# Patient Record
Sex: FEMALE | Race: WHITE | NOT HISPANIC OR LATINO | Employment: OTHER | URBAN - METROPOLITAN AREA
[De-identification: names, ages, dates, MRNs, and addresses within clinical notes are randomized per-mention and may not be internally consistent; named-entity substitution may affect disease eponyms.]

---

## 2017-02-09 ENCOUNTER — HOSPITAL ENCOUNTER (INPATIENT)
Facility: MEDICAL CENTER | Age: 72
LOS: 3 days | DRG: 091 | End: 2017-02-13
Attending: EMERGENCY MEDICINE | Admitting: HOSPITALIST
Payer: MEDICARE

## 2017-02-09 DIAGNOSIS — N17.9 AKI (ACUTE KIDNEY INJURY) (HCC): ICD-10-CM

## 2017-02-09 DIAGNOSIS — F22 DELUSIONS OF PARASITOSIS (HCC): ICD-10-CM

## 2017-02-09 DIAGNOSIS — G93.40 ACUTE ENCEPHALOPATHY: ICD-10-CM

## 2017-02-09 DIAGNOSIS — F11.29 OPIOID DEPENDENCE WITH OPIOID-INDUCED DISORDER (HCC): ICD-10-CM

## 2017-02-09 DIAGNOSIS — E43 SEVERE PROTEIN-CALORIE MALNUTRITION (HCC): ICD-10-CM

## 2017-02-09 DIAGNOSIS — T40.601A OVERDOSE OPIATE, ACCIDENTAL OR UNINTENTIONAL, INITIAL ENCOUNTER (HCC): ICD-10-CM

## 2017-02-09 PROCEDURE — 93005 ELECTROCARDIOGRAM TRACING: CPT

## 2017-02-09 PROCEDURE — G0480 DRUG TEST DEF 1-7 CLASSES: HCPCS

## 2017-02-09 PROCEDURE — 304562 HCHG STAT O2 MASK/CANNULA

## 2017-02-09 PROCEDURE — 99291 CRITICAL CARE FIRST HOUR: CPT

## 2017-02-09 NOTE — IP AVS SNAPSHOT
2/13/2017          Dian Mendoza  [address] [facility] [address]  [facility] [address]    Dear Dian:    [facility] wants to ensure your discharge home is safe and you or your loved ones have had all your questions answered regarding your care after you leave the hospital.    You may receive a telephone call within two days of your discharge.  This call is to make certain you understand your discharge instructions as well as ensure we provided you with the best care possible during your stay with us.     The call will only last approximately 3-5 minutes and will be done by a nurse.    Once again, we want to ensure your discharge home is safe and that you have a clear understanding of any next steps in your care.  If you have any questions or concerns, please do not hesitate to contact us, we are here for you.  Thank you for choosing [facility] for your healthcare needs.    Sincerely,    Mark Amaya    [facility]

## 2017-02-09 NOTE — IP AVS SNAPSHOT
" Home Care Instructions                                                                                                                  Name:Dian Mendoza  Medical Record Number:[id number]  CSN: [id number]    YOB: 1945   Age: 71 y.o.  Sex: female  HT:1.753 m (5' 9.02\") WT: 62.8 kg (138 lb 7.2 oz)          Admit Date: 2/9/2017     Discharge Date:   Today's Date: 2/13/2017  Attending Doctor:  PATSY Meza*                  Allergies:  Atorvastatin; Ezetimibe; Gabapentin; Metoprolol; Other environmental; Spironolactone; Statins; and Tape            Discharge Instructions       Discharge Instructions    Discharged to home by taxi with self. Discharged via wheelchair, hospital escort: Yes.  Special equipment needed: Not Applicable    Be sure to schedule a follow-up appointment with your primary care doctor or any specialists as instructed.     Discharge Plan:   Diet Plan: Discussed (Regular diet as tolerated)  Activity Level: Discussed (Activity as tolerated)  Confirmed Follow up Appointment: Patient to Call and Schedule Appointment  Confirmed Symptoms Management: Discussed  Medication Reconciliation Updated: Yes  Influenza Vaccine Indication: Not indicated: Previously immunized this influenza season and > 8 years of age    I understand that a diet low in cholesterol, fat, and sodium is recommended for good health. Unless I have been given specific instructions below for another diet, I accept this instruction as my diet prescription.   Other diet: Regular diet as tolerated    Special Instructions: None    · Is patient discharged on Warfarin / Coumadin?   Yes    You are receiving the drug warfarin. Please understand the importance of monitoring warfarin with scheduled PT/INR blood draws.  Follow-up with a call to your personal Doctor's office in 3 days to schedule a PT/INR. .    IMPORTANT: HOW TO USE THIS INFORMATION:  This is a summary and does NOT have all possible information about this product. " "This information does not assure that this product is safe, effective, or appropriate for you. This information is not individual medical advice and does not substitute for the advice of your health care professional. Always ask your health care professional for complete information about this product and your specific health needs.      WARFARIN - ORAL (WARF-uh-rin)      COMMON BRAND NAME(S): Coumadin      WARNING:  Warfarin can cause very serious (possibly fatal) bleeding. This is more likely to occur when you first start taking this medication or if you take too much warfarin. To decrease your risk for bleeding, your doctor or other health care provider will monitor you closely and check your lab results (INR test) to make sure you are not taking too much warfarin. Keep all medical and laboratory appointments. Tell your doctor right away if you notice any signs of serious bleeding. See also Side Effects section.      USES:  This medication is used to treat blood clots (such as in deep vein thrombosis-DVT or pulmonary embolus-PE) and/or to prevent new clots from forming in your body. Preventing harmful blood clots helps to reduce the risk of a stroke or heart attack. Conditions that increase your risk of developing blood clots include a certain type of irregular heart rhythm (atrial fibrillation), heart valve replacement, recent heart attack, and certain surgeries (such as hip/knee replacement). Warfarin is commonly called a \"blood thinner,\" but the more correct term is \"anticoagulant.\" It helps to keep blood flowing smoothly in your body by decreasing the amount of certain substances (clotting proteins) in your blood.      HOW TO USE:  Read the Medication Guide provided by your pharmacist before you start taking warfarin and each time you get a refill. If you have any questions, ask your doctor or pharmacist. Take this medication by mouth with or without food as directed by your doctor or other health care " professional, usually once a day. It is very important to take it exactly as directed. Do not increase the dose, take it more frequently, or stop using it unless directed by your doctor. Dosage is based on your medical condition, laboratory tests (such as INR), and response to treatment. Your doctor or other health care provider will monitor you closely while you are taking this medication to determine the right dose for you. Use this medication regularly to get the most benefit from it. To help you remember, take it at the same time each day. It is important to eat a balanced, consistent diet while taking warfarin. Some foods can affect how warfarin works in your body and may affect your treatment and dose. Avoid sudden large increases or decreases in your intake of foods high in vitamin K (such as broccoli, cauliflower, cabbage, brussels sprouts, kale, spinach, and other green leafy vegetables, liver, green tea, certain vitamin supplements). If you are trying to lose weight, check with your doctor before you try to go on a diet. Cranberry products may also affect how your warfarin works. Limit the amount of cranberry juice (16 ounces/480 milliliters a day) or other cranberry products you may drink or eat.      SIDE EFFECTS:  Nausea, loss of appetite, or stomach/abdominal pain may occur. If any of these effects persist or worsen, tell your doctor or pharmacist promptly. Remember that your doctor has prescribed this medication because he or she has judged that the benefit to you is greater than the risk of side effects. Many people using this medication do not have serious side effects. This medication can cause serious bleeding if it affects your blood clotting proteins too much (shown by unusually high INR lab results). Even if your doctor stops your medication, this risk of bleeding can continue for up to a week. Tell your doctor right away if you have any signs of serious bleeding, including: unusual  pain/swelling/discomfort, unusual/easy bruising, prolonged bleeding from cuts or gums, persistent/frequent nosebleeds, unusually heavy/prolonged menstrual flow, pink/dark urine, coughing up blood, vomit that is bloody or looks like coffee grounds, severe headache, dizziness/fainting, unusual or persistent tiredness/weakness, bloody/black/tarry stools, chest pain, shortness of breath, difficulty swallowing. Tell your doctor right away if any of these unlikely but serious side effects occur: persistent nausea/vomiting, severe stomach/abdominal pain, yellowing eyes/skin. This drug rarely has caused very serious (possibly fatal) problems if its effects lead to small blood clots (usually at the beginning of treatment). This can lead to severe skin/tissue damage that may require surgery or amputation if left untreated. Patients with certain blood conditions (protein C or S deficiency) may be at greater risk. Get medical help right away if any of these rare but serious side effects occur: painful/red/purplish patches on the skin (such as on the toe, breast, abdomen), change in the amount of urine, vision changes, confusion, slurred speech, weakness on one side of the body. A very serious allergic reaction to this drug is rare. However, get medical help right away if you notice any symptoms of a serious allergic reaction, including: rash, itching/swelling (especially of the face/tongue/throat), severe dizziness, trouble breathing. This is not a complete list of possible side effects. If you notice other effects not listed above, contact your doctor or pharmacist. In the [place] - Call your doctor for medical advice about side effects. You may report side effects to FDA at 2-930-YWW-1593. In [place] - Call your doctor for medical advice about side effects. You may report side effects to [facility] [place] at 1-368.860.8187.      PRECAUTIONS:  Before taking warfarin, tell your doctor or pharmacist if you are allergic to it; or if you  have any other allergies. This product may contain inactive ingredients, which can cause allergic reactions or other problems. Talk to your pharmacist for more details. Before using this medication, tell your doctor or pharmacist your medical history, especially of: blood disorders (such as anemia, hemophilia), bleeding problems (such as bleeding of the stomach/intestines, bleeding in the brain), blood vessel disorders (such as aneurysms), recent major injury/surgery, liver disease, alcohol use, mental/mood disorders (including memory problems), frequent falls/injuries. It is important that all your doctors and dentists know that you take warfarin. Before having surgery or any medical/dental procedures, tell your doctor or dentist that you are taking this medication and about all the products you use (including prescription drugs, nonprescription drugs, and herbal products). Avoid getting injections into the muscles. If you must have an injection into a muscle (for example, a flu shot), it should be given in the arm. This way, it will be easier to check for bleeding and/or apply pressure bandages. This medication may cause stomach bleeding. Daily use of alcohol while using this medicine will increase your risk for stomach bleeding and may also affect how this medication works. Limit or avoid alcoholic beverages. If you have not been eating well, if you have an illness or infection that causes fever, vomiting, or diarrhea for more than 2 days, or if you start using any antibiotic medications, contact your doctor or pharmacist immediately because these conditions can affect how warfarin works. This medication can cause heavy bleeding. To lower the chance of getting cut, bruised, or injured, use great caution with sharp objects like safety razors and nail cutters. Use an electric razor when shaving and a soft toothbrush when brushing your teeth. Avoid activities such as contact sports. If you fall or injure yourself,  "especially if you hit your head, call your doctor immediately. Your doctor may need to check you. The Food & Drug Administration has stated that generic warfarin products are interchangeable. However, consult your doctor or pharmacist before switching warfarin products. Be careful not to take more than one medication that contains warfarin unless specifically directed by the doctor or health care provider who is monitoring your warfarin treatment. Older adults may be at greater risk for bleeding while using this drug. This medication is not recommended for use during pregnancy because of serious (possibly fatal) harm to an unborn baby. Discuss the use of reliable forms of birth control with your doctor. If you become pregnant or think you may be pregnant, tell your doctor immediately. If you are planning pregnancy, discuss a plan for managing your condition with your doctor before you become pregnant. Your doctor may switch the type of medication you use during pregnancy. Very small amounts of this medication may pass into breast milk but is unlikely to harm a nursing infant. Consult your doctor before breast-feeding.      DRUG INTERACTIONS:  Drug interactions may change how your medications work or increase your risk for serious side effects. This document does not contain all possible drug interactions. Keep a list of all the products you use (including prescription/nonprescription drugs and herbal products) and share it with your doctor and pharmacist. Do not start, stop, or change the dosage of any medicines without your doctor's approval. Warfarin interacts with many prescription, nonprescription, vitamin, and herbal products. This includes medications that are applied to the skin or inside the vagina or rectum. The interactions with warfarin usually result in an increase or decrease in the \"blood-thinning\" (anticoagulant) effect. Your doctor or other health care professional should closely monitor you to " prevent serious bleeding or clotting problems. While taking warfarin, it is very important to tell your doctor or pharmacist of any changes in medications, vitamins, or herbal products that you are taking. Some products that may interact with this drug include: capecitabine, imatinib, mifepristone. Aspirin, aspirin-like drugs (salicylates), and nonsteroidal anti-inflammatory drugs (NSAIDs such as ibuprofen, naproxen, celecoxib) may have effects similar to warfarin. These drugs may increase the risk of bleeding problems if taken during treatment with warfarin. Carefully check all prescription/nonprescription product labels (including drugs applied to the skin such as pain-relieving creams) since the products may contain NSAIDs or salicylates. Talk to your doctor about using a different medication (such as acetaminophen) to treat pain/fever. Low-dose aspirin and related drugs (such as clopidogrel, ticlopidine) should be continued if prescribed by your doctor for specific medical reasons such as heart attack or stroke prevention. Consult your doctor or pharmacist for more details. Many herbal products interact with warfarin. Tell your doctor before taking any herbal products, especially bromelains, coenzyme Q10, cranberry, danshen, dong quai, fenugreek, garlic, ginkgo biloba, ginseng, and Braxton's wort, among others. This medication may interfere with a certain laboratory test to measure theophylline levels, possibly causing false test results. Make sure laboratory personnel and all your doctors know you use this drug.      OVERDOSE:  If overdose is suspected, contact a poison control center or emergency room immediately. [place] residents can call the [place] National Poison Hotline at 1-467.107.3331. [place] residents can call a provincial poison control center. Symptoms of overdose may include: bloody/black/tarry stools, pink/dark urine, unusual/prolonged bleeding.      NOTES:  Do not share this medication with others.  Laboratory and/or medical tests (such as INR, complete blood count) must be performed periodically to monitor your progress or check for side effects. Consult your doctor for more details.      MISSED DOSE:  For the best possible benefit, do not miss any doses. If you do miss a dose and remember on the same day, take it as soon as you remember. If you remember on the next day, skip the missed dose and resume your usual dosing schedule. Do not double the dose to catch up because this could increase your risk for bleeding. Keep a record of missed doses to give to your doctor or pharmacist. Contact your doctor or pharmacist if you miss 2 or more doses in a row.      STORAGE:  Store at room temperature away from light and moisture. Do not store in the bathroom. Keep all medications away from children and pets. Do not flush medications down the toilet or pour them into a drain unless instructed to do so. Properly discard this product when it is  or no longer needed. Consult your pharmacist or local waste disposal company for more details about how to safely discard your product.      MEDICAL ALERT:  Your condition and medication can cause complications in a medical emergency. For information about enrolling in MedicAlert, call 1-706.886.3086 ([place]) or 1-300.672.3889 ([place]).      Information last revised 2010 Copyright(c) 2010 [employer]             · Is patient Post Blood Transfusion?  No        Depression / Suicide Risk    As you are discharged from this [facility], it is important to learn how to keep safe from harming yourself.    Recognize the warning signs:  · Abrupt changes in personality, positive or negative- including increase in energy   · Giving away possessions  · Change in eating patterns- significant weight changes-  positive or negative  · Change in sleeping patterns- unable to sleep or sleeping all the time   · Unwillingness or inability to  communicate  · Depression  · Unusual sadness, discouragement and loneliness  · Talk of wanting to die  · Neglect of personal appearance   · Rebelliousness- reckless behavior  · Withdrawal from people/activities they love  · Confusion- inability to concentrate     If you or a loved one observes any of these behaviors or has concerns about self-harm, here's what you can do:  · Talk about it- your feelings and reasons for harming yourself  · Remove any means that you might use to hurt yourself (examples: pills, rope, extension cords, firearm)  · Get professional help from the community (Mental Health, Substance Abuse, psychological counseling)  · Do not be alone:Call your Safe Contact- someone whom you trust who will be there for you.  · Call your local CRISIS HOTLINE 594-9606 or 746-102-7126  · Call your local Children's Mobile Crisis Response Team [place] (375) 900-3090 or www.[place]  · Call the toll free National Suicide Prevention Hotlines   · National Suicide Prevention Lifeline 562-412-WDWT (6084)  National Hope Line Network 800-SUICIDE (010-4955)          Depression, Adult  Depression refers to feeling sad, low, down in the dumps, blue, gloomy, or empty. In general, there are two kinds of depression:  1. Normal sadness or normal grief. This kind of depression is one that we all feel from time to time after upsetting life experiences, such as the loss of a job or the ending of a relationship. This kind of depression is considered normal, is short lived, and resolves within a few days to 2 weeks. Depression experienced after the loss of a loved one (bereavement) often lasts longer than 2 weeks but normally gets better with time.  2. Clinical depression. This kind of depression lasts longer than normal sadness or normal grief or interferes with your ability to function at home, at work, and in school. It also interferes with your personal relationships. It affects almost every aspect of your life.  Clinical depression is an illness.  Symptoms of depression can also be caused by conditions other than those mentioned above, such as:  · Physical illness. Some physical illnesses, including underactive thyroid gland (hypothyroidism), severe anemia, specific types of cancer, diabetes, uncontrolled seizures, heart and lung problems, strokes, and chronic pain are commonly associated with symptoms of depression.  · Side effects of some prescription medicine. In some people, certain types of medicine can cause symptoms of depression.  · Substance abuse. Abuse of alcohol and illicit drugs can cause symptoms of depression.  SYMPTOMS  Symptoms of normal sadness and normal grief include the following:  · Feeling sad or crying for short periods of time.  · Not caring about anything (apathy).  · Difficulty sleeping or sleeping too much.  · No longer able to enjoy the things you used to enjoy.  · Desire to be by oneself all the time (social isolation).  · Lack of energy or motivation.  · Difficulty concentrating or remembering.  · Change in appetite or weight.  · Restlessness or agitation.  Symptoms of clinical depression include the same symptoms of normal sadness or normal grief and also the following symptoms:  · Feeling sad or crying all the time.  · Feelings of guilt or worthlessness.  · Feelings of hopelessness or helplessness.  · Thoughts of suicide or the desire to harm yourself (suicidal ideation).  · Loss of touch with reality (psychotic symptoms). Seeing or hearing things that are not real (hallucinations) or having false beliefs about your life or the people around you (delusions and paranoia).  DIAGNOSIS   The diagnosis of clinical depression is usually based on how bad the symptoms are and how long they have lasted. Your health care provider will also ask you questions about your medical history and substance use to find out if physical illness, use of prescription medicine, or substance abuse is causing your  depression. Your health care provider may also order blood tests.  TREATMENT   Often, normal sadness and normal grief do not require treatment. However, sometimes antidepressant medicine is given for bereavement to ease the depressive symptoms until they resolve.  The treatment for clinical depression depends on how bad the symptoms are but often includes antidepressant medicine, counseling with a mental health professional, or both. Your health care provider will help to determine what treatment is best for you.  Depression caused by physical illness usually goes away with appropriate medical treatment of the illness. If prescription medicine is causing depression, talk with your health care provider about stopping the medicine, decreasing the dose, or changing to another medicine.  Depression caused by the abuse of alcohol or illicit drugs goes away when you stop using these substances. Some adults need professional help in order to stop drinking or using drugs.  SEEK IMMEDIATE MEDICAL CARE IF:  · You have thoughts about hurting yourself or others.  · You lose touch with reality (have psychotic symptoms).  · You are taking medicine for depression and have a serious side effect.  FOR MORE INFORMATION  · National [place] on Mental Illness: www.nestor.org   · National [place] of Mental Health: www.nimh.nih.gov      This information is not intended to replace advice given to you by your health care provider. Make sure you discuss any questions you have with your health care provider.     Document Released: 12/15/2001 Document Revised: 01/08/2016 Document Reviewed: 03/18/2013  [employer] Interactive Patient Education ©2016 [employer]          Your appointments     Mar 15, 2017  3:20 PM   Established Patient with Julissa Reeves M.D.   [facility])    [address] [place] [address] [address] [facility]  [place] [address]   [phone]           You will be receiving a confirmation call a few days before your appointment  from our automated call confirmation system.              Follow-up Information     1. Follow up with Julissa Reeves M.D.. Schedule an appointment as soon as possible for a visit in 1 week.    Specialty:  Family Medicine    Contact information    [address] [facility] [address]  [address]  [place] [address] [phone]           Discharge Medication Instructions:    Below are the medications your physician expects you to take upon discharge:    Review all your home medications and newly ordered medications with your doctor and/or pharmacist. Follow medication instructions as directed by your doctor and/or pharmacist.    Please keep your medication list with you and share with your physician.               Medication List      CHANGE how you take these medications        Instructions    mupirocin 2 % Oint   What changed:    - how much to take  - how to take this  - when to take this  - additional instructions   Last time this was given:  1 Application on 2/13/2017  3:00 PM   Commonly known as:  BACTROBAN    Apply 1 Inch to affected area(s) every day.   Dose:  1 Inch         CONTINUE taking these medications        Instructions    albuterol 108 (90 BASE) MCG/ACT Aers inhalation aerosol   Commonly known as:  PROAIR HFA    Inhale 2 Puffs by mouth every 6 hours as needed for Shortness of Breath.   Dose:  2 Puff       buPROPion  MG Tb12   Last time this was given:  200 mg on 2/13/2017  9:19 AM   Commonly known as:  WELLBUTRIN-SR    Take 2 Tabs by mouth 2 Times a Day.   Dose:  200 mg       carvedilol 12.5 MG Tabs   Last time this was given:  12.5 mg on 2/13/2017  9:19 AM   Commonly known as:  COREG    Take 1 Tab by mouth 2 times a day, with meals.   Dose:  12.5 mg       docusate sodium 100 MG Caps   Last time this was given:  100 mg on 2/13/2017  9:19 AM   Commonly known as:  COLACE    Take 200 mg by mouth 2 times a day.   Dose:  200 mg       levothyroxine 125 MCG Tabs   Last time this was given:  125 mcg on 2/13/2017  5:45 AM    Commonly known as:  SYNTHROID    Doctor's comments:  Note dose change, please void any remaining 137 mcg dose   Take 1 Tab by mouth Every morning on an empty stomach.   Dose:  125 mcg       lisinopril 5 MG Tabs   Last time this was given:  5 mg on 2/13/2017  9:19 AM   Commonly known as:  PRINIVIL    Take 5 mg by mouth every day.   Dose:  5 mg       MS CONTIN 15 MG Tbcr tablet   Generic drug:  morphine ER    Take 15 mg by mouth every 12 hours.   Dose:  15 mg       ondansetron 4 MG Tbdp   Commonly known as:  ZOFRAN ODT    Take 4 mg by mouth every 8 hours as needed for Nausea/Vomiting.   Dose:  4 mg       oxycodone-acetaminophen  MG Tabs   Commonly known as:  PERCOCET    Take 1 Tab by mouth every 6 hours as needed.   Dose:  1 Tab       sennosides 8.6 MG Tabs   Commonly known as:  SENOKOT    Take 8.6 mg by mouth every day. Indications: Constipation, **OTC**   Dose:  8.6 mg       tolterodine ER 4 MG Cp24   Commonly known as:  DETROL LA    Take 1 Cap by mouth every day.   Dose:  4 mg       trazodone 100 MG Tabs   Last time this was given:  200 mg on 2/12/2017  9:09 PM   Commonly known as:  DESYREL    Take 2 Tabs by mouth every bedtime.   Dose:  200 mg       warfarin 3 MG Tabs   Commonly known as:  COUMADIN    Take 2 Tabs by mouth every bedtime.   Dose:  6 mg               Instructions           Diet / Nutrition:    Follow any diet instructions given to you by your doctor or the dietician, including how much salt (sodium) you are allowed each day.    If you are overweight, talk to your doctor about a weight reduction plan.    Activity:    Remain physically active following your doctor's instructions about exercise and activity.    Rest often.     Any time you become even a little tired or short of breath, SIT DOWN and rest.    Worsening Symptoms:    Report any of the following signs and symptoms to the doctor's office immediately:    *Pain of jaw, arm, or neck  *Chest pain not relieved by medication                                *Dizziness or loss of consciousness  *Difficulty breathing even when at rest   *More tired than usual                                       *Bleeding drainage or swelling of surgical site  *Swelling of feet, ankles, legs or stomach                 *Fever (>100ºF)  *Pink or blood tinged sputum  *Weight gain (3lbs/day or 5lbs /week)           *Shock from internal defibrillator (if applicable)  *Palpitations or irregular heartbeats                *Cool and/or numb extremities    Stroke Awareness    Common Risk Factors for Stroke include:    Age  Atrial Fibrillation  Carotid Artery Stenosis  Diabetes Mellitus  Excessive alcohol consumption  High blood pressure  Overweight   Physical inactivity  Smoking    Warning signs and symptoms of a stroke include:    *Sudden numbness or weakness of the face, arm or leg (especially on one side of the body).  *Sudden confusion, trouble speaking or understanding.  *Sudden trouble seeing in one or both eyes.  *Sudden trouble walking, dizziness, loss of balance or coordination.Sudden severe headache with no known cause.    It is very important to get treatment quickly when a stroke occurs. If you experience any of the above warning signs, call 911 immediately.                   Disclaimer         Quit Smoking / Tobacco Use:    I understand the use of any tobacco products increases my chance of suffering from future heart disease or stroke and could cause other illnesses which may shorten my life. Quitting the use of tobacco products is the single most important thing I can do to improve my health. For further information on smoking / tobacco cessation call a Toll Free Quit Line at 1-669.165.1718 (*National [employer] [place]) or 1-714.233.6762 (American [employer] Association) or you can access the web based program at www.lungusa.org.    [place] Tobacco Users Help Line:  (172) 819-8475       Toll Free: 1-969.953.3296  Quit Tobacco Program [facility]  (429) 176-3196    Crisis Hotline:    [place] Crisis Hotline:  1-795-BCZNNPB or 1-307.439.1533    [place] Crisis Hotline:    1-907.238.5098 or 929-120-7756    Discharge Survey:   Thank you for choosing [facility]. We hope we did everything we could to make your hospital stay a pleasant one. You may be receiving a phone survey and we would appreciate your time and participation in answering the questions. Your input is very valuable to us in our efforts to improve our service to our patients and their families.        My signature on this form indicates that:    1. I have reviewed and understand the above information.  2. My questions regarding this information have been answered to my satisfaction.  3. I have formulated a plan with my discharge nurse to obtain my prescribed medications for home.                  Disclaimer         __________________________________                     __________       ________                       Patient Signature                                                 Date                    Time

## 2017-02-09 NOTE — IP AVS SNAPSHOT
" <p align=\"LEFT\"><IMG SRC=\"//[email or web address]$/[email or web address]\" alt=\"Image\" WIDTH=\"50%\" HEIGHT=\"200\" BORDER=\"\"></p>                   Name:Dian Mendoza  Medical Record Number:[id number]  CSN: [id number]    YOB: 1945   Age: 71 y.o.  Sex: female  HT:1.753 m (5' 9.02\") WT: 62.8 kg (138 lb 7.2 oz)          Admit Date: 2/9/2017     Discharge Date:   Today's Date: 2/13/2017  Attending Doctor:  PATSY Meza*                  Allergies:  Atorvastatin; Ezetimibe; Gabapentin; Metoprolol; Other environmental; Spironolactone; Statins; and Tape          Your appointments     Mar 15, 2017  3:20 PM   Established Patient with Julissa Reeves M.D.   [address]  [facility] [id number] [phone]           You will be receiving a confirmation call a few days before your appointment from our automated call confirmation system.              Follow-up Information     1. Follow up with Julissa Reeves M.D.. Schedule an appointment as soon as possible for a visit in 1 week.    Specialty:  Family Medicine    Contact information    [address] [place] [address] [address]  [address]  [facility] [id number] [phone]           Medication List      Take these Medications        Instructions    albuterol 108 (90 BASE) MCG/ACT Aers inhalation aerosol   Commonly known as:  PROAIR HFA    Inhale 2 Puffs by mouth every 6 hours as needed for Shortness of Breath.   Dose:  2 Puff       buPROPion  MG Tb12   Commonly known as:  WELLBUTRIN-SR    Take 2 Tabs by mouth 2 Times a Day.   Dose:  200 mg       carvedilol 12.5 MG Tabs   Commonly known as:  COREG    Take 1 Tab by mouth 2 times a day, with meals.   Dose:  12.5 mg       docusate sodium 100 MG Caps   Commonly known as:  COLACE    Take 200 mg by mouth 2 times a day.   Dose:  200 mg       levothyroxine 125 MCG Tabs   Commonly known as:  SYNTHROID    Doctor's comments:  Note dose change, please void any remaining 137 mcg dose   Take 1 Tab by mouth Every " morning on an empty stomach.   Dose:  125 mcg       lisinopril 5 MG Tabs   Commonly known as:  PRINIVIL    Take 5 mg by mouth every day.   Dose:  5 mg       MS CONTIN 15 MG Tbcr tablet   Generic drug:  morphine ER    Take 15 mg by mouth every 12 hours.   Dose:  15 mg       mupirocin 2 % Oint   What changed:    - how much to take  - how to take this  - when to take this  - additional instructions   Commonly known as:  BACTROBAN    Apply 1 Inch to affected area(s) every day.   Dose:  1 Inch       ondansetron 4 MG Tbdp   Commonly known as:  ZOFRAN ODT    Take 4 mg by mouth every 8 hours as needed for Nausea/Vomiting.   Dose:  4 mg       oxycodone-acetaminophen  MG Tabs   Commonly known as:  PERCOCET    Take 1 Tab by mouth every 6 hours as needed.   Dose:  1 Tab       sennosides 8.6 MG Tabs   Commonly known as:  SENOKOT    Take 8.6 mg by mouth every day. Indications: Constipation, **OTC**   Dose:  8.6 mg       tolterodine ER 4 MG Cp24   Commonly known as:  DETROL LA    Take 1 Cap by mouth every day.   Dose:  4 mg       trazodone 100 MG Tabs   Commonly known as:  DESYREL    Take 2 Tabs by mouth every bedtime.   Dose:  200 mg       warfarin 3 MG Tabs   Commonly known as:  COUMADIN    Take 2 Tabs by mouth every bedtime.   Dose:  6 mg

## 2017-02-09 NOTE — IP AVS SNAPSHOT
Scandid Access Code: Activation code not generated  Current Scandid Status: Patient Declined    [email or web address]  A secure, online tool to manage your health information     [employer]’s Scandid® is a secure, online tool that connects you to your personalized health information from the privacy of your home -- day or night - making it very easy for you to manage your healthcare. Once the activation process is completed, you can even access your medical information using the Scandid ruby, which is available for free in the Apple Ruby store or Google Play store.     Scandid provides the following levels of access (as shown below):   My Chart Features   [facility] Primary Care Doctor [facility]  Specialists [facility]  Urgent  Care Non-[facility]  Primary Care  Doctor   Email your healthcare team securely and privately 24/7 X X X X   Manage appointments: schedule your next appointment; view details of past/upcoming appointments X      Request prescription refills. X      View recent personal medical records, including lab and immunizations X X X X   View health record, including health history, allergies, medications X X X X   Read reports about your outpatient visits, procedures, consult and ER notes X X X X   See your discharge summary, which is a recap of your hospital and/or ER visit that includes your diagnosis, lab results, and care plan. X X       How to register for Scandid:  1. Go to  [email or web address].  2. Click on the Sign Up Now box, which takes you to the New Member Sign Up page. You will need to provide the following information:  a. Enter your Scandid Access Code exactly as it appears at the top of this page. (You will not need to use this code after you’ve completed the sign-up process. If you do not sign up before the expiration date, you must request a new code.)   b. Enter your date of birth.   c. Enter your home email address.   d. Click Submit, and follow the next screen’s instructions.  3. Create a Scandid ID.  This will be your [employer] login ID and cannot be changed, so think of one that is secure and easy to remember.  4. Create a [employer]Thought password. You can change your password at any time.  5. Enter your Password Reset Question and Answer. This can be used at a later time if you forget your password.   6. Enter your e-mail address. This allows you to receive e-mail notifications when new information is available in SourceThought.  7. Click Sign Up. You can now view your health information.    For assistance activating your SourceThought account, call [phone]

## 2017-02-10 ENCOUNTER — HOSPITAL ENCOUNTER (OUTPATIENT)
Dept: RADIOLOGY | Facility: MEDICAL CENTER | Age: 72
End: 2017-02-10
Attending: EMERGENCY MEDICINE
Payer: MEDICARE

## 2017-02-10 ENCOUNTER — RESOLUTE PROFESSIONAL BILLING HOSPITAL PROF FEE (OUTPATIENT)
Dept: HOSPITALIST | Facility: MEDICAL CENTER | Age: 72
End: 2017-02-10
Payer: MEDICARE

## 2017-02-10 PROBLEM — E87.29 STARVATION KETOACIDOSIS: Status: ACTIVE | Noted: 2017-02-10

## 2017-02-10 PROBLEM — T40.601A NARCOTIC OVERDOSE (HCC): Status: ACTIVE | Noted: 2017-02-10

## 2017-02-10 PROBLEM — R41.82 ALTERED MENTAL STATUS: Status: ACTIVE | Noted: 2017-02-10

## 2017-02-10 PROBLEM — N17.9 ACUTE RENAL FAILURE (ARF) (HCC): Status: ACTIVE | Noted: 2017-02-10

## 2017-02-10 PROBLEM — E87.20 METABOLIC ACIDOSIS: Status: ACTIVE | Noted: 2017-02-10

## 2017-02-10 PROBLEM — N17.9 AKI (ACUTE KIDNEY INJURY) (HCC): Status: ACTIVE | Noted: 2017-02-10

## 2017-02-10 PROBLEM — T73.0XXA STARVATION KETOACIDOSIS: Status: ACTIVE | Noted: 2017-02-10

## 2017-02-10 LAB
ALBUMIN SERPL BCP-MCNC: 4.1 G/DL (ref 3.2–4.9)
ALBUMIN/GLOB SERPL: 1.2 G/DL
ALP SERPL-CCNC: 104 U/L (ref 30–99)
ALT SERPL-CCNC: 91 U/L (ref 2–50)
AMMONIA PLAS-SCNC: 32 UMOL/L (ref 11–45)
AMPHET UR QL SCN: NEGATIVE
ANION GAP SERPL CALC-SCNC: 10 MMOL/L (ref 0–11.9)
ANION GAP SERPL CALC-SCNC: 14 MMOL/L (ref 0–11.9)
APAP SERPL-MCNC: <10 UG/ML (ref 10–30)
APPEARANCE UR: CLEAR
AST SERPL-CCNC: 72 U/L (ref 12–45)
B-OH-BUTYR SERPL-MCNC: 2.87 MMOL/L (ref 0.02–0.27)
BARBITURATES UR QL SCN: NEGATIVE
BASE EXCESS BLDA CALC-SCNC: -13 MMOL/L (ref -4–3)
BASOPHILS # BLD AUTO: 0.9 % (ref 0–1.8)
BASOPHILS # BLD: 0.08 K/UL (ref 0–0.12)
BENZODIAZ UR QL SCN: NEGATIVE
BILIRUB SERPL-MCNC: 0.2 MG/DL (ref 0.1–1.5)
BILIRUB UR QL STRIP.AUTO: NEGATIVE
BODY TEMPERATURE: ABNORMAL CENTIGRADE
BUN SERPL-MCNC: 34 MG/DL (ref 8–22)
BUN SERPL-MCNC: 46 MG/DL (ref 8–22)
BZE UR QL SCN: NEGATIVE
CALCIUM SERPL-MCNC: 8.3 MG/DL (ref 8.5–10.5)
CALCIUM SERPL-MCNC: 9.3 MG/DL (ref 8.5–10.5)
CANNABINOIDS UR QL SCN: NEGATIVE
CHLORIDE SERPL-SCNC: 104 MMOL/L (ref 96–112)
CHLORIDE SERPL-SCNC: 118 MMOL/L (ref 96–112)
CK SERPL-CCNC: 125 U/L (ref 0–154)
CK SERPL-CCNC: 276 U/L (ref 0–154)
CO2 SERPL-SCNC: 17 MMOL/L (ref 20–33)
CO2 SERPL-SCNC: 18 MMOL/L (ref 20–33)
COHGB MFR BLD: 3.2 % (ref 0–4.9)
COLOR UR: YELLOW
CREAT SERPL-MCNC: 1.85 MG/DL (ref 0.5–1.4)
CREAT SERPL-MCNC: 4.38 MG/DL (ref 0.5–1.4)
EOSINOPHIL # BLD AUTO: 0.5 K/UL (ref 0–0.51)
EOSINOPHIL NFR BLD: 5.3 % (ref 0–6.9)
ERYTHROCYTE [DISTWIDTH] IN BLOOD BY AUTOMATED COUNT: 50.8 FL (ref 35.9–50)
ETHANOL BLD-MCNC: 0 G/DL
FLUAV+FLUBV AG SPEC QL IA: NORMAL
GFR SERPL CREATININE-BSD FRML MDRD: 10 ML/MIN/1.73 M 2
GFR SERPL CREATININE-BSD FRML MDRD: 27 ML/MIN/1.73 M 2
GLOBULIN SER CALC-MCNC: 3.3 G/DL (ref 1.9–3.5)
GLUCOSE SERPL-MCNC: 73 MG/DL (ref 65–99)
GLUCOSE SERPL-MCNC: 81 MG/DL (ref 65–99)
GLUCOSE UR STRIP.AUTO-MCNC: NEGATIVE MG/DL
HCO3 BLDA-SCNC: 16 MMOL/L (ref 17–25)
HCT VFR BLD AUTO: 34.8 % (ref 37–47)
HGB BLD-MCNC: 10.1 G/DL (ref 12–16)
INR PPP: 3.44 (ref 0.87–1.13)
INR PPP: 4.81 (ref 0.87–1.13)
KETONES UR STRIP.AUTO-MCNC: 10 MG/DL
LACTATE BLD-SCNC: 1.3 MMOL/L (ref 0.5–2)
LEUKOCYTE ESTERASE UR QL STRIP.AUTO: NEGATIVE
LG PLATELETS BLD QL SMEAR: NORMAL
LYMPHOCYTES # BLD AUTO: 1.23 K/UL (ref 1–4.8)
LYMPHOCYTES NFR BLD: 13.1 % (ref 22–41)
MANUAL DIFF BLD: NORMAL
MCH RBC QN AUTO: 26.9 PG (ref 27–33)
MCHC RBC AUTO-ENTMCNC: 29 G/DL (ref 33.6–35)
MCV RBC AUTO: 92.6 FL (ref 81.4–97.8)
MDMA UR QL SCN: ABNORMAL
METHADONE UR QL SCN: NEGATIVE
MICRO URNS: ABNORMAL
MONOCYTES # BLD AUTO: 0.08 K/UL (ref 0–0.85)
MONOCYTES NFR BLD AUTO: 0.9 % (ref 0–13.4)
MORPHOLOGY BLD-IMP: NORMAL
NEUTROPHILS # BLD AUTO: 7.5 K/UL (ref 2–7.15)
NEUTROPHILS NFR BLD: 79.8 % (ref 44–72)
NITRITE UR QL STRIP.AUTO: NEGATIVE
NRBC # BLD AUTO: 0 K/UL
NRBC BLD AUTO-RTO: 0 /100 WBC
OPIATES UR QL SCN: POSITIVE
OSMOLALITY SERPL: 305 MOSM/KG H2O (ref 278–298)
OVALOCYTES BLD QL SMEAR: NORMAL
OXYCODONE UR QL SCN: POSITIVE
PCO2 BLDA: 50.3 MMHG (ref 26–37)
PCP UR QL SCN: NEGATIVE
PH BLDA: 7.11 [PH] (ref 7.4–7.5)
PH UR STRIP.AUTO: 5 [PH]
PLATELET # BLD AUTO: 376 K/UL (ref 164–446)
PLATELET BLD QL SMEAR: NORMAL
PMV BLD AUTO: 10.5 FL (ref 9–12.9)
PO2 BLDA: 164.2 MMHG (ref 64–87)
POIKILOCYTOSIS BLD QL SMEAR: NORMAL
POTASSIUM SERPL-SCNC: 4.9 MMOL/L (ref 3.6–5.5)
POTASSIUM SERPL-SCNC: 5 MMOL/L (ref 3.6–5.5)
PROPOXYPH UR QL SCN: NEGATIVE
PROT SERPL-MCNC: 7.4 G/DL (ref 6–8.2)
PROT UR QL STRIP: NEGATIVE MG/DL
PROTHROMBIN TIME: 35.7 SEC (ref 12–14.6)
PROTHROMBIN TIME: 46.5 SEC (ref 12–14.6)
RBC # BLD AUTO: 3.76 M/UL (ref 4.2–5.4)
RBC BLD AUTO: PRESENT
RBC UR QL AUTO: NEGATIVE
SALICYLATES SERPL-MCNC: 0 MG/DL (ref 15–25)
SAO2 % BLDA: 98.9 % (ref 93–99)
SIGNIFICANT IND 70042: NORMAL
SITE SITE: NORMAL
SODIUM SERPL-SCNC: 136 MMOL/L (ref 135–145)
SODIUM SERPL-SCNC: 145 MMOL/L (ref 135–145)
SOURCE SOURCE: NORMAL
SP GR UR STRIP.AUTO: 1.01
TROPONIN I SERPL-MCNC: 0.04 NG/ML (ref 0–0.04)
TROPONIN I SERPL-MCNC: 0.05 NG/ML (ref 0–0.04)
TROPONIN I SERPL-MCNC: 0.06 NG/ML (ref 0–0.04)
WBC # BLD AUTO: 9.4 K/UL (ref 4.8–10.8)

## 2017-02-10 PROCEDURE — 85007 BL SMEAR W/DIFF WBC COUNT: CPT

## 2017-02-10 PROCEDURE — 770006 HCHG ROOM/CARE - MED/SURG/GYN SEMI*

## 2017-02-10 PROCEDURE — 99291 CRITICAL CARE FIRST HOUR: CPT | Performed by: INTERNAL MEDICINE

## 2017-02-10 PROCEDURE — 82140 ASSAY OF AMMONIA: CPT

## 2017-02-10 PROCEDURE — 82550 ASSAY OF CK (CPK): CPT | Mod: 91

## 2017-02-10 PROCEDURE — 96365 THER/PROPH/DIAG IV INF INIT: CPT

## 2017-02-10 PROCEDURE — 87086 URINE CULTURE/COLONY COUNT: CPT

## 2017-02-10 PROCEDURE — 51798 US URINE CAPACITY MEASURE: CPT

## 2017-02-10 PROCEDURE — 82010 KETONE BODYS QUAN: CPT

## 2017-02-10 PROCEDURE — A9270 NON-COVERED ITEM OR SERVICE: HCPCS | Performed by: HOSPITALIST

## 2017-02-10 PROCEDURE — 80053 COMPREHEN METABOLIC PANEL: CPT

## 2017-02-10 PROCEDURE — 700105 HCHG RX REV CODE 258: Performed by: INTERNAL MEDICINE

## 2017-02-10 PROCEDURE — 82803 BLOOD GASES ANY COMBINATION: CPT

## 2017-02-10 PROCEDURE — 700102 HCHG RX REV CODE 250 W/ 637 OVERRIDE(OP): Performed by: EMERGENCY MEDICINE

## 2017-02-10 PROCEDURE — A9270 NON-COVERED ITEM OR SERVICE: HCPCS | Performed by: EMERGENCY MEDICINE

## 2017-02-10 PROCEDURE — 71010 DX-CHEST-PORTABLE (1 VIEW): CPT

## 2017-02-10 PROCEDURE — 96376 TX/PRO/DX INJ SAME DRUG ADON: CPT

## 2017-02-10 PROCEDURE — 700105 HCHG RX REV CODE 258: Performed by: HOSPITALIST

## 2017-02-10 PROCEDURE — 83930 ASSAY OF BLOOD OSMOLALITY: CPT

## 2017-02-10 PROCEDURE — 96375 TX/PRO/DX INJ NEW DRUG ADDON: CPT

## 2017-02-10 PROCEDURE — 80307 DRUG TEST PRSMV CHEM ANLYZR: CPT

## 2017-02-10 PROCEDURE — 700105 HCHG RX REV CODE 258: Performed by: EMERGENCY MEDICINE

## 2017-02-10 PROCEDURE — 700112 HCHG RX REV CODE 229: Performed by: HOSPITALIST

## 2017-02-10 PROCEDURE — 700101 HCHG RX REV CODE 250: Performed by: HOSPITALIST

## 2017-02-10 PROCEDURE — 87400 INFLUENZA A/B EACH AG IA: CPT

## 2017-02-10 PROCEDURE — 83605 ASSAY OF LACTIC ACID: CPT

## 2017-02-10 PROCEDURE — 85027 COMPLETE CBC AUTOMATED: CPT

## 2017-02-10 PROCEDURE — 36415 COLL VENOUS BLD VENIPUNCTURE: CPT

## 2017-02-10 PROCEDURE — 85610 PROTHROMBIN TIME: CPT

## 2017-02-10 PROCEDURE — 80048 BASIC METABOLIC PNL TOTAL CA: CPT

## 2017-02-10 PROCEDURE — 87040 BLOOD CULTURE FOR BACTERIA: CPT

## 2017-02-10 PROCEDURE — 700111 HCHG RX REV CODE 636 W/ 250 OVERRIDE (IP): Performed by: EMERGENCY MEDICINE

## 2017-02-10 PROCEDURE — 700111 HCHG RX REV CODE 636 W/ 250 OVERRIDE (IP)

## 2017-02-10 PROCEDURE — 700102 HCHG RX REV CODE 250 W/ 637 OVERRIDE(OP): Performed by: HOSPITALIST

## 2017-02-10 PROCEDURE — 84484 ASSAY OF TROPONIN QUANT: CPT | Mod: 91

## 2017-02-10 PROCEDURE — 82375 ASSAY CARBOXYHB QUANT: CPT

## 2017-02-10 PROCEDURE — 96361 HYDRATE IV INFUSION ADD-ON: CPT

## 2017-02-10 PROCEDURE — 81003 URINALYSIS AUTO W/O SCOPE: CPT

## 2017-02-10 PROCEDURE — 99223 1ST HOSP IP/OBS HIGH 75: CPT | Mod: AI | Performed by: HOSPITALIST

## 2017-02-10 RX ORDER — ONDANSETRON 4 MG/1
4 TABLET, ORALLY DISINTEGRATING ORAL EVERY 8 HOURS PRN
COMMUNITY
End: 2017-04-26

## 2017-02-10 RX ORDER — SODIUM CHLORIDE 9 MG/ML
INJECTION, SOLUTION INTRAVENOUS CONTINUOUS
Status: CANCELLED | OUTPATIENT
Start: 2017-02-10

## 2017-02-10 RX ORDER — AMOXICILLIN 250 MG
1 CAPSULE ORAL NIGHTLY
Status: DISCONTINUED | OUTPATIENT
Start: 2017-02-10 | End: 2017-02-13 | Stop reason: HOSPADM

## 2017-02-10 RX ORDER — CEFTRIAXONE 1 G/1
1 INJECTION, POWDER, FOR SOLUTION INTRAMUSCULAR; INTRAVENOUS ONCE
Status: COMPLETED | OUTPATIENT
Start: 2017-02-10 | End: 2017-02-10

## 2017-02-10 RX ORDER — OXYBUTYNIN CHLORIDE 10 MG/1
10 TABLET, EXTENDED RELEASE ORAL DAILY
Status: DISCONTINUED | OUTPATIENT
Start: 2017-02-11 | End: 2017-02-13 | Stop reason: HOSPADM

## 2017-02-10 RX ORDER — ONDANSETRON 2 MG/ML
INJECTION INTRAMUSCULAR; INTRAVENOUS
Status: COMPLETED
Start: 2017-02-10 | End: 2017-02-10

## 2017-02-10 RX ORDER — DOCUSATE SODIUM 100 MG/1
100 CAPSULE, LIQUID FILLED ORAL 2 TIMES DAILY
Status: DISCONTINUED | OUTPATIENT
Start: 2017-02-10 | End: 2017-02-13 | Stop reason: HOSPADM

## 2017-02-10 RX ORDER — LEVOTHYROXINE SODIUM 0.05 MG/1
125 TABLET ORAL
Status: DISCONTINUED | OUTPATIENT
Start: 2017-02-11 | End: 2017-02-13 | Stop reason: HOSPADM

## 2017-02-10 RX ORDER — NALOXONE HYDROCHLORIDE 0.4 MG/ML
0.4 INJECTION, SOLUTION INTRAMUSCULAR; INTRAVENOUS; SUBCUTANEOUS PRN
Status: DISCONTINUED | OUTPATIENT
Start: 2017-02-10 | End: 2017-02-13 | Stop reason: HOSPADM

## 2017-02-10 RX ORDER — NALOXONE HYDROCHLORIDE 0.4 MG/ML
0.4 INJECTION, SOLUTION INTRAMUSCULAR; INTRAVENOUS; SUBCUTANEOUS ONCE
Status: COMPLETED | OUTPATIENT
Start: 2017-02-10 | End: 2017-02-10

## 2017-02-10 RX ORDER — ONDANSETRON 2 MG/ML
4 INJECTION INTRAMUSCULAR; INTRAVENOUS ONCE
Status: COMPLETED | OUTPATIENT
Start: 2017-02-10 | End: 2017-02-10

## 2017-02-10 RX ORDER — TRAZODONE HYDROCHLORIDE 50 MG/1
200 TABLET ORAL
Status: DISCONTINUED | OUTPATIENT
Start: 2017-02-10 | End: 2017-02-13 | Stop reason: HOSPADM

## 2017-02-10 RX ORDER — HEPARIN SODIUM 5000 [USP'U]/ML
5000 INJECTION, SOLUTION INTRAVENOUS; SUBCUTANEOUS EVERY 8 HOURS
Status: DISCONTINUED | OUTPATIENT
Start: 2017-02-10 | End: 2017-02-10

## 2017-02-10 RX ORDER — MORPHINE SULFATE 15 MG/1
15 TABLET, FILM COATED, EXTENDED RELEASE ORAL EVERY 12 HOURS
Status: ON HOLD | COMMUNITY
End: 2018-10-09

## 2017-02-10 RX ORDER — ONDANSETRON 4 MG/1
4 TABLET, ORALLY DISINTEGRATING ORAL EVERY 4 HOURS PRN
Status: DISCONTINUED | OUTPATIENT
Start: 2017-02-10 | End: 2017-02-13 | Stop reason: HOSPADM

## 2017-02-10 RX ORDER — BUPROPION HYDROCHLORIDE 100 MG/1
200 TABLET, EXTENDED RELEASE ORAL 2 TIMES DAILY
Status: DISCONTINUED | OUTPATIENT
Start: 2017-02-10 | End: 2017-02-13 | Stop reason: HOSPADM

## 2017-02-10 RX ORDER — SODIUM CHLORIDE 9 MG/ML
1000 INJECTION, SOLUTION INTRAVENOUS ONCE
Status: COMPLETED | OUTPATIENT
Start: 2017-02-10 | End: 2017-02-10

## 2017-02-10 RX ORDER — ENEMA 19; 7 G/133ML; G/133ML
1 ENEMA RECTAL
Status: DISCONTINUED | OUTPATIENT
Start: 2017-02-10 | End: 2017-02-13 | Stop reason: HOSPADM

## 2017-02-10 RX ORDER — AMOXICILLIN 250 MG
1 CAPSULE ORAL
Status: DISCONTINUED | OUTPATIENT
Start: 2017-02-10 | End: 2017-02-13 | Stop reason: HOSPADM

## 2017-02-10 RX ORDER — NALOXONE HYDROCHLORIDE 0.4 MG/ML
INJECTION, SOLUTION INTRAMUSCULAR; INTRAVENOUS; SUBCUTANEOUS
Status: COMPLETED
Start: 2017-02-10 | End: 2017-02-10

## 2017-02-10 RX ORDER — ASPIRIN 325 MG
325 TABLET ORAL ONCE
Status: COMPLETED | OUTPATIENT
Start: 2017-02-10 | End: 2017-02-10

## 2017-02-10 RX ORDER — ONDANSETRON 2 MG/ML
4 INJECTION INTRAMUSCULAR; INTRAVENOUS EVERY 4 HOURS PRN
Status: DISCONTINUED | OUTPATIENT
Start: 2017-02-10 | End: 2017-02-13 | Stop reason: HOSPADM

## 2017-02-10 RX ORDER — SODIUM CHLORIDE 9 MG/ML
INJECTION, SOLUTION INTRAVENOUS CONTINUOUS
Status: DISCONTINUED | OUTPATIENT
Start: 2017-02-10 | End: 2017-02-13 | Stop reason: HOSPADM

## 2017-02-10 RX ORDER — BISACODYL 10 MG
10 SUPPOSITORY, RECTAL RECTAL
Status: DISCONTINUED | OUTPATIENT
Start: 2017-02-10 | End: 2017-02-13 | Stop reason: HOSPADM

## 2017-02-10 RX ORDER — LACTULOSE 20 G/30ML
30 SOLUTION ORAL
Status: DISCONTINUED | OUTPATIENT
Start: 2017-02-10 | End: 2017-02-13 | Stop reason: HOSPADM

## 2017-02-10 RX ORDER — WARFARIN SODIUM 3 MG/1
3 TABLET ORAL
Status: DISCONTINUED | OUTPATIENT
Start: 2017-02-10 | End: 2017-02-10

## 2017-02-10 RX ORDER — CARVEDILOL 12.5 MG/1
12.5 TABLET ORAL 2 TIMES DAILY WITH MEALS
Status: DISCONTINUED | OUTPATIENT
Start: 2017-02-10 | End: 2017-02-13 | Stop reason: HOSPADM

## 2017-02-10 RX ORDER — ACETAMINOPHEN 325 MG/1
650 TABLET ORAL EVERY 6 HOURS PRN
Status: DISCONTINUED | OUTPATIENT
Start: 2017-02-10 | End: 2017-02-13 | Stop reason: HOSPADM

## 2017-02-10 RX ORDER — LISINOPRIL 5 MG/1
5 TABLET ORAL DAILY
COMMUNITY
End: 2017-06-29

## 2017-02-10 RX ORDER — ALBUTEROL SULFATE 90 UG/1
2 AEROSOL, METERED RESPIRATORY (INHALATION) EVERY 6 HOURS PRN
Status: DISCONTINUED | OUTPATIENT
Start: 2017-02-10 | End: 2017-02-13 | Stop reason: HOSPADM

## 2017-02-10 RX ORDER — LISINOPRIL 5 MG/1
5 TABLET ORAL DAILY
Status: DISCONTINUED | OUTPATIENT
Start: 2017-02-11 | End: 2017-02-13 | Stop reason: HOSPADM

## 2017-02-10 RX ADMIN — ASPIRIN 325 MG: 325 TABLET, COATED ORAL at 02:19

## 2017-02-10 RX ADMIN — DOCUSATE SODIUM 100 MG: 100 CAPSULE ORAL at 09:44

## 2017-02-10 RX ADMIN — DOCUSATE SODIUM 100 MG: 100 CAPSULE ORAL at 20:40

## 2017-02-10 RX ADMIN — MUPIROCIN 1 APPLICATION: 20 OINTMENT TOPICAL at 20:46

## 2017-02-10 RX ADMIN — CARVEDILOL 12.5 MG: 12.5 TABLET, FILM COATED ORAL at 17:01

## 2017-02-10 RX ADMIN — ONDANSETRON 4 MG: 2 INJECTION, SOLUTION INTRAMUSCULAR; INTRAVENOUS at 03:00

## 2017-02-10 RX ADMIN — SODIUM CHLORIDE 1000 ML: 9 INJECTION, SOLUTION INTRAVENOUS at 01:00

## 2017-02-10 RX ADMIN — NALOXONE HYDROCHLORIDE 0.4 MG: 0.4 INJECTION, SOLUTION INTRAMUSCULAR; INTRAVENOUS; SUBCUTANEOUS at 03:56

## 2017-02-10 RX ADMIN — ONDANSETRON 4 MG: 2 INJECTION INTRAMUSCULAR; INTRAVENOUS at 03:00

## 2017-02-10 RX ADMIN — NALOXONE HYDROCHLORIDE 0.4 MG: 0.4 INJECTION, SOLUTION INTRAMUSCULAR; INTRAVENOUS; SUBCUTANEOUS at 03:00

## 2017-02-10 RX ADMIN — SODIUM CHLORIDE 1000 ML: 9 INJECTION, SOLUTION INTRAVENOUS at 03:33

## 2017-02-10 RX ADMIN — CEFTRIAXONE 1 G: 1 INJECTION, POWDER, FOR SOLUTION INTRAMUSCULAR; INTRAVENOUS at 01:46

## 2017-02-10 RX ADMIN — BUPROPION HYDROCHLORIDE 200 MG: 100 TABLET, FILM COATED, EXTENDED RELEASE ORAL at 20:40

## 2017-02-10 RX ADMIN — STANDARDIZED SENNA CONCENTRATE AND DOCUSATE SODIUM 1 TABLET: 8.6; 5 TABLET, FILM COATED ORAL at 20:40

## 2017-02-10 RX ADMIN — TRAZODONE HYDROCHLORIDE 200 MG: 50 TABLET ORAL at 20:40

## 2017-02-10 RX ADMIN — MUPIROCIN 1 APPLICATION: 20 OINTMENT TOPICAL at 09:44

## 2017-02-10 RX ADMIN — SODIUM CHLORIDE: 9 INJECTION, SOLUTION INTRAVENOUS at 09:45

## 2017-02-10 RX ADMIN — MUPIROCIN 1 APPLICATION: 20 OINTMENT TOPICAL at 15:00

## 2017-02-10 ASSESSMENT — PAIN SCALES - GENERAL
PAINLEVEL_OUTOF10: 0

## 2017-02-10 ASSESSMENT — LIFESTYLE VARIABLES
EVER_SMOKED: YES
ALCOHOL_USE: NO

## 2017-02-10 ASSESSMENT — COPD QUESTIONNAIRES
COPD SCREENING SCORE: 5
DURING THE PAST 4 WEEKS HOW MUCH DID YOU FEEL SHORT OF BREATH: NONE/LITTLE OF THE TIME
DO YOU EVER COUGH UP ANY MUCUS OR PHLEGM?: NO/ONLY WITH OCCASIONAL COLDS OR INFECTIONS
HAVE YOU SMOKED AT LEAST 100 CIGARETTES IN YOUR ENTIRE LIFE: YES

## 2017-02-10 NOTE — PROGRESS NOTES
Pt not answering questions. Per pt's son, Kehinde, he is not aware of pt's medications. Kehinde states to call Alexander as he is the primary caretaker for pt. LM to Alexander. Will update med rec as soon as phone call is returned.

## 2017-02-10 NOTE — ED NOTES
Pt becoming somnolent again, oxygen desaturating, hospitalist aware. Subsequent narcan dose administered.

## 2017-02-10 NOTE — PROGRESS NOTES
Inpatient Anticoagulation Service Note    Date: 2/10/2017  Reason for Anticoagulation: Deep Vein Thrombosis  Hemoglobin Value: 10.1  Hematocrit Value: 34.8  Lab Platelet Value: 376  Target INR: 2.0 to 3.0  INR from last 7 days     Date/Time INR Value    02/10/17 1312 (!)4.81    02/10/17 0025 (!)3.44        Dose from last 7 days     Date/Time Dose (mg)    02/10/17 1312 0        Average Dose (mg):  (Home regimen = 6 mg daily)  Significant Interactions: Thyroid Medications         Comments: Patient continued on warfarin from home in patient for history of DVT. INR on admission elevated, reassessment this afternoon demonstrates continued upward trend. Historical dosing during prior admissions and from [facility] noted above. .     Plan:  Based on current INR, will hold dose and re-evaluate for dosing needs as appropriate    Education Material Provided?: No (Chronic warfarin therapy)    Pharmacist suggested discharge dosing: TBD, potentially resume prior dosing of 6 mg daily    Dimitri ToureD, BCPS

## 2017-02-10 NOTE — ED NOTES
Pt given dose of Narcan per Pulmonary, Pt immediately sat up and vomited. Pt is more arousable at this time, speaking in clear sentences. Pt given dose of zofran.

## 2017-02-10 NOTE — PROGRESS NOTES
Pt alert to self, easily redirected. Patient knows she is at [facility], unsure of date. Bed alarm on, call light within reach. Pt denies pain or sob. Pt on IVF for elevated creatinine- BMP to be redrawn. Lab made aware. Pt with multiple scabbed areas, denies itchiness. Per ICU CHIQUI neri pt does not need to be on contact precautions. Pt was showered and inspected prior to tx and found no bugs on patient. trops elevated- MD aware, vital signs stable. SCDs on.     --INR elevated, MD aware. Warfarin on hold per pharmacy  --spoke with son- per son pt has had these scabs for a very long time and it is not from bed bugs. Son states that regardless of what anyone tells her patient continues to say they are from bugs. Per son she is a+o at baseline but forgetful.  Pt with bandaids to lower extremities - RN inspected under bandaids and saw multiple scabbed areas but pt requesting that bandaids be left on.   All Munoz phone # [phone]  --pt remains drowsy but very easily aroused. Placed on continuous pulse ox for closer monitoring

## 2017-02-10 NOTE — H&P
"HOSPITAL MEDICINE HISTORY/ PHYSICAL    Date & Time note created:    2/10/2017   3:54 AM       Patient ID:   Name: Dian Mendoza  . YOB: 1945. Age: 71 y.o. female. MRN: [id number]    Admitting Attending:  Azael Quiroz     PCP : Julissa Reeves M.D.        Chief Complaint:       Altered mental status    History of Present Illness:    Kelly is a 71 y.o. female w/h/o protein C&S deficiency, COPD, hypertension, systolic CHF who presents with altered mental status. Patient was incoherent and walked out of the bathroom at home naked and was thus brought to the hospital by ambulance. Patient was somewhat hypoxic on arrival with saturation of 84%. Patient recently filled a large quantity of narcotics, her oxycodone 10 and MS Contin at the pharmacy. She responded to Narcan and improved in the ER. She however isn't able to recount what happened at home. She denies suicidal ideation.    Review of Systems:    Has urinary urgency, cough, lightheadedness  Please see HPI, all other systems were reviewed and are negative (AMA/CMS criteria)              Past Medical/ Family / Social history (PFSH):   Past Medical History   Diagnosis Date   • Lumbar disc herniation with radiculopathy    • TMJ (temporomandibular joint syndrome)    • Protein C deficiency (CMS-HCC)    • Protein S deficiency (CMS-HCC)    • Rheumatoid arthritis(714.0)    • Osteoarthritis      Spine and knees and jaw   • Hypothyroidism    • Anemia    • Hypopotassemia      Of uncertain cause maintained with potassium replacement.   • Enlargement of lymph nodes      Of uncertain etiology   • Personal history of arthritis      Osteoarthritis including TMJ syndrome.   • Anesthesia      very emotional when waking up   • Urinary bladder disorder      Difficulty with Patricio insertion \"obstruction\"   • Seizure (CMS-HCC)      As a child   • Cancer of left breast (CMS-HCC) 1997     Left breast   • Atrial tachycardia, paroxysmal (CMS-HCC)      occasional arrythmia, " has been evaluated by Dr. 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 coli sepsis (CMS-[facility])      Right buttock pressure wound   • MSSA (methicillin susceptible Staphylococcus aureus)      Vaginal and buttock   • Cholelithiasis 2014     With gallbladder wall thickening, now status post cholecystectomy   • Hypertension     • Sleep apnea      Witnessed in hospital needs work up   • LBBB (left bundle branch block)    • Cardiomyopathy (CMS-[facility]) 1/6/2015     Idiopathic with normal coronary arteries on cath 1/6/2015    • Systolic and diastolic CHF, chronic (CMS-[facility])    • Pulmonary hypertension (CMS-[facility])      Past Surgical History   Procedure Laterality Date   • Ulcer debridement  6/5/08     Performed by ARIELLE MITCHELL at SURGERY [facility]   • Flap closure  6/5/08     Performed by ARIELLE MITCHELL at [facility]   • Tonsillectomy     • Knee arthroscopy       left   • Other orthopedic surgery       BILAT FOOT   • Bursa excision       bursa sac removed bilat hips   • Mastectomy       left   • Knee replacement, total  2009     left   • Lumbar fusion posterior  1998   • Abdominal hysterectomy total  1970's   • Elbow orif  1950     right   • Recovery  12/8/2010     Performed by TERRELL MCKENNA at [facility]   • Gastroscopy with biopsy  12/28/2011     Performed by RAD CANTU at [facility]   • Egd w/endoscopic ultrasound  12/28/2011     Performed by RAD CANTU at [facility]   • Ercp  12/28/2011     Performed by RAD CANTU at [facility]   • Colonoscopy  2001, 6/2010     normal   • Patricia by laparoscopy  8/27/2014      "Performed by Ajith Kearney M.D. at SURGERY [facility]     Current Outpatient Medications:  No current facility-administered medications on file prior to encounter.     Current Outpatient Prescriptions on File Prior to Encounter   Medication Sig Dispense Refill   • mupirocin (BACTROBAN) 2 % Ointment Apply to affected area 3 times a day 22 g 2   • oxycodone-acetaminophen (PERCOCET)  MG Tab Take 1 Tab by mouth every 6 hours as needed. 30 Tab 0   • lisinopril (PRINIVIL) 20 MG Tab Take 1 Tab by mouth every day. 30 Tab 2   • trazodone (DESYREL) 100 MG Tab Take 2 Tabs by mouth every bedtime. 60 Tab 6   • ondansetron (ZOFRAN) 4 MG Tab tablet TAKE ONE TABLET BY MOUTH EVERY EIGHT HOURS AS NEEDED NAUSEA AND VOMITING  60 Tab 4   • warfarin (COUMADIN) 3 MG Tab Take 2 Tabs by mouth every bedtime. 60 Tab 6   • albuterol (PROAIR HFA) 108 (90 BASE) MCG/ACT Aero Soln inhalation aerosol Inhale 2 Puffs by mouth every 6 hours as needed for Shortness of Breath. 8.5 g 3   • carvedilol (COREG) 12.5 MG Tab Take 1 Tab by mouth 2 times a day, with meals. 60 Tab 6   • buPROPion SR (WELLBUTRIN-SR) 100 MG TABLET SR 12 HR Take 2 Tabs by mouth 2 Times a Day. 120 Tab 6   • levothyroxine (SYNTHROID) 125 MCG Tab Take 1 Tab by mouth Every morning on an empty stomach. 30 Tab 6   • tolterodine ER (DETROL LA) 4 MG CAPSULE SR 24 HR Take 1 Cap by mouth every day. 30 Cap 6   • docusate sodium (COLACE) 100 MG CAPS Take 200 mg by mouth 2 times a day.     • sennosides (SENOKOT) 8.6 MG TABS Take 8.6 mg by mouth every day. Indications: Constipation, **OTC**       Medication Allergy/Sensitivities:  Allergies   Allergen Reactions   • Atorvastatin      \"I fell and hit my head but it could've been the other medications that I was taken.\"   • Ezetimibe      \"I fell and hit my head but it could've been the other medications that I was taken.\"   • Gabapentin      \"I fell and hit my head but it could've been the other medications that I was taken.\"   • " "Metoprolol      \"I fell and hit my head but it could've been the other medications that I was taken.\"     • Other Environmental    • Spironolactone      Abrupt renal failure   • Statins [Hmg-Coa-R Inhibitors]      \"I fell and hit my head but it could've been the other medications that I was taken.\"   • Tape Hives and Rash     Plastic tape     Family History:  Family History   Problem Relation Age of Onset   • Non-contributory Brother      copd, cirrhosis, alzheimer's mild   • Heart Disease Brother      3 MIs   • Heart Disease Sister    • Heart Disease Mother    • Heart Disease Father    • Cancer Father      esophageal   • GI Sister      colon polyps, precancerous   • Cancer Sister      breast X 2      Social History:  Social History   Substance Use Topics   • Smoking status: Former Smoker -- 1.50 packs/day for 40 years     Types: Cigarettes     Quit date: 01/01/1972   • Smokeless tobacco: Former User   • Alcohol Use: No      Comment: none since 2007     #################################################################  Physical Exam:   Vitals/ General Appearance:   Weight/BMI: Body mass index is 23.62 kg/(m^2).  Pulse 100, temperature 36.9 °C (98.5 °F), resp. rate 18, height 1.753 m (5' 9\"), weight 72.576 kg (160 lb), SpO2 100 %.   Filed Vitals:    02/10/17 0100 02/10/17 0130 02/10/17 0201 02/10/17 0230   Pulse: 97 96 107 100   Temp:       Resp: 35 21 23 18   Height:       Weight:       SpO2: 100% 100% 98% 100%    Oxygen Therapy:  Pulse Oximetry: 100 %, O2 (LPM): 3, O2 Delivery: Nasal Cannula    Constitutional:  thin, frail  HENMT: Normocephalic, atraumatic, b/l ears normal, nose normal  Eyes:  EOMI, conjunctiva normal, no discharge  Neck: no tracheal deviation, supple  Cardiovascular: tachycardic, normal rhythm, no murmurs, no rubs or gallops; no cyanosis, clubbing or edema  Lungs: Respiratory effort is normal, normal breath sounds, breath sounds clear to auscultation b/l, no rales, rhonchi or wheezing  Abdomen: " soft, non-tender, no guarding or rebound  Skin: warm, dry, no erythema, no rash  Neurologic: Alert and somewhat oriented  Psychiatric: Some anxiety or depression. No suicidal ideation    #################################################################  Lab Data Review:    Objective  Recent Results (from the past 24 hour(s))   EKG (ER)    Collection Time: 17 11:41 PM   Result Value Ref Range    Report       [facility]    Test Date:  2017  Pt Name:    MATTY PUENTE              Department: ER  MRN:        [id number]                      Room:        11  Gender:     F                            Technician: [id number]  :        1945                   Requested By:MINERVA FINLEY  Order #:    [id number]                    Reading MD:    Measurements  Intervals                                Axis  Rate:       108                          P:          -72  NC:         140                          QRS:        3  QRSD:       138                          T:          118  QT:         376  QTc:        504    Interpretive Statements  ECTOPIC ATRIAL TACHYCARDIA  LEFT BUNDLE BRANCH BLOCK  BASELINE WANDER IN LEAD(S) V3  Compared to ECG 2016 09:33:02  Sinus tachycardia no longer present     Lactic acid (lactate)    Collection Time: 02/10/17 12:25 AM   Result Value Ref Range    Lactic Acid 1.3 0.5 - 2.0 mmol/L   CBC WITH DIFFERENTIAL    Collection Time: 02/10/17 12:25 AM   Result Value Ref Range    WBC 9.4 4.8 - 10.8 K/uL    RBC 3.76 (L) 4.20 - 5.40 M/uL    Hemoglobin 10.1 (L) 12.0 - 16.0 g/dL    Hematocrit 34.8 (L) 37.0 - 47.0 %    MCV 92.6 81.4 - 97.8 fL    MCH 26.9 (L) 27.0 - 33.0 pg    MCHC 29.0 (L) 33.6 - 35.0 g/dL    RDW 50.8 (H) 35.9 - 50.0 fL    Platelet Count 376 164 - 446 K/uL    MPV 10.5 9.0 - 12.9 fL    Nucleated RBC 0.00 /100 WBC    NRBC (Absolute) 0.00 K/uL    Neutrophils-Polys 79.80 (H) 44.00 - 72.00 %    Lymphocytes 13.10 (L) 22.00 - 41.00 %    Monocytes 0.90  0.00 - 13.40 %    Eosinophils 5.30 0.00 - 6.90 %    Basophils 0.90 0.00 - 1.80 %    Neutrophils (Absolute) 7.50 (H) 2.00 - 7.15 K/uL    Lymphs (Absolute) 1.23 1.00 - 4.80 K/uL    Monos (Absolute) 0.08 0.00 - 0.85 K/uL    Eos (Absolute) 0.50 0.00 - 0.51 K/uL    Baso (Absolute) 0.08 0.00 - 0.12 K/uL   COMP METABOLIC PANEL    Collection Time: 02/10/17 12:25 AM   Result Value Ref Range    Sodium 136 135 - 145 mmol/L    Potassium 5.0 3.6 - 5.5 mmol/L    Chloride 104 96 - 112 mmol/L    Co2 18 (L) 20 - 33 mmol/L    Anion Gap 14.0 (H) 0.0 - 11.9    Glucose 81 65 - 99 mg/dL    Bun 46 (H) 8 - 22 mg/dL    Creatinine 4.38 (H) 0.50 - 1.40 mg/dL    Calcium 9.3 8.5 - 10.5 mg/dL    AST(SGOT) 72 (H) 12 - 45 U/L    ALT(SGPT) 91 (H) 2 - 50 U/L    Alkaline Phosphatase 104 (H) 30 - 99 U/L    Total Bilirubin 0.2 0.1 - 1.5 mg/dL    Albumin 4.1 3.2 - 4.9 g/dL    Total Protein 7.4 6.0 - 8.2 g/dL    Globulin 3.3 1.9 - 3.5 g/dL    A-G Ratio 1.2 g/dL   TROPONIN    Collection Time: 02/10/17 12:25 AM   Result Value Ref Range    Troponin I 0.05 (H) 0.00 - 0.04 ng/mL   AMMONIA    Collection Time: 02/10/17 12:25 AM   Result Value Ref Range    Ammonia 32 11 - 45 umol/L   DIAGNOSTIC ALCOHOL    Collection Time: 02/10/17 12:25 AM   Result Value Ref Range    Diagnostic Alcohol 0.00 0.00 g/dL   PROTHROMBIN TIME    Collection Time: 02/10/17 12:25 AM   Result Value Ref Range    PT 35.7 (H) 12.0 - 14.6 sec    INR 3.44 (H) 0.87 - 1.13   CARBOXYHEMOGLOBIN    Collection Time: 02/10/17 12:25 AM   Result Value Ref Range    Carbon Monoxide-Co 3.20 0.00 - 4.90 %   ESTIMATED GFR    Collection Time: 02/10/17 12:25 AM   Result Value Ref Range    GFR If  12 (A) >60 mL/min/1.73 m 2    GFR If Non African American 10 (A) >60 mL/min/1.73 m 2   DIFFERENTIAL MANUAL    Collection Time: 02/10/17 12:25 AM   Result Value Ref Range    Manual Diff Status PERFORMED    PERIPHERAL SMEAR REVIEW    Collection Time: 02/10/17 12:25 AM   Result Value Ref Range     Peripheral Smear Review see below    PLATELET ESTIMATE    Collection Time: 02/10/17 12:25 AM   Result Value Ref Range    Plt Estimation Normal    MORPHOLOGY    Collection Time: 02/10/17 12:25 AM   Result Value Ref Range    RBC Morphology Present     Large Platelets 1+     Poikilocytosis 1+     Ovalocytes 1+    OSMOLALITY SERUM    Collection Time: 02/10/17 12:25 AM   Result Value Ref Range    Osmolality Serum 305 (H) 278 - 298 mOsm/kg H2O   BETA-HYDROXYBUTYRIC ACID    Collection Time: 02/10/17 12:25 AM   Result Value Ref Range    beta-Hydroxybutyric Acid 2.87 (H) 0.02 - 0.27 mmol/L   SALICYLATE    Collection Time: 02/10/17 12:25 AM   Result Value Ref Range    Salicylates, Quant. 0 (L) 15 - 25 mg/dL   ACETAMINOPHEN    Collection Time: 02/10/17 12:25 AM   Result Value Ref Range    Acetomenophen -Tylenol <10 10 - 30 ug/mL   CREATINE KINASE    Collection Time: 02/10/17 12:25 AM   Result Value Ref Range    CPK Total 125 0 - 154 U/L   ARTERIAL BLOOD GAS w/ O2 (LAB)    Collection Time: 02/10/17 12:39 AM   Result Value Ref Range    Ph 7.11 (LL) 7.40 - 7.50    Pco2 50.3 (H) 26.0 - 37.0 mmHg    Po2 164.2 (H) 64.0 - 87.0 mmHg    O2 Saturation 98.9 93.0 - 99.0 %    Hco3 16 (L) 17 - 25 mmol/L    Base Excess -13 (L) -4 - 3 mmol/L    Body Temp see below Centigrade   INFLUENZA RAPID    Collection Time: 02/10/17 12:45 AM   Result Value Ref Range    Significant Indicator NEG     Source RESP     Site RESPIRATORY     Rapid Influenza A-B       Negative for Influenza A and Influenza B antigens.  Infection due to influenza A or B cannot be ruled out  since the antigen present in the specimen may be below the  detection limit of the test. Culture confirmation of  negative samples is recommended.         (click the triangle to expand results)  My interpretation of lab results:   Beta hydroxybutyric acid 2.87, osmolality 305, troponins are 0.05, hemoglobin 10.1, pH 7.11, PCO2 50.3, bicarb 16    Imaging/Procedures Review:    DX-CHEST-PORTABLE  (1 VIEW)   Final Result         1.  No acute cardiopulmonary disease.   2.  Atherosclerosis   3.  Hyperexpansion of lungs favors changes of COPD.        EKG:   per my independant read:  QTc:504, HR: 108, Normal Sinus Rhythm, old left bundle branch block    Assessment and Plan:      1. Altered mental status  - Responded to Narcan  - Likely due to narcotic overdose  2. Starvation ketosis  - Possibly from decreased oral intake  - CPK pending  3. Anion gap metabolic acidosis  - Secondary to ketosis  - Slightly elevated osmolar gap of 11  - Hydrating with IV fluids  - Pulm Castro consulted  4. Acute kidney injury  - Likely due to dehydration  - Re-hydrating with gentle IV fluids  5. History of congestive heart failure  - not in current exacerbation  - Caution with IV fluids  6. Elevated troponin  - Likely demand ischemia and renal failure  - Will trend and monitor on telemetry  7. Chronic pain  - Hold off on pain medications for now while patient is recovering  - We'll then decide with patient further course  8. Prophylaxis: sc heparin  9. Code: Full code per patient  10. Dispo: She will be admitted to inpatient for management that is expected to take greater than 2 midnights    Patient is critically ill.   The patient continues to have: Narcotic overdose/electrolyte abnormalities  The vital organ system that is affected is the: Circulation  If untreated there is a high chance of deterioration into: Respiratory failure  And eventually death.   The critical care that I am providing today is: Exam, medications, admission  The critical that has been undertaken is medically complex.   There has been no overlap in critical care time.   Critical Care Time not including procedures: 54 mins

## 2017-02-10 NOTE — PROGRESS NOTES
Pulmonary Critical Care Progress Note        Chief Complaint: AMS    History of Present Illness: 71 y.o. female who apparently was brought in to the emergency room by EMS today because of altered mental status.  She apparently was incoherent and walked out of her bathroom at home naked and confused.  She was only oriented to herself on arrival.  She was hypoxemic with a saturation of 84% on room air.  She has been placed on supplemental oxygen. When I initially evaluated her, she was somnolent, but easily arousable.  She knew she was in the hospital and knew she was at [facility].  Review of her narcotic prescription history was performed and on 02/08/2017, she received 150 tablets of 10 mg oxycodone and 60 tablets of extended release morphine, 15 mg strength.  We then administered 0.4 mg of Narcan intravenously.  Shortly after this, her mental status improved and she began to have vomiting.  This was treated with Zofran.  She denies any chest pain or chest pressure.  She denies any cough, sputum production, or hemoptysis.     ROS:  Respiratory: negative, Cardiac: negative, GI: negative.  All other systems negative.    Interval Events:  24 hour interval history reviewed    PFSH:  No change.    Respiratory:    Room air   Pulse Oximetry: 100 %          Exam: unlabored respirations, no intercostal retractions or accessory muscle use and clear to auscultation without rales or wheezes  ImagingAvailable data reviewed   Recent Labs      02/10/17   0039   LGZIW15O  7.11*   ERGOXZ054O  50.3*   EZNID426S  164.2*   VHWV2LRH  98.9   ARTHCO3  16*   ARTBE  -13*       HemoDynamics:  Pulse: (!) 109, Heart Rate (Monitored): (!) 111  NIBP: 106/42 mmHg       Exam: regular rate and rhythm, regular rhythm (Sinus)  Imaging: Available data reviewed  Recent Labs      02/10/17   0025   CPKTOTAL  125   TROPONINI  0.05*       Neuro:  GCS Total [place] Coma Score: 14       Exam: no focal deficits noted mental status intact oriented for age  x3  Imaging: Available data reviewed    Fluids:  Intake/Output       17 - 17 0659 (Not Admitted) 17 - 02/10/17 0659 02/10/17 0700 - 17 0659      3706-1924 0481-4762 Total 6510-8593 1407-3265 Total 5426-3700 4644-2980 Total       Intake    P.O.  --  -- --  --  -- --  0  -- 0    P.O. -- -- -- -- -- -- 0 -- 0    Total Intake -- -- -- -- -- -- 0 -- 0       Output    Urine  --  -- --  --  -- --  0  -- 0    Void (ml) -- -- -- -- -- -- 0 -- 0    Stool  --  -- --  --  -- --  --  -- --    Number of Times Stooled -- -- -- -- -- -- 0 x -- 0 x    Total Output -- -- -- -- -- -- 0 -- 0       Net I/O     -- -- -- -- -- -- 0 -- 0        Weight: 62.8 kg (138 lb 7.2 oz)  Recent Labs      02/10/17   0025   SODIUM  136   POTASSIUM  5.0   CHLORIDE  104   CO2  18*   BUN  46*   CREATININE  4.38*   CALCIUM  9.3       GI/Nutrition:  Exam: abdomen is soft and non-tender, normal active bowel sounds  Imaging: Available data reviewed  taking PO  Liver Function  Recent Labs      02/10/17   0025   ALTSGPT  91*   ASTSGOT  72*   ALKPHOSPHAT  104*   TBILIRUBIN  0.2   GLUCOSE  81       Heme:  Recent Labs      02/10/17   0025   RBC  3.76*   HEMOGLOBIN  10.1*   HEMATOCRIT  34.8*   PLATELETCT  376   PROTHROMBTM  35.7*   INR  3.44*       Infectious Disease:  Temp  Av.1 °C (98.7 °F)  Min: 36.9 °C (98.5 °F)  Max: 37.1 °C (98.8 °F)  Micro: reviewed  Recent Labs      02/10/17   0025   WBC  9.4   NEUTSPOLYS  79.80*   LYMPHOCYTES  13.10*   MONOCYTES  0.90   EOSINOPHILS  5.30   BASOPHILS  0.90   ASTSGOT  72*   ALTSGPT  91*   ALKPHOSPHAT  104*   TBILIRUBIN  0.2     Current Facility-Administered Medications   Medication Dose Frequency Provider Last Rate Last Dose   • NS infusion   Continuous J Luis Bruec M.D.       • MD ALERT... warfarin (COUMADIN) per pharmacy protocol   PRN Azael Quiroz M.D.       • trazodone (DESYREL) tablet 200 mg  200 mg QHS Azael Quiroz M.D.       • tolterodine ER (DETROL LA) capsule 4 mg   4 mg DAILY Azael Quiroz M.D.       • mupirocin (BACTROBAN) 2 % ointment   TID Azael Quiroz M.D.       • lisinopril (PRINIVIL) tablet 20 mg  20 mg DAILY Azael Quiroz M.D.       • levothyroxine (SYNTHROID) tablet 125 mcg  125 mcg AM ES Azael Quiroz M.D.       • carvedilol (COREG) tablet 12.5 mg  12.5 mg BID WITH MEALS Azael Quiroz M.D.       • buPROPion SR (WELLBUTRIN-SR) tablet 200 mg  200 mg BID Azael Quiroz M.D.       • albuterol inhaler 2 Puff  2 Puff Q6HRS PRN Azael Quiroz M.D.       • docusate sodium (COLACE) capsule 100 mg  100 mg BID Azael Quiroz M.D.       • senna-docusate (PERICOLACE or SENOKOT S) 8.6-50 MG per tablet 1 Tab  1 Tab Nightly Azael Quiroz M.D.       • senna-docusate (PERICOLACE or SENOKOT S) 8.6-50 MG per tablet 1 Tab  1 Tab Q24HRS PRN Azael Quiroz M.D.       • lactulose 20 GM/30ML solution 30 mL  30 mL Q24HRS PRN Azael Quiroz M.D.       • bisacodyl (DULCOLAX) suppository 10 mg  10 mg Q24HRS PRN Azael Quiroz M.D.       • fleet enema 133 mL  1 Each Once PRN Azael Quiroz M.D.       • acetaminophen (TYLENOL) tablet 650 mg  650 mg Q6HRS PRN Azael Quiroz M.D.       • ondansetron (ZOFRAN) syringe/vial injection 4 mg  4 mg Q4HRS PRN Azael Quiroz M.D.       • ondansetron (ZOFRAN ODT) dispertab 4 mg  4 mg Q4HRS PRN Azael Quiroz M.D.       • Respiratory Care per Protocol   Continuous RT Azael Quiroz M.D.       • naloxone (NARCAN) injection 0.4 mg  0.4 mg PRN Azael Quiroz M.D.         Last reviewed on 2/10/2017 12:35 AM by Oskar D. Park, R.N.    Quality  Measures:  Medications reviewed, Labs reviewed and Radiology images reviewed        DVT Prophylaxis: Warfarin (Coumadin)  DVT prophylaxis - mechanical: SCDs  Ulcer prophylaxis: Yes    Assessed for rehab: Patient returned to prior level of function, rehabilitation not indicated at this time    Assessment/Plan:  1. Acute hypercarbic and hypoxemic respiratory failure.  2.  Acute metabolic encephalopathy, suspect secondary to narcotic  overdose.     It is unknown whether or not this was intentional or unintentional.  This lady   received a prescription for 10 mg oxycodone and 15 mg morphine extended    release tablets on 02/08/2017.  3.  Acute kidney injury.  4.  Elevated aminotransferases.  5.  Elevated troponin.  6.  Anemia.  7.  History of protein C and protein S deficiency with recurrent pulmonary    embolism.  She is therapeutically anticoagulated on warfarin.  Her INR is    actually a little high.  8.  Rheumatoid arthritis.  9.  Osteoarthritis.  10.  History of carcinoma of the left breast, status post left mastectomy.  11.  Hypothyroidism.  12.  Seizure disorder.  13.  Chronic obstructive pulmonary disease.  14.  Chronic systolic heart failure with ejection fraction of 40%.  15.  Paroxysmal atrial tachycardia.  16.  Systemic arterial hypertension.  17.  Pulmonary hypertension with right ventricular systolic pressure of 50    mmHg.    Encourage IS, ambulation  Avoid narcotic withdrawal but caution with sedatives  Cont ivfs  Ok to transfer patient out of ICU today. [facility] will sign off at transfer. Please call with questions.    Discussed patient condition and risk of morbidity and/or mortality with RN, RT, Pharmacy, Charge nurse / hot rounds, QA team and hospitalist.

## 2017-02-10 NOTE — DISCHARGE PLANNING
Admit altered mental status.  Observed at home confused.  BIB EMS.     72 y/o  female. [place] resident.  2 sons listed as emergency contacts.  Admit profile indicates patient lives with family, 2 story apt.  Medicare and medicaid FFS insurance.  PCP for post acute care needs.    Last admit 11/18/16 for SIRS     She was DC's home with [facility] and lovenox which patient has $0 co-pay. She uses [facility] pharmacy off of [address]([phone] fax, [phone] ).      Will follow for post acute needs.

## 2017-02-10 NOTE — ED NOTES
US PIV placed, blood drawn and sent to lab. Lab called for ABG and second culture. ERP at bedside for cardiac US.

## 2017-02-10 NOTE — ED NOTES
"Chief Complaint   Patient presents with   • ALOC     last known well at 1200. A/O x1 on arrival    • Hypoxemia     Pt BIB EMS following a call from her son for altered level of consciousness. Pt reportedly was incoherent and walked out of bathroom at home naked and confused. Pt is A/O x1 on arrival, markedly confused and easily distracted, requires repeated reorientation. Pt unable to answer most questions despite efforts to do so, is emotionally labile and anxious. Pt is slightly tremulous. On scene Pt was hypoxic at 84% on RA, Pt on 3L NC with normal saturation on arrival. Pt is tachycardic and tachypnic. Pt has extensive medical hx. Pt has had ICU hospitalization for similar presentation per EMS. ERP and Resident at bedside on arrival. Sepsis score of 5, charge RN notified.     Pulse 115  Temp(Src) 36.9 °C (98.5 °F)  Resp 20  Ht 1.753 m (5' 9\")  Wt 72.576 kg (160 lb)  BMI 23.62 kg/m2  SpO2 97%    "

## 2017-02-10 NOTE — CONSULTS
DATE OF SERVICE:  02/10/2017    REQUESTING PHYSICIAN:  Dr. Mark Sanchez.    CONSULTING PHYSICIAN:  J Luis De Los Santos MD    TYPE OF CONSULTATION:  Pulmonary medicine and critical care medicine.    REASON FOR CONSULTATION:  Evaluation and management of hypoxemia and altered   mental status.    CHIEF COMPLAINT:  The patient herself is denying any specific complaints.    HISTORY OF PRESENT ILLNESS:  I was kindly asked by Dr. Sanchez to see and   evaluate this lady regarding the above problems.  This is a 71-year-old lady   who apparently was brought in to the emergency room by EMS today because of   altered mental status.  She apparently was incoherent and walked out of her   bathroom at home naked and confused.  She was only oriented to herself on   arrival.  She was hypoxemic with a saturation of 84% on room air.  She has   been placed on supplemental oxygen.  When I initially evaluated her, she was   somnolent, but easily arousable.  She knew she was in the hospital and knew   she was at [facility].  Review of her narcotic prescription history was performed   and on 02/08/2017, she received 150 tablets of 10 mg oxycodone and 60 tablets   of extended release morphine, 15 mg strength.  We then administered 0.4 mg of   Narcan intravenously.  Shortly after this, her mental status improved and she   began to have vomiting.  This was treated with Zofran.  She denies any chest   pain or chest pressure.  She denies any cough, sputum production, or   hemoptysis.    CURRENT MEDICATIONS:  She takes oxycodone and extended release morphine as   described above.  Review of her prior records show that she takes albuterol as   needed, bupropion  mg tablets 2 tablets twice a day, carvedilol 12.5 mg   twice a day, levothyroxine 125 mcg a day, lisinopril 20 mg a day, Detrol-LA 4   mg daily, trazodone 200 mg at bedtime.  She is also on Coumadin.    ALLERGIES:  SHE IS INTOLERANT TO ATORVASTATIN, EZETIMIBE, GABAPENTIN,    METOPROLOL, SPIRONOLACTONE, STATINS, TAPE.    PAST SURGICAL HISTORY:  Cholecystectomy, tonsillectomy, knee surgery, left   mastectomy, left total knee arthroplasty, lumbar spine surgery, total   abdominal hysterectomy.    ILLNESSES:  Significant for protein C and protein S deficiency.  She has had   pulmonary embolism in the past, rheumatoid arthritis, osteoarthritis,   hypothyroidism, seizure disorder, carcinoma of the left breast, paroxysmal   atrial tachycardia, chronic obstructive pulmonary disease, systemic arterial   hypertension, chronic systolic heart failure with an ejection fraction of 40%,   pulmonary hypertension with a right ventricular systolic pressure of 50 mmHg.    SOCIAL HISTORY:  She apparently smokes one and a half packages of cigarettes   daily, but quit sometime ago.    FAMILY HISTORY:  Noncontributory.    REVIEW OF SYSTEMS:  The A and CMS system review does not reveal additional   significant positive findings.    PHYSICAL EXAMINATION:  VITAL SIGNS:  Her temperature is 98.5, her blood pressure is 121/73, her heart   rate is 107, her respiratory rate is currently 23.  GENERAL:  She is an elderly-appearing lady.  HEENT:  Normocephalic, atraumatic.  Sinuses are nontender.  Nares patent.    Oropharynx with moist mucous membranes.  NECK:  Trachea midline, supple.  CHEST:  Symmetrical.  HEART:  Regular rhythm.  LUNGS:  Diminished breath sounds, but clear to auscultation and percussion.  ABDOMEN:  Soft, nondistended, nontender.  EXTREMITIES:  No clubbing or cyanosis.  NEUROLOGIC:  She is markedly improved after intravenous Narcan.  She is moving   all extremities.    LABORATORY DATA:  Her white blood cell count is 9400, hemoglobin 10.1,   hematocrit 34.8, platelet count 376,000.  Sodium 136, potassium 5.0, chloride   104, CO2 of 18, BUN 46, creatinine 4.38, glucose 81, AST 72, ALT 91, alkaline   phosphatase 104.  Troponin I 0.05.  Diagnostic alcohol is 0.  INR is 3.44.    Arterial blood gas shows  a pH of 7.11, pCO2 of 50, pO2 of 164.    IMAGING:  Chest x-ray shows clear hyperexpanded lungs.    IMPRESSION:  1.  Acute hypercarbic and hypoxemic respiratory failure.  2.  Acute metabolic encephalopathy, suspect secondary to narcotic overdose.    It is unknown whether or not this was intentional or unintentional.  This lady   received a prescription for 10 mg oxycodone and 15 mg morphine extended   release tablets on 02/08/2017.  3.  Acute kidney injury.  4.  Elevated aminotransferases.  5.  Elevated troponin.  6.  Anemia.  7.  History of protein C and protein S deficiency with recurrent pulmonary   embolism.  She is therapeutically anticoagulated on warfarin.  Her INR is   actually a little high.  8.  Rheumatoid arthritis.  9.  Osteoarthritis.  10.  History of carcinoma of the left breast, status post left mastectomy.  11.  Hypothyroidism.  12.  Seizure disorder.  13.  Chronic obstructive pulmonary disease.  14.  Chronic systolic heart failure with ejection fraction of 40%.  15.  Paroxysmal atrial tachycardia.  16.  Systemic arterial hypertension.  17.  Pulmonary hypertension with right ventricular systolic pressure of 50   mmHg.    PLAN AND MEDICAL-DECISION MAKING:  This lady is critically ill.  She is going   to be admitted to the intensive care unit for close observation.  She improved   with naloxone.  We may need to place her on a naloxone drip until the effects   of her narcotics have worn off.  It is unclear whether or not this was an   intentional or unintentional narcotic overdose.  She received a significant   amount of morphine SR as well as oxycodone yesterday.  We do not have her pill   bottles available to do a pill count.  She is going to be placed on   respiratory protocol for oxygen and bronchodilator therapy.  I am going to   check a CPK level to see if she has rhabdomyolysis.  We will hydrate her with   intravenous crystalloid solution.  We will follow her electrolytes and renal   function very  closely.  We will continue her warfarin for her hereditary   thrombophilia and history of recurrent pulmonary emboli.  We need to follow   her cardiopulmonary status very closely as we hydrate her with intravenous   crystalloid solution given her history of chronic systolic heart failure with   an ejection fraction of 40%.  At the current time, her prognosis is guarded   and she is critically ill.  I have spent 35 minutes providing direct critical   care services at the bedside.  There has been no time overlap.  The time spent   excludes the time spent performing procedures (82811).    The case has been reviewed with Dr. Finley, nursing and respiratory therapy.    Thank you, Dr. Finley, for allowing us to participate in the care of this   lady.  We will continue to follow her with great interest.       ____________________________________     MD HARIKA DANIEL / VINAY    DD:  02/10/2017 03:15:38  DT:  02/10/2017 03:38:02    D#:  [id number]  Job#:  [id number]    cc: MINERVA FINLEY MD

## 2017-02-10 NOTE — ED PROVIDER NOTES
ED Provider Note    Scribed for Mark Sanchez M.D. by Niko Patel. 2/9/2017, 11:54 PM.    Primary care provider: Julissa Reeves M.D.  Means of arrival: Ambulance  History obtained from: Patient  History limited by: Altered mental status      CHIEF COMPLAINT  Chief Complaint   Patient presents with   • ALOC     last known well at 1200. A/O x1 on arrival    • Hypoxemia       HPI  Dian Mendoza is a 71 y.o. female who presents to the Emergency Department with altered mental status onset today. Per EMS, patient's son found her altered and confused just prior to arrival. She was last seen normal at 12:00 PM today. She was hypoxic on room air at 84% when EMS arrived. Patient takes Coumadin.     Further history of present illness cannot be obtained due to patient's altered mental status.     REVIEW OF SYSTEMS  See HPI for further details.     Further review of systems cannot be obtained due to patient's altered mental status.     PAST MEDICAL HISTORY   has a past medical history of Lumbar disc herniation with radiculopathy; TMJ (temporomandibular joint syndrome); Protein C deficiency (CMS-HCC); Protein S deficiency (CMS-HCC); Rheumatoid arthritis(714.0); Osteoarthritis; Hypothyroidism; Anemia; Hypopotassemia; Enlargement of lymph nodes; Personal history of arthritis; Anesthesia; Urinary bladder disorder; Seizure (CMS-HCC); Cancer of left breast (CMS-HCC) (1997); Atrial tachycardia, paroxysmal (CMS-HCC); Gastritis; Full dentures; Unspecified asthma(493.90); CATARACT; Pneumonia; COPD; Mixed urge and stress incontinence; Renal atrophy, right; Pulmonary embolism (CMS-HCC); 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 coli sepsis (CMS-HCC); MSSA (methicillin susceptible Staphylococcus aureus); Cholelithiasis (2014); Hypertension ( ); Sleep apnea; LBBB (left bundle branch block); Cardiomyopathy (CMS-HCC) (1/6/2015); Systolic and diastolic CHF, chronic (CMS-HCC); and Pulmonary hypertension  "(CMS-[facility]).    SURGICAL HISTORY   has past surgical history that includes ulcer debridement (6/5/08); flap closure (6/5/08); tonsillectomy; knee arthroscopy; other orthopedic surgery; bursa excision; mastectomy; knee replacement, total (2009); lumbar fusion posterior (1998); abdominal hysterectomy total (1970's); elbow orif (1950); recovery (12/8/2010); gastroscopy with biopsy (12/28/2011); egd w/endoscopic ultrasound (12/28/2011); ercp (12/28/2011); colonoscopy (2001, 6/2010); and victoriano by laparoscopy (8/27/2014).    SOCIAL HISTORY  Social History   Substance Use Topics   • Smoking status: Former Smoker -- 1.50 packs/day for 40 years     Types: Cigarettes     Quit date: 01/01/1972   • Smokeless tobacco: Former User   • Alcohol Use: No      Comment: none since 2007      History   Drug Use No       FAMILY HISTORY  Family History   Problem Relation Age of Onset   • Non-contributory Brother      copd, cirrhosis, alzheimer's mild   • Heart Disease Brother      3 MIs   • Heart Disease Sister    • Heart Disease Mother    • Heart Disease Father    • Cancer Father      esophageal   • GI Sister      colon polyps, precancerous   • Cancer Sister      breast X 2       CURRENT MEDICATIONS  Reviewed.  See Encounter Summary.     ALLERGIES  Allergies   Allergen Reactions   • Atorvastatin      \"I fell and hit my head but it could've been the other medications that I was taken.\"   • Ezetimibe      \"I fell and hit my head but it could've been the other medications that I was taken.\"   • Gabapentin      \"I fell and hit my head but it could've been the other medications that I was taken.\"   • Metoprolol      \"I fell and hit my head but it could've been the other medications that I was taken.\"     • Other Environmental    • Spironolactone      Abrupt renal failure   • Statins [Hmg-Coa-R Inhibitors]      \"I fell and hit my head but it could've been the other medications that I was taken.\"   • Tape Hives and Rash     Plastic tape " "      PHYSICAL EXAM  VITAL SIGNS: Pulse 115  Temp(Src) 36.9 °C (98.5 °F)  Resp 20  Ht 1.753 m (5' 9\")  Wt 72.576 kg (160 lb)  BMI 23.62 kg/m2  SpO2 97%  Constitutional: Awake, Somnolent but arousable  HENT: Normocephalic, Bilateral external ears normal. Nose normal.   Eyes: Conjunctiva normal, non-icteric, EOMI.    Thorax & Lungs: Easy unlabored respirations, Clear to ascultation bilaterally.  Cardiovascular: Regular rate, Regular rhythm, No murmurs, rubs or gallops.  Abdomen:  No distention, no pain with movement.   :  No CVA tenderness to palpation.  Skin: Visualized skin is  Dry, No erythema, No rash.   Extremities:   No cyanosis, clubbing or edema.  Neurologic: Alert, oriented x1. Answers questions normally.   Psychiatric: Normal affect, Normal mood    DIAGNOSTIC STUDIES / PROCEDURES     EKG  Interpreted by me    Rhythm: Sinus tach  Rate: 108  Axis: normal  Ectopy: none  Conduction: LBBB   ST Segments: no acute change  T Waves: no acute change  Q Waves: none  Clinical Impression: Sinus tach, LBBB no sgarbossa criteria, unchanged from prior EKG    RADIOLOGY  DX-CHEST-PORTABLE (1 VIEW)   Final Result         1.  No acute cardiopulmonary disease.   2.  Atherosclerosis   3.  Hyperexpansion of lungs favors changes of COPD.        The radiologist's interpretation of all radiological studies have been reviewed by me.    COURSE & MEDICAL DECISION MAKING  Pertinent Labs & Imaging studies reviewed. (See chart for details)    Reviewed patient's old medical records which showed the patient was admitted last year for COPD with altered mental status.     Differential diagnoses include but are not limited to: sepsis, COPD exacerbation, CAP, hypercarbia    11:54 PM - Patient seen and examined at bedside. Ordered DX-chest, lactic acid, ABG, troponin, ammonia, urine drug screen, diagnostic alcohol, CBC, CMP, blood culture, urinalysis, urine culture, prothrombin, EKG to evaluate her symptoms.     12:25 AM Bedside " ultrasound by me showed hyperdynamic right ventricle and collapsible IVC, negative Lange's sign.    12:31 AM Upon reevaluation, patient is more alert and appropriate. She denies chest pain or headache.     2:10 AM Spoke with Dr. Quiroz, hospitalist, concerning patient case. Agreed to admit patient for further treatment and evaluation.     2:12 AM Paged pulmonary.    2:15 AM I discussed the patient's case and the above findings with  (pulmonary) who will see the patient.     3:41 AM- patient responded to Narcan, she is now sitting up alert and vomiting.     CRITICAL CARE  The very real possibilty of a deterioration of this patient's condition required the highest level of my preparedness for sudden, emergent intervention.  I provided critical care services, which included medication orders, frequent reevaluations of the patient's condition and response to treatment, ordering and reviewing test results, and discussing the case with various consultants.  The critical care time associated with the care of the patient was 45 minutes. Review chart for interventions. This time is exclusive of any other billable procedures.      Decision Making:  This is a 71 y.o. year old female who presents with altered mental status and hypoxia. My initial thought was acute pulmonary embolus however she is supratherapeutic on INR, bedside echo does not show any evidence of right heart strain.  Her presentation is not consistent with acute CVA. She is neurologically intact with the exception of her altered mental status. She was observed for a period of time in the emergency department and had improving cognition.    She does not appear to be septic, she is afebrile, she does not have a leukocytosis and lactic acid is normal. Troponin is slightly elevated at 0.05, this could be demand from tachycardia or from hypoxia. EKG shows left bundle branch block that is unchanged from prior EKGs. She is altered though she denies  any chest pain.    She was given Narcan late in the ED course and had subsequent immediate improvement in cognition. She also began having tremors and vomiting. It appears that the presentation today secondary to opiate overdose. I suspect this caused a very poor by mouth intake, leading to her acute kidney injury.  The patient will be admitted for aggressive fluid resuscitation.       DISPOSITION:  Patient will be admitted to Dr. Quiroz in critical condition.     FINAL IMPRESSION  1. Overdose opiate, accidental or unintentional, initial encounter    2. GAIL (acute kidney injury) (CMS-[facility])       Total critical care time of 45 minutes.      Niko YUNG (Scribe), am scribing for, and in the presence of, Mark Sanchez M.D..    Electronically signed by: Niko Patel (Scribe), 2/9/2017    Mark YUNG M.D. personally performed the services described in this documentation, as scribed by Niko Patel in my presence, and it is both accurate and complete.    The note accurately reflects work and decisions made by me.  Mark Sanchez  2/10/2017  4:23 AM

## 2017-02-10 NOTE — PROGRESS NOTES
Pt transported by wheelchair to [facility] on 1L 02  RN and [employer] student. Report called to Alondra, Zahraa RN and all questions answered. Chart and all meds with pt at time of transfer. Pt transferred in stable and pleasant condition.

## 2017-02-10 NOTE — PROGRESS NOTES
Received patient from ER, noted multiple areas of scabbing, pt is itching and scratching a lot, pt was asked if the house had been bombed for any insect, pt states 2-4 days ago.  Areas are red, slightly inflamed appears to look like bed bug bites, pt states areas started and after a few weeks noted bugs running around house.  Pt was questioned about bed bug.  Pt states could possible be was not sure but they grew to be a big brown insect in living room.  Pt was asked were there a lot and she stated yes.

## 2017-02-10 NOTE — PROGRESS NOTES
Pt seen and examined. Seen today by admitting hospitalist.  Pt with acute renal failure d/t dehydration.  Will repeat bmp, if no significant improvement will consult nephrology. Continue IVF.

## 2017-02-11 PROBLEM — J96.01 ACUTE RESPIRATORY FAILURE WITH HYPOXIA AND HYPERCARBIA (HCC): Status: ACTIVE | Noted: 2017-02-10

## 2017-02-11 PROBLEM — E43 SEVERE PROTEIN-CALORIE MALNUTRITION (HCC): Status: ACTIVE | Noted: 2017-02-11

## 2017-02-11 PROBLEM — J43.9 EMPHYSEMA LUNG (HCC): Status: ACTIVE | Noted: 2017-02-11

## 2017-02-11 PROBLEM — G93.40 ACUTE ENCEPHALOPATHY: Status: ACTIVE | Noted: 2017-02-10

## 2017-02-11 PROBLEM — J96.02 ACUTE RESPIRATORY FAILURE WITH HYPOXIA AND HYPERCARBIA (HCC): Status: ACTIVE | Noted: 2017-02-10

## 2017-02-11 LAB
ANION GAP SERPL CALC-SCNC: 12 MMOL/L (ref 0–11.9)
BASOPHILS # BLD AUTO: 0.4 % (ref 0–1.8)
BASOPHILS # BLD: 0.04 K/UL (ref 0–0.12)
BUN SERPL-MCNC: 28 MG/DL (ref 8–22)
CALCIUM SERPL-MCNC: 8.8 MG/DL (ref 8.5–10.5)
CHLORIDE SERPL-SCNC: 120 MMOL/L (ref 96–112)
CO2 SERPL-SCNC: 15 MMOL/L (ref 20–33)
CREAT SERPL-MCNC: 1.42 MG/DL (ref 0.5–1.4)
EOSINOPHIL # BLD AUTO: 0.34 K/UL (ref 0–0.51)
EOSINOPHIL NFR BLD: 3.4 % (ref 0–6.9)
ERYTHROCYTE [DISTWIDTH] IN BLOOD BY AUTOMATED COUNT: 52.3 FL (ref 35.9–50)
GFR SERPL CREATININE-BSD FRML MDRD: 36 ML/MIN/1.73 M 2
GLUCOSE SERPL-MCNC: 74 MG/DL (ref 65–99)
HCT VFR BLD AUTO: 31.9 % (ref 37–47)
HGB BLD-MCNC: 8.9 G/DL (ref 12–16)
IMM GRANULOCYTES # BLD AUTO: 0.07 K/UL (ref 0–0.11)
IMM GRANULOCYTES NFR BLD AUTO: 0.7 % (ref 0–0.9)
INR PPP: 5.6 (ref 0.87–1.13)
LYMPHOCYTES # BLD AUTO: 1.53 K/UL (ref 1–4.8)
LYMPHOCYTES NFR BLD: 15.4 % (ref 22–41)
MCH RBC QN AUTO: 26.3 PG (ref 27–33)
MCHC RBC AUTO-ENTMCNC: 27.9 G/DL (ref 33.6–35)
MCV RBC AUTO: 94.4 FL (ref 81.4–97.8)
MONOCYTES # BLD AUTO: 0.7 K/UL (ref 0–0.85)
MONOCYTES NFR BLD AUTO: 7.1 % (ref 0–13.4)
NEUTROPHILS # BLD AUTO: 7.24 K/UL (ref 2–7.15)
NEUTROPHILS NFR BLD: 73 % (ref 44–72)
NRBC # BLD AUTO: 0.02 K/UL
NRBC BLD AUTO-RTO: 0.2 /100 WBC
PLATELET # BLD AUTO: 258 K/UL (ref 164–446)
PMV BLD AUTO: 10.9 FL (ref 9–12.9)
POTASSIUM SERPL-SCNC: 4.9 MMOL/L (ref 3.6–5.5)
PROTHROMBIN TIME: 52.4 SEC (ref 12–14.6)
RBC # BLD AUTO: 3.38 M/UL (ref 4.2–5.4)
SODIUM SERPL-SCNC: 147 MMOL/L (ref 135–145)
WBC # BLD AUTO: 9.9 K/UL (ref 4.8–10.8)

## 2017-02-11 PROCEDURE — 85610 PROTHROMBIN TIME: CPT

## 2017-02-11 PROCEDURE — A9270 NON-COVERED ITEM OR SERVICE: HCPCS | Performed by: HOSPITALIST

## 2017-02-11 PROCEDURE — 36415 COLL VENOUS BLD VENIPUNCTURE: CPT

## 2017-02-11 PROCEDURE — 700102 HCHG RX REV CODE 250 W/ 637 OVERRIDE(OP): Performed by: HOSPITALIST

## 2017-02-11 PROCEDURE — 700112 HCHG RX REV CODE 229: Performed by: HOSPITALIST

## 2017-02-11 PROCEDURE — 51798 US URINE CAPACITY MEASURE: CPT

## 2017-02-11 PROCEDURE — 770006 HCHG ROOM/CARE - MED/SURG/GYN SEMI*

## 2017-02-11 PROCEDURE — 99233 SBSQ HOSP IP/OBS HIGH 50: CPT | Performed by: INTERNAL MEDICINE

## 2017-02-11 PROCEDURE — 700105 HCHG RX REV CODE 258: Performed by: INTERNAL MEDICINE

## 2017-02-11 PROCEDURE — 80048 BASIC METABOLIC PNL TOTAL CA: CPT

## 2017-02-11 PROCEDURE — 85025 COMPLETE CBC W/AUTO DIFF WBC: CPT

## 2017-02-11 RX ADMIN — MUPIROCIN 1 APPLICATION: 20 OINTMENT TOPICAL at 09:16

## 2017-02-11 RX ADMIN — LACTULOSE 30 ML: 10 SOLUTION ORAL at 13:30

## 2017-02-11 RX ADMIN — LEVOTHYROXINE SODIUM 125 MCG: 50 TABLET ORAL at 05:59

## 2017-02-11 RX ADMIN — CARVEDILOL 12.5 MG: 12.5 TABLET, FILM COATED ORAL at 09:14

## 2017-02-11 RX ADMIN — TRAZODONE HYDROCHLORIDE 200 MG: 50 TABLET ORAL at 20:18

## 2017-02-11 RX ADMIN — BUPROPION HYDROCHLORIDE 200 MG: 100 TABLET, FILM COATED, EXTENDED RELEASE ORAL at 20:18

## 2017-02-11 RX ADMIN — BUPROPION HYDROCHLORIDE 200 MG: 100 TABLET, FILM COATED, EXTENDED RELEASE ORAL at 09:16

## 2017-02-11 RX ADMIN — MUPIROCIN 1 APPLICATION: 20 OINTMENT TOPICAL at 20:39

## 2017-02-11 RX ADMIN — DOCUSATE SODIUM 100 MG: 100 CAPSULE ORAL at 09:14

## 2017-02-11 RX ADMIN — STANDARDIZED SENNA CONCENTRATE AND DOCUSATE SODIUM 1 TABLET: 8.6; 5 TABLET, FILM COATED ORAL at 20:18

## 2017-02-11 RX ADMIN — CARVEDILOL 12.5 MG: 12.5 TABLET, FILM COATED ORAL at 17:33

## 2017-02-11 RX ADMIN — DOCUSATE SODIUM 100 MG: 100 CAPSULE ORAL at 20:18

## 2017-02-11 RX ADMIN — MUPIROCIN 1 APPLICATION: 20 OINTMENT TOPICAL at 15:30

## 2017-02-11 RX ADMIN — LISINOPRIL 5 MG: 5 TABLET ORAL at 09:14

## 2017-02-11 RX ADMIN — SODIUM CHLORIDE: 9 INJECTION, SOLUTION INTRAVENOUS at 20:38

## 2017-02-11 RX ADMIN — OXYBUTYNIN CHLORIDE 10 MG: 10 TABLET, FILM COATED, EXTENDED RELEASE ORAL at 09:16

## 2017-02-11 RX ADMIN — SODIUM CHLORIDE: 9 INJECTION, SOLUTION INTRAVENOUS at 00:59

## 2017-02-11 ASSESSMENT — PAIN SCALES - GENERAL
PAINLEVEL_OUTOF10: 0
PAINLEVEL_OUTOF10: 0

## 2017-02-11 NOTE — PROGRESS NOTES
Inpatient Anticoagulation Service Note    Date: 2/11/2017  Reason for Anticoagulation: Deep Vein Thrombosis, Pulmonary Embolism, protein C/S deficiency  Target INR: 2.0 to 3.0    Hemoglobin Value: 8.9  Hematocrit Value: 31.9  Lab Platelet Value: 258    INR from last 7 days     Date/Time INR Value    02/11/17 0401 (!)5.6    02/10/17 1312 (!)4.81    02/10/17 0025 (!)3.44        Dose from last 7 days     Date/Time Dose (mg)    02/11/17 1100 0    02/10/17 1312 0    02/10/17 1300 --        Average Dose (mg):  (6 mg daily)  Significant Interactions: Thyroid Medications    Comments: INR continue to trend upward.  No mention of bleeding.      Plan: Will continue to hold warfarin until INR < 3.  Pharmacy will continue to monitor and will restart dosing when appropriate.    Education Material Provided?: No (Chronic warfarin therapy)    Pharmacist suggested discharge dosing: most likely resume home dose of 6 mg daily unless reason for supratherapeutic INR on admission can be identified.  Would recommend f/u INR in 3-5 days of discharge.       Sana Bonilla, PharmD

## 2017-02-11 NOTE — PSYCHIATRY
"PSYCHIATRIC CONSULTATION:  Reason for admission: AMS  Reason for consult: depression  Requesting Physician: Dr. Messer  Psychiatric Supervising Attending: Dr. Bentley  Legal Hold Status:  Not on a legal hold  Guardianship: self      Chief Complaint: AMS, accidental OD    HPI:  71 year old female brought to [facility] by EMS on 2/9/17 after son found her confused and walking around naked.  O2 sats 84% on RA.  Patient had significant improvement after administration of narcan.  Patient had recently filled 150 tablets of 10 mg oxycodone and 60 tablets of 15mg extended release morphine.  Patient developed GAIL likely 2/2 to dehydration as well as metabolic encephalopathy likely 2/2 accidental narcotic OD.  On 2/10/17, patient denied SI.  Spoke with son, Alexander, who lives with his mother (patient), and he has no concern whatsoever that this was a suicide attempt.  He usually helps his mother with her medications, but he says he has been working more hours than normal recently and he hasn't been able to monitor her as well as he usually does.  He says patient's pain pills look just like patient's wellbutrin.  She usually takes a total of 4 welbutrin tabs per day.  Son says he found 5 pain pills missing from the bottle and found one pain pill that had fallen on the floor.  (Unreliable) I asked patient if she tried to overdose on her pills, and she said, \"No.\"  I asked if she tried to kill herself, and she said, \"No.\"  She knows her name, but she is not oriented to place, time, not situation.  I asked her about her psychiatric history, and she said, \"I feel dry.\"  I asked if she had taken Prozac in the past, and she said, \"I feel really, really, really dry.\"      Psychiatric Review of Current Symptoms:  Unreliable    Denies depressed mood, anhedonia, feelings of worthlessness, decreased appetite, decreased energy, trouble concentrating.  Endorses trouble sleeping    Did not review ludmila    When asked about anxiety, pt said, \"I feel " "dry.\"    Denies AH/VH    MSE  General/Appearance: appears stated age, thin, unkempt  Attitude/Behavior:confused, pleasant, disorganized, poor eye contact, no tics, no mannerisms  Speech: fluent, normal tone/rate, latent, no pressured/slurred/mumbling/stuttering  Mood: \"dry\"  Affect: restricted  Thought Content: no suicidal ideation, no plan, no homicidal ideation, denies AH/VH  Thought Process: perseverative,  no loose associations, no flight of ideas, not responding to internal stimuli  Orientation:alert and oriented x 1  Attention: impaired  Memory: impaired   Fund of Knowledge: impaired  Insight: impaired  Judgement: impaired    Past Psych Hx:  Son reports patient took prozac back in the 80s.  He is not aware of patient taking any other psych medications besides welbutrin and prozac.  Son is not aware of patient ever being in an inpatient facility.  Son and patient deny any past suicide attempts.  Son and patient report history of depresssion.    Family Psych Hx: son denies.  Patient unable to answer.      Social Hx: Could not obtain from patient.  Son reports patient is on disability for medical reasons.  Son lives with mother and helps her out.      Drugs/Alcohol/Tobacco Hx: Patient unable to answer.  Son says patient used to smoke, but never abused any other drugs.      Medical Hx:as documented. All the vitals, labs, notes, records, and the MAR. Findings of interest to psychiatry include:            Past Medical History   Diagnosis Date   • Lumbar disc herniation with radiculopathy    • TMJ (temporomandibular joint syndrome)    • Protein C deficiency (CMS-HCC)    • Protein S deficiency (CMS-[facility])    • Rheumatoid arthritis(714.0)    • Osteoarthritis      Spine and knees and jaw   • Hypothyroidism    • Anemia    • Hypopotassemia      Of uncertain cause maintained with potassium replacement.   • Enlargement of lymph nodes      Of uncertain etiology   • Personal history of arthritis      Osteoarthritis including " "TMJ syndrome.   • Anesthesia      very emotional when waking up   • Urinary bladder disorder      Difficulty with Patricio insertion \"obstruction\"   • Seizure (CMS-[facility])      As a child   • Cancer of left breast (CMS-[facility]) 1997     Left breast   • Atrial tachycardia, paroxysmal (CMS-[facility])      occasional arrythmia, has been evaluated by Dr. 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[facility])    • Pulmonary hypertension (CMS-[facility])        Surgical Hx:   Past Surgical History   Procedure Laterality Date   • Ulcer debridement  6/5/08     Performed by ARIELLE MITCHELL at SURGERY [facility]   • Flap closure  6/5/08     Performed by ARIELLE MITCHELL at [facility]   • Tonsillectomy     • Knee arthroscopy       left   • Other orthopedic surgery       BILAT FOOT   • Bursa excision       bursa sac removed bilat hips   • Mastectomy       left   • Knee replacement, total  2009     left   • Lumbar fusion posterior  1998   • Abdominal hysterectomy total  1970's   • Elbow orif  1950     right   • Recovery  12/8/2010     Performed by TERRELL MCKENNA at SURGERY [facility]   • Gastroscopy with biopsy  12/28/2011     Performed by PEPE" RAD KEITH at SUR[facility]ERY [facility]   • Egd w/endoscopic ultrasound  12/28/2011     Performed by RAD CANTU at [facility]   • Ercp  12/28/2011     Performed by RAD CANTU at [facility]   • Colonoscopy  2001, 6/2010     normal   • Patircia by laparoscopy  8/27/2014     Performed by Ajith Kearney M.D. at SURGERY [facility]       Allergies:  Atorvastatin; Ezetimibe; Gabapentin; Metoprolol; Other environmental; Spironolactone; Statins; and Tape    Medications: (current):    Current facility-administered medications:   •  NS infusion, , Intravenous, Continuous, J Luis Bruce M.D., Last Rate: 83 mL/hr at 02/11/17 0059  •  MD ALERT... warfarin (COUMADIN) per pharmacy protocol, , Other, PRN, Azael Quiroz M.D.  •  trazodone (DESYREL) tablet 200 mg, 200 mg, Oral, QHS, Azael Quiroz M.D., 200 mg at 02/10/17 2040  •  oxybutynin SR (DITROPAN-XL) tablet 10 mg, 10 mg, Oral, DAILY, Azael Quiroz M.D., 10 mg at 02/11/17 0916  •  mupirocin (BACTROBAN) 2 % ointment, , Topical, TID, Azael Quiroz M.D., 1 Application at 02/11/17 0916  •  lisinopril (PRINIVIL) tablet 5 mg, 5 mg, Oral, DAILY, Azael Quiroz M.D., 5 mg at 02/11/17 0914  •  levothyroxine (SYNTHROID) tablet 125 mcg, 125 mcg, Oral, AM ES, Azael Quiroz M.D., 125 mcg at 02/11/17 0559  •  carvedilol (COREG) tablet 12.5 mg, 12.5 mg, Oral, BID WITH MEALS, Azael Quiroz M.D., 12.5 mg at 02/11/17 0914  •  buPROPion SR (WELLBUTRIN-SR) tablet 200 mg, 200 mg, Oral, BID, Azael Quiroz M.D., 200 mg at 02/11/17 0916  •  albuterol inhaler 2 Puff, 2 Puff, Inhalation, Q6HRS PRN, Azael Quiroz M.D.  •  docusate sodium (COLACE) capsule 100 mg, 100 mg, Oral, BID, Azael Quiroz M.D., 100 mg at 02/11/17 0914  •  senna-docusate (PERICOLACE or SENOKOT S) 8.6-50 MG per tablet 1 Tab, 1 Tab, Oral, Nightly, Azael Quiroz M.D., 1 Tab at 02/10/17 2040  •  senna-docusate (PERICOLACE or SENOKOT S) 8.6-50 MG per tablet 1 Tab, 1 Tab, Oral,  "Q24HRS PRN, Azael Quiroz M.D.  •  lactulose 20 GM/30ML solution 30 mL, 30 mL, Oral, Q24HRS PRN, Azael Quiroz M.D.  •  bisacodyl (DULCOLAX) suppository 10 mg, 10 mg, Rectal, Q24HRS PRN, Azael Quiroz M.D.  •  fleet enema 133 mL, 1 Each, Rectal, Once PRN, Azael Quiroz M.D.  •  acetaminophen (TYLENOL) tablet 650 mg, 650 mg, Oral, Q6HRS PRN, Azael Quiroz M.D.  •  ondansetron (ZOFRAN) syringe/vial injection 4 mg, 4 mg, Intravenous, Q4HRS PRN, Azael Quiroz M.D.  •  ondansetron (ZOFRAN ODT) dispertab 4 mg, 4 mg, Oral, Q4HRS PRN, Azael Quiroz M.D.  •  Respiratory Care per Protocol, , Nebulization, Continuous RT, Azael Quiroz M.D.  •  naloxone (NARCAN) injection 0.4 mg, 0.4 mg, Intravenous, PRN, Azael Quiroz M.D.    Labs:  BLOOD CULTURE [[id number]] Collected: 02/10/17 0046      Order Status: Completed Specimen Information: Blood from Peripheral Updated: 02/11/17 0647      Significant Indicator NEG       Source BLD       Site PERIPHERAL       Blood Culture --       Result:        No Growth     Note: Blood cultures are incubated for 5 days and   are monitored continuously.Positive blood cultures   are called to the RN and reported as soon as   they are identified.        Narrative:       Per Hospital Policy: Only change Specimen Src: to \"Line\" if  specified by physician order.     BLOOD CULTURE [[id number]] Collected: 02/10/17 0025     Order Status: Completed Specimen Information: Blood from Peripheral Updated: 02/11/17 0647      Significant Indicator NEG       Source BLD       Site PERIPHERAL       Blood Culture --       Result:        No Growth     Note: Blood cultures are incubated for 5 days and   are monitored continuously.Positive blood cultures   are called to the RN and reported as soon as   they are identified.        Narrative:       Per Hospital Policy: Only change Specimen Src: to \"Line\" if  specified by physician order.     ESTIMATED GFR [[id number]] (Abnormal) Collected: 02/11/17 0401     Order Status: " Completed Updated: 02/11/17 0457      GFR If  44 (A) mL/min/1.73 m 2       GFR If Non  36 (A) mL/min/1.73 m 2      BASIC METABOLIC PANEL [[id number]] (Abnormal) Collected: 02/11/17 0401     Order Status: Completed Specimen Information: Blood Updated: 02/11/17 0457      Sodium 147 (H) mmol/L       Potassium 4.9 mmol/L       Chloride 120 (H) mmol/L       Co2 15 (L) mmol/L       Glucose 74 mg/dL       Bun 28 (H) mg/dL       Creatinine 1.42 (H) mg/dL       Calcium 8.8 mg/dL       Anion Gap 12.0 (H)      PROTHROMBIN TIME [[id number]] (Abnormal) Collected: 02/11/17 0401     Order Status: Completed Specimen Information: Blood Updated: 02/11/17 0454      PT 52.4 (H) sec       INR 5.60 (H)      Narrative:       Prothrombin time / INR daily for 3 days; then as ordered  Indicate which anticoagulants the patient is on:->COUMADIN     CBC WITH DIFFERENTIAL [[id number]] (Abnormal) Collected: 02/11/17 0401     Order Status: Completed Specimen Information: Blood Updated: 02/11/17 0436      WBC 9.9 K/uL       RBC 3.38 (L) M/uL       Hemoglobin 8.9 (L) g/dL       Hematocrit 31.9 (L) %       MCV 94.4 fL       MCH 26.3 (L) pg       MCHC 27.9 (L) g/dL       RDW 52.3 (H) fL       Platelet Count 258 K/uL       MPV 10.9 fL       Neutrophils-Polys 73.00 (H) %       Lymphocytes 15.40 (L) %       Monocytes 7.10 %       Eosinophils 3.40 %       Basophils 0.40 %       Immature Granulocytes 0.70 %       Nucleated RBC 0.20 /100 WBC       Neutrophils (Absolute) 7.24 (H) K/uL       Lymphs (Absolute) 1.53 K/uL       Monos (Absolute) 0.70 K/uL       Eos (Absolute) 0.34 K/uL       Baso (Absolute) 0.04 K/uL       Immature Granulocytes (abs) 0.07 K/uL       NRBC (Absolute) 0.02 K/uL      BASIC METABOLIC PANEL [[id number]] (Abnormal) Collected: 02/10/17 8608     Order Status: Completed Specimen Information: Blood Updated: 02/10/17 1846      Sodium 145 mmol/L       Potassium 4.9 mmol/L       Chloride 118 (H) mmol/L       Co2 17  (L) mmol/L       Glucose 73 mg/dL       Bun 34 (H) mg/dL       Creatinine 1.85 (H) mg/dL       Calcium 8.3 (L) mg/dL       Anion Gap 10.0      ESTIMATED GFR [[id number]] (Abnormal) Collected: 02/10/17 1758     Order Status: Completed Updated: 02/10/17 1846      GFR If  32 (A) mL/min/1.73 m 2       GFR If Non  27 (A) mL/min/1.73 m 2      TROPONIN [[id number]] Collected: 02/10/17 1320     Order Status: Completed Specimen Information: Blood Updated: 02/10/17 1400      Troponin I 0.04 ng/mL      PT/INR [[id number]] (Abnormal) Collected: 02/10/17 1312     Order Status: Completed Specimen Information: Blood Updated: 02/10/17 1342      PT 46.5 (H) sec       INR 4.81 (H)      Narrative:       Indicate which anticoagulants the patient is on:->COUMADIN     AMPHETAMINE CONFIRMATION, URINE [[id number]] Collected: 02/09/17 0920     Order Status: Completed Updated: 02/10/17 1234     Narrative:       Indication for culture:->Emergency Room Patient     URINE DRUG SCREEN [[id number]] (Abnormal) Collected: 02/10/17 0920     Order Status: Completed Specimen Information: Urine Updated: 02/10/17 1231      Amphetamines Urine Negative       Barbiturates Negative       Benzodiazepines Negative       Cocaine Metabolite Negative       Methadone Negative       Ecstasy see below       Opiates Positive (A)       Oxycodone Positive (A)       Phencyclidine -Pcp Negative       Propoxyphene Negative       Cannabinoid Metab Negative      Narrative:       Indication for culture:->Emergency Room Patient     URINALYSIS [[id number]] (Abnormal) Collected: 02/10/17 0920     Order Status: Completed Specimen Information: Urine from Urine, Clean Catch Updated: 02/10/17 1037      Color Yellow       Character Clear       Specific Gravity 1.012       Ph 5.0       Glucose Negative mg/dL       Ketones 10 (A) mg/dL       Protein Negative mg/dL       Bilirubin Negative       Nitrite Negative       Leukocyte Esterase Negative       Occult  Blood Negative       Micro Urine Req see below      Narrative:       Indication for culture:->Emergency Room Patient     CREATINE KINASE [[id number]] (Abnormal) Collected: 02/10/17 0625     Order Status: Completed Updated: 02/10/17 1034      CPK Total 276 (H) U/L      URINE CULTURE(NEW) [[id number]] Collected: 02/10/17 0920     Order Status: Completed Specimen Information: Urine from Urine, Clean Catch Updated: 02/10/17 1019     Narrative:       Indication for culture:->Emergency Room Patient     TROPONIN [[id number]] (Abnormal) Collected: 02/10/17 0625     Order Status: Completed Specimen Information: Blood Updated: 02/10/17 0831      Troponin I 0.06 (H) ng/mL      SALICYLATE [[id number]] (Abnormal) Collected: 02/10/17 0025     Order Status: Completed Updated: 02/10/17 0347      Salicylates, Quant. 0 (L) mg/dL      ACETAMINOPHEN [[id number]] Collected: 02/10/17 0025     Order Status: Completed Updated: 02/10/17 0347      Acetomenophen -Tylenol <10 ug/mL      CREATINE KINASE [[id number]] Collected: 02/10/17 0025     Order Status: Completed Updated: 02/10/17 0347      CPK Total 125 U/L      BETA-HYDROXYBUTYRIC ACID [[id number]] (Abnormal) Collected: 02/10/17 0025     Order Status: Completed Specimen Information: Blood Updated: 02/10/17 0323      beta-Hydroxybutyric Acid 2.87 (H) mmol/L      OSMOLALITY SERUM [[id number]] (Abnormal) Collected: 02/10/17 0025     Order Status: Completed Specimen Information: Blood Updated: 02/10/17 0311      Osmolality Serum 305 (H) mOsm/kg H2O      TROPONIN [[id number]] (Abnormal) Collected: 02/10/17 0025     Order Status: Completed Specimen Information: Blood Updated: 02/10/17 0141      Troponin I 0.05 (H) ng/mL      CBC WITH DIFFERENTIAL [[id number]] (Abnormal) Collected: 02/10/17 0025     Order Status: Completed Specimen Information: Blood Updated: 02/10/17 0115      WBC 9.4 K/uL       RBC 3.76 (L) M/uL       Hemoglobin 10.1 (L) g/dL       Hematocrit 34.8 (L) %       MCV 92.6 fL       MCH 26.9  (L) pg       MCHC 29.0 (L) g/dL       RDW 50.8 (H) fL       Platelet Count 376 K/uL       MPV 10.5 fL       Nucleated RBC 0.00 /100 WBC       NRBC (Absolute) 0.00 K/uL       Neutrophils-Polys 79.80 (H) %       Lymphocytes 13.10 (L) %       Monocytes 0.90 %       Eosinophils 5.30 %       Basophils 0.90 %       Neutrophils (Absolute) 7.50 (H) K/uL       Lymphs (Absolute) 1.23 K/uL       Monos (Absolute) 0.08 K/uL       Eos (Absolute) 0.50 K/uL       Baso (Absolute) 0.08 K/uL      DIFFERENTIAL MANUAL [[id number]] Collected: 02/10/17 0025     Order Status: Completed Updated: 02/10/17 0115      Manual Diff Status PERFORMED      PLATELET ESTIMATE [[id number]] Collected: 02/10/17 0025     Order Status: Completed Updated: 02/10/17 0115      Plt Estimation Normal      MORPHOLOGY [[id number]] Collected: 02/10/17 0025     Order Status: Completed Updated: 02/10/17 0115      RBC Morphology Present       Large Platelets 1+       Poikilocytosis 1+       Ovalocytes 1+      PERIPHERAL SMEAR REVIEW [[id number]] Collected: 02/10/17 0025     Order Status: Completed Updated: 02/10/17 0115      Peripheral Smear Review see below      INFLUENZA RAPID [[id number]] Collected: 02/10/17 0045     Order Status: Completed Specimen Information: Respirate from Respiratory Updated: 02/10/17 0114      Significant Indicator NEG       Source RESP       Site RESPIRATORY       Rapid Influenza A-B --       Result:        Negative for Influenza A and Influenza B antigens.   Infection due to influenza A or B cannot be ruled out   since the antigen present in the specimen may be below the   detection limit of the test. Culture confirmation of   negative samples is recommended.        ESTIMATED GFR [[id number]] (Abnormal) Collected: 02/10/17 0025     Order Status: Completed Updated: 02/10/17 0107      GFR If  12 (A) mL/min/1.73 m 2       GFR If Non African American 10 (A) mL/min/1.73 m 2      COMP METABOLIC PANEL [[id number]] (Abnormal) Collected:  02/10/17 0025     Order Status: Completed Specimen Information: Blood Updated: 02/10/17 0107      Sodium 136 mmol/L       Potassium 5.0 mmol/L       Chloride 104 mmol/L       Co2 18 (L) mmol/L       Anion Gap 14.0 (H)       Glucose 81 mg/dL       Bun 46 (H) mg/dL       Creatinine 4.38 (H) mg/dL       Calcium 9.3 mg/dL       AST(SGOT) 72 (H) U/L       ALT(SGPT) 91 (H) U/L       Alkaline Phosphatase 104 (H) U/L       Total Bilirubin 0.2 mg/dL       Albumin 4.1 g/dL       Total Protein 7.4 g/dL       Globulin 3.3 g/dL       A-G Ratio 1.2 g/dL      AMMONIA [[id number]] Collected: 02/10/17 0025     Order Status: Completed Specimen Information: Blood Updated: 02/10/17 0107      Ammonia 32 umol/L      DIAGNOSTIC ALCOHOL [[id number]] Collected: 02/10/17 0025     Order Status: Completed Specimen Information: Blood Updated: 02/10/17 0107      Diagnostic Alcohol 0.00 g/dL      PROTHROMBIN TIME [[id number]] (Abnormal) Collected: 02/10/17 0025     Order Status: Completed Updated: 02/10/17 0100      PT 35.7 (H) sec       INR 3.44 (H)      ARTERIAL BLOOD GAS w/ O2 (LAB) [[id number]] (Abnormal) Collected: 02/10/17 0039     Order Status: Completed Specimen Information: Blood Updated: 02/10/17 0100      Ph 7.11 (LL)       Pco2 50.3 (H) mmHg       Po2 164.2 (H) mmHg       O2 Saturation 98.9 %       Hco3 16 (L) mmol/L       Base Excess -13 (L) mmol/L       Body Temp see below Centigrade      Lactic acid (lactate) [[id number]] Collected: 02/10/17 0025     Order Status: Completed Specimen Information: Blood Updated: 02/10/17 0043      Lactic Acid 1.3 mmol/L      CARBOXYHEMOGLOBIN [[id number]] Collected: 02/10/17 0025     Order Status: Completed Updated: 02/10/17 0043      Carbon Monoxide-Co 3.20 %            Imaging:  DX-CHEST-PORTABLE (1 VIEW)   Final Result         1.  No acute cardiopulmonary disease.   2.  Atherosclerosis   3.  Hyperexpansion of lungs favors changes of COPD.          EEG/Tests:none    EK17  Intervals                                 Axis   Rate:       108                          P:          -72   AL:         140                          QRS:        3   QRSD:       138                          T:          118   QT:         376   QTc:        504    Medical Review of Symptoms: All systems reviewed. Those not listed below were found to be negative.     Neurological: Son denies patient Hx of TBI, Sz, Stroke    Assessment:  Psychiatric:   -Unspecified Depressive Disorder.  Took prozac in the 80s.  Currently on Welbutrin.    Currently delirious and unable to give reliable Hx.    Medical: as noted by the medical treatment team.    Plan:  -After talking with son, I do NOT believe this to have been a suicide attempt.  Patient denies attempting suicide, yet patient history is currently unreliable.  Unclear at this point if welbutrin is working for patient as she is unable to describe how she has been feeling.  Son did say that she did very well on prozac in the past.  If patient clears and would like to switch to prozac, then that would be an acceptable option.  Patient apparently has chronic pain making duloxetine a medication to be considered.  No medication changes at this time.  Please call Psych line when patient clears.  Legal hold:N/A  Records reviewed: yes       Collateral: obtained with permission    Will Follow          Thank you for the Consult

## 2017-02-11 NOTE — PROGRESS NOTES
"Patient lethargic, disoriented. Responds to name but can not tell me her last name or . She was able to tell me she is in the hospital. \"No pain.\" Took in an apple sauce but nothing else for breakfast. Patient up to chair for a few minutes but was not able to tolerate due to jerking movements and attempting to get out of chair without assistance. Patient falls back to sleep directly after I exit the room. Patricio in place with clear straw urine.   "

## 2017-02-11 NOTE — PROGRESS NOTES
Received report and assumed care of the patient. Patient is alert and oriented to self. Bed alarm on. Bed in low and locked position. Call light and belongings within reach. Treaded socks on. Communication board updated. Hourly rounding in place.

## 2017-02-11 NOTE — RESPIRATORY CARE
COPD EDUCATION by COPD CLINICAL EDUCATOR  2/11/2017 at 7:12 AM by Jud Dumont     Patient reviewed by COPD education team. Patient does not qualify for COPD program.

## 2017-02-11 NOTE — CARE PLAN
Problem: Safety  Goal: Will remain free from injury  Bed alarm on. Bed in low and locked position. Call light and belongings within reach. Treaded socks on. Hourly rounding in place.     Problem: Respiratory:  Goal: Respiratory status will improve  Oxygen at 3L NC.  on.

## 2017-02-12 LAB
BACTERIA UR CULT: NORMAL
INR PPP: 4.59 (ref 0.87–1.13)
PROTHROMBIN TIME: 44.8 SEC (ref 12–14.6)
SIGNIFICANT IND 70042: NORMAL
SITE SITE: NORMAL
SOURCE SOURCE: NORMAL

## 2017-02-12 PROCEDURE — 700102 HCHG RX REV CODE 250 W/ 637 OVERRIDE(OP): Performed by: HOSPITALIST

## 2017-02-12 PROCEDURE — 700111 HCHG RX REV CODE 636 W/ 250 OVERRIDE (IP): Performed by: HOSPITALIST

## 2017-02-12 PROCEDURE — 770006 HCHG ROOM/CARE - MED/SURG/GYN SEMI*

## 2017-02-12 PROCEDURE — 700112 HCHG RX REV CODE 229: Performed by: HOSPITALIST

## 2017-02-12 PROCEDURE — A9270 NON-COVERED ITEM OR SERVICE: HCPCS | Performed by: HOSPITALIST

## 2017-02-12 PROCEDURE — 99232 SBSQ HOSP IP/OBS MODERATE 35: CPT | Performed by: INTERNAL MEDICINE

## 2017-02-12 PROCEDURE — 85610 PROTHROMBIN TIME: CPT

## 2017-02-12 PROCEDURE — 36415 COLL VENOUS BLD VENIPUNCTURE: CPT

## 2017-02-12 PROCEDURE — 51798 US URINE CAPACITY MEASURE: CPT

## 2017-02-12 RX ADMIN — DOCUSATE SODIUM 100 MG: 100 CAPSULE ORAL at 07:50

## 2017-02-12 RX ADMIN — OXYBUTYNIN CHLORIDE 10 MG: 10 TABLET, FILM COATED, EXTENDED RELEASE ORAL at 07:50

## 2017-02-12 RX ADMIN — ONDANSETRON 4 MG: 2 INJECTION, SOLUTION INTRAMUSCULAR; INTRAVENOUS at 21:36

## 2017-02-12 RX ADMIN — STANDARDIZED SENNA CONCENTRATE AND DOCUSATE SODIUM 1 TABLET: 8.6; 5 TABLET, FILM COATED ORAL at 21:10

## 2017-02-12 RX ADMIN — BUPROPION HYDROCHLORIDE 100 MG: 100 TABLET, FILM COATED, EXTENDED RELEASE ORAL at 07:50

## 2017-02-12 RX ADMIN — MUPIROCIN 2 APPLICATION: 20 OINTMENT TOPICAL at 07:51

## 2017-02-12 RX ADMIN — ACETAMINOPHEN 650 MG: 325 TABLET, FILM COATED ORAL at 21:23

## 2017-02-12 RX ADMIN — CARVEDILOL 12.5 MG: 12.5 TABLET, FILM COATED ORAL at 16:40

## 2017-02-12 RX ADMIN — DOCUSATE SODIUM 100 MG: 100 CAPSULE ORAL at 21:10

## 2017-02-12 RX ADMIN — BUPROPION HYDROCHLORIDE 200 MG: 100 TABLET, FILM COATED, EXTENDED RELEASE ORAL at 21:10

## 2017-02-12 RX ADMIN — LISINOPRIL 5 MG: 5 TABLET ORAL at 07:50

## 2017-02-12 RX ADMIN — TRAZODONE HYDROCHLORIDE 200 MG: 50 TABLET ORAL at 21:09

## 2017-02-12 RX ADMIN — MUPIROCIN 2 APPLICATION: 20 OINTMENT TOPICAL at 13:57

## 2017-02-12 RX ADMIN — CARVEDILOL 12.5 MG: 12.5 TABLET, FILM COATED ORAL at 07:50

## 2017-02-12 RX ADMIN — LEVOTHYROXINE SODIUM 125 MCG: 50 TABLET ORAL at 05:05

## 2017-02-12 RX ADMIN — MUPIROCIN 1 APPLICATION: 20 OINTMENT TOPICAL at 21:10

## 2017-02-12 ASSESSMENT — ENCOUNTER SYMPTOMS
SHORTNESS OF BREATH: 0
ABDOMINAL PAIN: 0
NAUSEA: 0
VOMITING: 0
COUGH: 0
WEAKNESS: 1

## 2017-02-12 ASSESSMENT — PAIN SCALES - GENERAL
PAINLEVEL_OUTOF10: 0
PAINLEVEL_OUTOF10: 7
PAINLEVEL_OUTOF10: 0
PAINLEVEL_OUTOF10: 0

## 2017-02-12 NOTE — PROGRESS NOTES
Patient voided over 400cc of clear yellow urine. Will bladder scan again at 0800. No need to straight cath at this time.

## 2017-02-12 NOTE — PROGRESS NOTES
Hospital Medicine Progress Note, Adult, Complex               Author: Julieta Mayen Date & Time created: 2/11/2017  4:24 PM     Interval History:  This is a 70 YO female w/ PMH protein C&S deficiency, on chronic warfarin s/p IVC filter, hypothyroidism (on Levothryoxine) chronic pain and rheumatoid arthritis (on MS contin and Oxycodone/ APAP 10/325) cardiomyopathy EF40% (lisinopril, Coreg), Mood disorder (trazodone and wellbutrin) who was admitted in 11/2016 w/ respiratory failure and SIRS. She presents w/ Obtundation, altered mentation and respiratory failure w/ hypoxemia somewhat improved w/ narcan. She was monitored in ICU, remained stable and is now on medical floor. Patient remains confused and lethargic but fidgety.    Review of Systems:  Review of Systems   Unable to perform ROS: mental acuity       Physical Exam:  Physical Exam   Constitutional: She appears well-developed. No distress.   Cachectic, advanced muscle wasting, disheveled   HENT:   Head: Normocephalic and atraumatic.   Oral mucosa w/ decreased saliva   Eyes:   Doesn't track or focus   Neck: Normal range of motion. Neck supple.   Head tossing back and forth   Cardiovascular: Regular rhythm.    tachy   Pulmonary/Chest: Effort normal.   Decreased sounds   Abdominal: She exhibits no distension. There is no tenderness.   Decreased BS   Genitourinary:   horn   Musculoskeletal: She exhibits no edema or tenderness.   RA changes, muscle wasting   Neurological:   Writhing restlessly in bed   Skin: Skin is warm and dry. She is not diaphoretic.   Poor turgor   Psychiatric:   flat   Nursing note and vitals reviewed.      Labs:  Recent Labs      02/10/17   0039   [id number]  7.11*   [id number]  50.3*   [id number]  164.2*   [id number]  98.9   ARTHCO3  16*   ARTBE  -13*     Recent Labs      02/10/17   0025  02/10/17   0625  02/10/17   1320   CPKTOTAL  125  276*   --    TROPONINI  0.05*  0.06*  0.04     Recent Labs      02/10/17   0025  02/10/17   8748   17   0401   SODIUM  136  145  147*   POTASSIUM  5.0  4.9  4.9   CHLORIDE  104  118*  120*   CO2  18*  17*  15*   BUN  46*  34*  28*   CREATININE  4.38*  1.85*  1.42*   CALCIUM  9.3  8.3*  8.8     Recent Labs      02/10/17   0025  02/10/17   1758  17   0401   ALTSGPT  91*   --    --    ASTSGOT  72*   --    --    ALKPHOSPHAT  104*   --    --    TBILIRUBIN  0.2   --    --    GLUCOSE  81  73  74     Recent Labs      02/10/17   0025  02/10/17   1312  17   0401   RBC  3.76*   --   3.38*   HEMOGLOBIN  10.1*   --   8.9*   HEMATOCRIT  34.8*   --   31.9*   PLATELETCT  376   --   258   PROTHROMBTM  35.7*  46.5*  52.4*   INR  3.44*  4.81*  5.60*     Recent Labs      02/10/17   0025  17   0401   WBC  9.4  9.9   NEUTSPOLYS  79.80*  73.00*   LYMPHOCYTES  13.10*  15.40*   MONOCYTES  0.90  7.10   EOSINOPHILS  5.30  3.40   BASOPHILS  0.90  0.40   ASTSGOT  72*   --    ALTSGPT  91*   --    ALKPHOSPHAT  104*   --    TBILIRUBIN  0.2   --            Hemodynamics:  Temp (24hrs), Av.8 °C (98.2 °F), Min:36.1 °C (96.9 °F), Max:37.4 °C (99.3 °F)  Temperature: 36.1 °C (96.9 °F)  Pulse  Av  Min: 93  Max: 122  Blood Pressure : 148/61 mmHg     Respiratory:    Respiration: 19, Pulse Oximetry: 100 %     Work Of Breathing / Effort: Mild  RUL Breath Sounds: Diminished, RML Breath Sounds: Diminished, RLL Breath Sounds: Diminished, YARELI Breath Sounds: Diminished, LLL Breath Sounds: Diminished  Fluids:    Intake/Output Summary (Last 24 hours) at 17 1624  Last data filed at 17 0600   Gross per 24 hour   Intake      0 ml   Output   2150 ml   Net  -2150 ml        GI/Nutrition:  Orders Placed This Encounter   Procedures   • Diet Order     Standing Status: Standing      Number of Occurrences: 1      Standing Expiration Date:      Order Specific Question:  Diet:     Answer:  Regular [1]     Medical Decision Making, by Problem:  Active Hospital Problems    Diagnosis   • Acute encephalopathy - 2/2 drug OD and  "hypoxemia [G93.40] still not to baseline, cultures negative   • Acute respiratory failure with hypoxia and hypercarbia (CMS-[facility]) [J96.01, J96.02] 100% 4L   • ARF (acute renal failure) (CMS-HCC) [N17.9] improved w/ hydration but not to baseline, continue IVF   • Coagulopathy (CMS-HCC) [D68.9] INR still too high   • Opioid dependence (CMS-HCC) [F11.20] causing significant morbidity, patient is emaciated   • Emphysema lung (CMS-HCC) [J43.9] no wheezing   • Starvation ketoacidosis [E87.2] not awake enough to eat. She is severely malnourished   • Metabolic acidosis [E87.2]\"   Severe protein calorie malnutrition.    Labs reviewed and Medications reviewed  Horn Catheter: ordering d/c horn.      DVT Prophylaxis: Warfarin (Coumadin) (currently supratherapuetic)    Ulcer prophylaxis: Yes    Assessed for rehab: Patient unable to tolerate rehabilitation therapeutic regimen        "

## 2017-02-12 NOTE — PROGRESS NOTES
"Hospital Medicine Progress Note, Adult, Complex               Author: Julieta Mayen Date & Time created: 2/12/2017  2:11 PM     Interval History:  This is a 70 YO female w/ PMH protein C&S deficiency, on chronic warfarin s/p IVC filter, hypothyroidism (on Levothryoxine) chronic pain and rheumatoid arthritis (on MS contin and Oxycodone/ APAP 10/325) cardiomyopathy EF40% (lisinopril, Coreg), Mood disorder (trazodone and wellbutrin) who was admitted in 11/2016 w/ respiratory failure and SIRS. She presents w/ Obtundation, altered mentation and respiratory failure w/ hypoxemia somewhat improved w/ narcan. She was monitored in ICU, remained stable and is now on medical floor.     2/11 groggy restless and 'out of it'  2/12 awake oriented seen w/ RN. Baseline mobility is w/ motorized device and self transfers, son lives next to her and checks on her \"without him I'd be in a nursing home\" she has multiple bandaids covering small dark eschars on her legs, believes that they are bug bites but they do not have that appearance, denies trauma  (prior diagnosis list includes \"delusions of parasitosis)      Review of Systems:  Review of Systems   Respiratory: Negative for cough and shortness of breath.    Cardiovascular: Negative for chest pain.   Gastrointestinal: Negative for nausea, vomiting and abdominal pain.   Musculoskeletal:        Chronic Musculoskeletal pain   Skin: Positive for itching and rash.   Neurological: Positive for weakness (chronic).       Physical Exam:  Physical Exam   Constitutional: She is oriented to person, place, and time. She appears well-developed. No distress.   Cachectic, advanced muscle wasting, chronically ill   HENT:   Head: Normocephalic and atraumatic.   Mouth/Throat: Oropharynx is clear and moist.   Eyes: EOM are normal. Pupils are equal, round, and reactive to light.   Neck: Normal range of motion. Neck supple.   Cardiovascular: Normal rate and regular rhythm.    Pulmonary/Chest: Effort " normal and breath sounds normal.   Abdominal: Soft. Bowel sounds are normal. She exhibits no distension. There is no tenderness.   Musculoskeletal: She exhibits edema. She exhibits no tenderness.   RA changes, muscle wasting   Neurological: She is alert and oriented to person, place, and time. No cranial nerve deficit.   Writhing restlessly in bed   Skin: Skin is warm and dry. She is not diaphoretic. There is pallor.   Edema below knees and multiple bandaids and dark eschars   Psychiatric:   anxious   Nursing note and vitals reviewed.      Labs:  Recent Labs      02/10/17   0039   [id number]  7.11*   [id number]  50.3*   [id number]  164.2*   [id number]  98.9   ARTHCO3  16*   ARTBE  -13*     Recent Labs      02/10/17   0025  02/10/17   0625  02/10/17   1320   CPKTOTAL  125  276*   --    TROPONINI  0.05*  0.06*  0.04     Recent Labs      02/10/17   0025  02/10/17   1758  17   0401   SODIUM  136  145  147*   POTASSIUM  5.0  4.9  4.9   CHLORIDE  104  118*  120*   CO2  18*  17*  15*   BUN  46*  34*  28*   CREATININE  4.38*  1.85*  1.42*   CALCIUM  9.3  8.3*  8.8     Recent Labs      02/10/17   0025  02/10/17   1758  17   0401   ALTSGPT  91*   --    --    ASTSGOT  72*   --    --    ALKPHOSPHAT  104*   --    --    TBILIRUBIN  0.2   --    --    GLUCOSE  81  73  74     Recent Labs      02/10/17   0025  02/10/17   1312  17   0401  17   0338   RBC  3.76*   --   3.38*   --    HEMOGLOBIN  10.1*   --   8.9*   --    HEMATOCRIT  34.8*   --   31.9*   --    PLATELETCT  376   --   258   --    PROTHROMBTM  35.7*  46.5*  52.4*  44.8*   INR  3.44*  4.81*  5.60*  4.59*     Recent Labs      02/10/17   0025  17   0401   WBC  9.4  9.9   NEUTSPOLYS  79.80*  73.00*   LYMPHOCYTES  13.10*  15.40*   MONOCYTES  0.90  7.10   EOSINOPHILS  5.30  3.40   BASOPHILS  0.90  0.40   ASTSGOT  72*   --    ALTSGPT  91*   --    ALKPHOSPHAT  104*   --    TBILIRUBIN  0.2   --            Hemodynamics:  Temp (24hrs), Av.4 °C (97.5 °F),  Min:36.1 °C (96.9 °F), Max:36.9 °C (98.4 °F)  Temperature: 36.5 °C (97.7 °F)  Pulse  Av.2  Min: 75  Max: 122  Blood Pressure : 148/88 mmHg     Respiratory:    Respiration: 19, Pulse Oximetry: 100 %     Work Of Breathing / Effort: Mild  RUL Breath Sounds: Diminished, RML Breath Sounds: Diminished, RLL Breath Sounds: Diminished, YARELI Breath Sounds: Diminished, LLL Breath Sounds: Diminished  Fluids:    Intake/Output Summary (Last 24 hours) at 17 1411  Last data filed at 17 0500   Gross per 24 hour   Intake   1531 ml   Output    750 ml   Net    781 ml        GI/Nutrition:  Orders Placed This Encounter   Procedures   • Diet Order     Standing Status: Standing      Number of Occurrences: 1      Standing Expiration Date:      Order Specific Question:  Diet:     Answer:  Regular [1]     Medical Decision Making, by Problem:  Active Hospital Problems    Diagnosis   • Acute encephalopathy - 2/2 drug OD and hypoxemia [G93.40] @ baseline   • Acute respiratory failure with hypoxia and hypercarbia (CMS-HCC) [J96.01, J96.02] 100% 4L   • ARF (acute renal failure) (CMS-HCC) [N17.9] bmp in AM, probably will stop IVF as legs edematous   • Coagulopathy (CMS-HCC) [D68.9] INR still too high   • Opioid dependence (CMS-HCC) [F11.20] causing significant morbidity, patient is emaciated   • Emphysema lung (CMS-HCC) [J43.9] no wheezing   • Starvation ketoacidosis [E87.2] regular diet   • Metabolic acidosis [E87.2]BMP in AM   Severe protein calorie malnutrition.  Skin lesions- suspect self mutilation    Labs reviewed and Medications reviewed  Patricio catheter: No Patricio      DVT Prophylaxis: Warfarin (Coumadin) (currently supratherapuetic)    Ulcer prophylaxis: Yes    Assessed for rehab: Patient unable to tolerate rehabilitation therapeutic regimen

## 2017-02-12 NOTE — CARE PLAN
Problem: Safety  Goal: Will remain free from injury  Bed alarm on. Bed in low and locked position. Call light and belongings within reach. Treaded socks on. Hourly rounding in place.     Problem: Mobility  Goal: Risk for activity intolerance will decrease  Up to bedside commode with 2 max assist. Very unsteady.

## 2017-02-12 NOTE — PROGRESS NOTES
Received report and assumed care of the patient. Patient is alert to self only. Bed alarm on. Bed in low and locked position. Call light and belongings within reach. Treaded socks on. Patricio removed on day shift, awaiting for patient to void. Communication board updated. Hourly rounding in place.

## 2017-02-12 NOTE — PROGRESS NOTES
Inpatient Anticoagulation Service Note    Date: 2/12/2017  Reason for Anticoagulation: Deep Vein Thrombosis, Pulmonary Embolism, Other (Comments) (protein C/S deficiency)        Hemoglobin Value: 8.9  Hematocrit Value: 31.9  Lab Platelet Value: 258  Target INR: 2.0 to 3.0    INR from last 7 days     Date/Time INR Value    02/12/17 0338 (!)4.59    02/11/17 0401 (!)5.6    02/10/17 1312 (!)4.81    02/10/17 0025 (!)3.44        Dose from last 7 days     Date/Time Dose (mg)    02/12/17 1500 0    02/11/17 1100 0    02/10/17 1312 0    02/10/17 1300 --        Average Dose (mg):  (6 mg daily)  Significant Interactions: Thyroid Medications     Comments: INR supra therapeutic but starting to trend down.  No mention of bleeding.  Will continue to hold warfarin until INR < 3.  Pharmacy will continue to monitor and will restart dosing when appropriate. May be able to resume tomorrow    Plan:  HOLD Coumadin today.   Education Material Provided?: No (Chronic warfarin therapy)  Pharmacist suggested discharge dosing: Resume home dose of 6 mg daily unless reason for supratherapeutic INR on admission can be identified.     Brenda Del Angel, PharmD

## 2017-02-12 NOTE — PROGRESS NOTES
Sat in chair for breakfast for 1 hr.  Shakey but stable with help. Using urinal.  Scabs and cream applied.   No  B/p or draws on l arm.  o2 at 3 L.  Recheck b/p at noon. Took all b/p meds. Eating well.  More awake today.

## 2017-02-13 ENCOUNTER — HOME HEALTH ADMISSION (OUTPATIENT)
Dept: HOME HEALTH SERVICES | Facility: HOME HEALTHCARE | Age: 72
End: 2017-02-13
Payer: MEDICARE

## 2017-02-13 VITALS
OXYGEN SATURATION: 97 % | TEMPERATURE: 97 F | WEIGHT: 138.45 LBS | HEIGHT: 69 IN | HEART RATE: 69 BPM | SYSTOLIC BLOOD PRESSURE: 140 MMHG | DIASTOLIC BLOOD PRESSURE: 59 MMHG | BODY MASS INDEX: 20.51 KG/M2 | RESPIRATION RATE: 14 BRPM

## 2017-02-13 LAB
AMPHET UR CFM-MCNC: <200 NG/ML
ANION GAP SERPL CALC-SCNC: 3 MMOL/L (ref 0–11.9)
BUN SERPL-MCNC: 18 MG/DL (ref 8–22)
CALCIUM SERPL-MCNC: 8.5 MG/DL (ref 8.5–10.5)
CHLORIDE SERPL-SCNC: 121 MMOL/L (ref 96–112)
CO2 SERPL-SCNC: 22 MMOL/L (ref 20–33)
CREAT SERPL-MCNC: 0.85 MG/DL (ref 0.5–1.4)
GFR SERPL CREATININE-BSD FRML MDRD: >60 ML/MIN/1.73 M 2
GLUCOSE SERPL-MCNC: 98 MG/DL (ref 65–99)
INR PPP: 1.62 (ref 0.87–1.13)
MDA UR CFM-MCNC: <200 NG/ML
MDEA UR CFM-MCNC: <200 NG/ML
MDMA UR CFM-MCNC: <200 NG/ML
METHAMPHET UR CFM-MCNC: <200 NG/ML
POTASSIUM SERPL-SCNC: 3.9 MMOL/L (ref 3.6–5.5)
PROTHROMBIN TIME: 19.7 SEC (ref 12–14.6)
SODIUM SERPL-SCNC: 146 MMOL/L (ref 135–145)

## 2017-02-13 PROCEDURE — A9270 NON-COVERED ITEM OR SERVICE: HCPCS | Performed by: HOSPITALIST

## 2017-02-13 PROCEDURE — 36415 COLL VENOUS BLD VENIPUNCTURE: CPT

## 2017-02-13 PROCEDURE — 80048 BASIC METABOLIC PNL TOTAL CA: CPT

## 2017-02-13 PROCEDURE — 700102 HCHG RX REV CODE 250 W/ 637 OVERRIDE(OP): Performed by: HOSPITALIST

## 2017-02-13 PROCEDURE — 99239 HOSP IP/OBS DSCHRG MGMT >30: CPT | Performed by: INTERNAL MEDICINE

## 2017-02-13 PROCEDURE — 700112 HCHG RX REV CODE 229: Performed by: HOSPITALIST

## 2017-02-13 PROCEDURE — 85610 PROTHROMBIN TIME: CPT

## 2017-02-13 PROCEDURE — 700105 HCHG RX REV CODE 258: Performed by: INTERNAL MEDICINE

## 2017-02-13 RX ORDER — WARFARIN SODIUM 6 MG/1
6 TABLET ORAL
Status: DISCONTINUED | OUTPATIENT
Start: 2017-02-13 | End: 2017-02-13 | Stop reason: HOSPADM

## 2017-02-13 RX ADMIN — OXYBUTYNIN CHLORIDE 10 MG: 10 TABLET, FILM COATED, EXTENDED RELEASE ORAL at 09:20

## 2017-02-13 RX ADMIN — LEVOTHYROXINE SODIUM 125 MCG: 50 TABLET ORAL at 05:45

## 2017-02-13 RX ADMIN — MUPIROCIN 1 APPLICATION: 20 OINTMENT TOPICAL at 09:19

## 2017-02-13 RX ADMIN — BUPROPION HYDROCHLORIDE 200 MG: 100 TABLET, FILM COATED, EXTENDED RELEASE ORAL at 09:19

## 2017-02-13 RX ADMIN — LISINOPRIL 5 MG: 5 TABLET ORAL at 09:19

## 2017-02-13 RX ADMIN — CARVEDILOL 12.5 MG: 12.5 TABLET, FILM COATED ORAL at 09:19

## 2017-02-13 RX ADMIN — DOCUSATE SODIUM 100 MG: 100 CAPSULE ORAL at 09:19

## 2017-02-13 RX ADMIN — MUPIROCIN 1 APPLICATION: 20 OINTMENT TOPICAL at 15:00

## 2017-02-13 RX ADMIN — SODIUM CHLORIDE: 9 INJECTION, SOLUTION INTRAVENOUS at 05:46

## 2017-02-13 ASSESSMENT — PAIN SCALES - GENERAL
PAINLEVEL_OUTOF10: 3
PAINLEVEL_OUTOF10: 4

## 2017-02-13 ASSESSMENT — LIFESTYLE VARIABLES: EVER_SMOKED: YES

## 2017-02-13 NOTE — DISCHARGE SUMMARY
CHIEF COMPLAINT ON ADMISSION  Chief Complaint   Patient presents with   • ALOC     last known well at 1200. A/O x1 on arrival    • Hypoxemia       CODE STATUS  Full Code    HPI & HOSPITAL COURSE  This is a 72 YO female w/ PMH protein C&S deficiency, on chronic warfarin s/p IVC filter, hypothyroidism (on Levothryoxine) chronic pain and rheumatoid arthritis (on MS contin and Oxycodone/ APAP 10/325) cardiomyopathy EF40% (lisinopril, Coreg), Mood disorder (trazodone and wellbutrin) who was admitted in 11/2016 w/ respiratory failure and SIRS. She presents w/ Obtundation, altered mentation and respiratory failure w/ hypoxemia somewhat improved w/ narcan. She was monitored in ICU, remained stable and was transferred to medical floor where she has steadily improved and is now at baseline. She has a motorized device at home for mobility and is looked after by her son. Patient has chronic skin picking and will be prescribed topical bactroban to prevent infection/. HH/ PT has been ordered    Therefore, she is discharged in fair and stable condition with close outpatient follow-up.    SPECIFIC OUTPATIENT FOLLOW-UP  Primary care    DISCHARGE PROBLEM LIST  Active Problems:    ARF (acute renal failure) (CMS-HCC) POA: Yes    Acute encephalopathy - 2/2 drug OD and hypoxemia POA: Yes    Acute respiratory failure with hypoxia and hypercarbia (CMS-HCC) POA: Yes    Coagulopathy (CMS-HCC) POA: Yes    Opioid dependence (CMS-HCC) POA: Yes    Starvation ketoacidosis POA: Yes    Metabolic acidosis POA: Yes    Emphysema lung (CMS-HCC) POA: Yes    Severe protein-calorie malnutrition (CMS-HCC) POA: Unknown  Resolved Problems:    * No resolved hospital problems. *      FOLLOW UP  Future Appointments  Date Time Provider Department Center   3/15/2017 3:20 PM Julissa Reeves M.D. [facility]     No follow-up provider specified.    MEDICATIONS ON DISCHARGE   Dian Mendoza   Home Medication Instructions GOVIND:[id number]    Printed on:02/13/17 6377    Medication Information                      albuterol (PROAIR HFA) 108 (90 BASE) MCG/ACT Aero Soln inhalation aerosol  Inhale 2 Puffs by mouth every 6 hours as needed for Shortness of Breath.             buPROPion SR (WELLBUTRIN-SR) 100 MG TABLET SR 12 HR  Take 2 Tabs by mouth 2 Times a Day.             carvedilol (COREG) 12.5 MG Tab  Take 1 Tab by mouth 2 times a day, with meals.             docusate sodium (COLACE) 100 MG CAPS  Take 200 mg by mouth 2 times a day.             levothyroxine (SYNTHROID) 125 MCG Tab  Take 1 Tab by mouth Every morning on an empty stomach.             lisinopril (PRINIVIL) 5 MG Tab  Take 5 mg by mouth every day.             morphine ER (MS CONTIN) 15 MG Tab CR tablet  Take 15 mg by mouth every 12 hours.             mupirocin (BACTROBAN) 2 % Ointment  Apply 1 Inch to affected area(s) every day.             ondansetron (ZOFRAN ODT) 4 MG TABLET DISPERSIBLE  Take 4 mg by mouth every 8 hours as needed for Nausea/Vomiting.             oxycodone-acetaminophen (PERCOCET)  MG Tab  Take 1 Tab by mouth every 6 hours as needed.             sennosides (SENOKOT) 8.6 MG TABS  Take 8.6 mg by mouth every day. Indications: Constipation, **OTC**             tolterodine ER (DETROL LA) 4 MG CAPSULE SR 24 HR  Take 1 Cap by mouth every day.             trazodone (DESYREL) 100 MG Tab  Take 2 Tabs by mouth every bedtime.             warfarin (COUMADIN) 3 MG Tab  Take 2 Tabs by mouth every bedtime.                 DIET  Orders Placed This Encounter   Procedures   • Diet Order     Standing Status: Standing      Number of Occurrences: 1      Standing Expiration Date:      Order Specific Question:  Diet:     Answer:  Regular [1]       ACTIVITY  As tolerated.      CONSULTATIONS  Pulmonary Critical Care- Gayatri    PROCEDURES  none    LABORATORY  Lab Results   Component Value Date/Time    SODIUM 146* 02/13/2017 02:51 AM    POTASSIUM 3.9 02/13/2017 02:51 AM    CHLORIDE 121* 02/13/2017 02:51 AM    CO2 22  02/13/2017 02:51 AM    GLUCOSE 98 02/13/2017 02:51 AM    BUN 18 02/13/2017 02:51 AM    CREATININE 0.85 02/13/2017 02:51 AM        Lab Results   Component Value Date/Time    WBC 9.9 02/11/2017 04:01 AM    HEMOGLOBIN 8.9* 02/11/2017 04:01 AM    HEMATOCRIT 31.9* 02/11/2017 04:01 AM    PLATELET COUNT 258 02/11/2017 04:01 AM        Total time of the discharge process exceeds 36 minutes.

## 2017-02-13 NOTE — PROGRESS NOTES
Received report, assumed care of pt at 1900. Pt in bed, fall precautions in place, bed alarm on, call light within reach. Pt instructed to use call light when assistance is needed. RN and CNA numbers provided. VSS. A&O x4. Hourly rounding in place, will continue to monitor pt closely.

## 2017-02-13 NOTE — THERAPY
Spoke with RN as well as Hospitalist RN.  Pt does not require acute skilled PT.  Pt has been up and ambulating and is anticipated to DC today.  No acute PT needs at this time.  PT assessment therefore not performed.

## 2017-02-13 NOTE — CARE PLAN
Problem: Mobility  Goal: Risk for activity intolerance will decrease  Pt is 1 person assist to BR, steady gait though has some weakness.     Problem: Pain Management  Goal: Pain level will decrease to patient’s comfort goal  Pt c/o pain in back, states it is chronic r/t previous surgeries. Prn tylenol available and given as well as hot pack. Pt sleeping calmly.

## 2017-02-13 NOTE — FACE TO FACE
Face to Face Supporting Documentation - Home Health    The encounter with this patient was in whole or in part the primary reason for home health admission.    Date of encounter:   Patient:                    MRN:                       YOB: 2017  Dian Mendoza  [id number]  1945     Home health to see patient for:  Skilled Nursing care for assessment, interventions & education, Physical Therapy evaluation and treatment and Occupational therapy evaluation and treatment    Skilled need for:  Exacerbation of Chronic Disease State chronic pain, debility    Skilled nursing interventions to include:  Wound Care    Homebound status evidenced by:  Need the aid of supportive devices such as crutches, canes, wheelchairs or walkers or Needs the assistance of another person in order to leave the home. Leaving home requires a considerable and taxing effort. There is a normal inability to leave the home.    Community Physician to provide follow up care: Julissa Reeves M.D.     Optional Interventions? No      I certify the face to face encounter for this home health care referral meets the CMS requirements and the encounter/clinical assessment with the patient was, in whole, or in part, for the medical condition(s) listed above, which is the primary reason for home health care. Based on my clinical findings: the service(s) are medically necessary, support the need for home health care, and the homebound criteria are met.  I certify that this patient has had a face to face encounter by myself.  Julieta Mayen M.D. - NPI: [id number]

## 2017-02-13 NOTE — PROGRESS NOTES
Received pt. On bed awake with A&Ox4, denies any pain/discomfort at the time of assx. Pt. Able to make her needs known. Up with 1 person assist to the bathroom, denies any recollection of drug OD. On RA sats at 97%. Call light placed within reach, due meds given. Needs attended.

## 2017-02-13 NOTE — DISCHARGE PLANNING
Received notification via in-basket , referral has been accepted by [facility]. THIERRY Quijano notified via voicemail.

## 2017-02-13 NOTE — CARE PLAN
Problem: Safety  Goal: Will remain free from injury  Outcome: PROGRESSING AS EXPECTED  Fall prec in place. Call light within reach. Bed alarm is on.     Problem: Knowledge Deficit  Goal: Knowledge of disease process/condition, treatment plan, diagnostic tests, and medications will improve  Outcome: PROGRESSING AS EXPECTED  POC for today discussed with the pt. Answered pt. Questions regarding care/tx.

## 2017-02-13 NOTE — DISCHARGE PLANNING
Medical Social Work  Referral: Choice for [facility]  Intervention: patient selected [facility], provided cab voucher, [phone] along with information for [facility], faxed choice to [facility].    Plan: SW will follow up as needed.

## 2017-02-13 NOTE — PROGRESS NOTES
Inpatient Anticoagulation Service Note    Date: 2/13/2017  Reason for Anticoagulation: Deep Vein Thrombosis, Pulmonary Embolism, Other (Comments) (protein C/S deficiency)        Hemoglobin Value: 8.9  Hematocrit Value: 31.9  Lab Platelet Value: 258  Target INR: 2.0 to 3.0    INR from last 7 days     Date/Time INR Value    02/13/17 0251 (!)1.62    02/12/17 0338 (!)4.59    02/11/17 0401 (!)5.6    02/10/17 1312 (!)4.81    02/10/17 0025 (!)3.44        Dose from last 7 days     Date/Time Dose (mg)    02/13/17 1500 6    02/12/17 1500 0    02/11/17 1100 0    02/10/17 1312 0    02/10/17 1300 --        Average Dose (mg):  (6 mg daily)  Significant Interactions: Thyroid Medications     Comments: INR dropped significantly overnight and is now sub therapeutic. No new CBC today. Will restart patient's home dosing today    Plan:  Coumadin 6mg po daily. Trend INRs  Education Material Provided?: No (Chronic warfarin therapy)  Pharmacist suggested discharge dosing: Home dosing of 6mg po daily with a follow up INR within 72 hours of discharge     Brenda Del Angel, PharmD

## 2017-02-13 NOTE — THERAPY
"Occupational Therapy Evaluation not appropriate    Patient received getting dressed standing EOB with RN reviewing DC information. RN report patient to receive home services and leaving building asap. OT eval canceled 2/2 no acute skilled OT needs at this time.       See \"Rehab Therapy-Acute\" Patient Summary Report for complete documentation.    "

## 2017-02-13 NOTE — DISCHARGE INSTRUCTIONS
Discharge Instructions    Discharged to home by taxi with self. Discharged via wheelchair, hospital escort: Yes.  Special equipment needed: Not Applicable    Be sure to schedule a follow-up appointment with your primary care doctor or any specialists as instructed.     Discharge Plan:   Diet Plan: Discussed (Regular diet as tolerated)  Activity Level: Discussed (Activity as tolerated)  Confirmed Follow up Appointment: Patient to Call and Schedule Appointment  Confirmed Symptoms Management: Discussed  Medication Reconciliation Updated: Yes  Influenza Vaccine Indication: Not indicated: Previously immunized this influenza season and > 8 years of age    I understand that a diet low in cholesterol, fat, and sodium is recommended for good health. Unless I have been given specific instructions below for another diet, I accept this instruction as my diet prescription.   Other diet: Regular diet as tolerated    Special Instructions: None    · Is patient discharged on Warfarin / Coumadin?   Yes    You are receiving the drug warfarin. Please understand the importance of monitoring warfarin with scheduled PT/INR blood draws.  Follow-up with a call to your personal Doctor's office in 3 days to schedule a PT/INR. .    IMPORTANT: HOW TO USE THIS INFORMATION:  This is a summary and does NOT have all possible information about this product. This information does not assure that this product is safe, effective, or appropriate for you. This information is not individual medical advice and does not substitute for the advice of your health care professional. Always ask your health care professional for complete information about this product and your specific health needs.      WARFARIN - ORAL (WARF-uh-rin)      COMMON BRAND NAME(S): Coumadin      WARNING:  Warfarin can cause very serious (possibly fatal) bleeding. This is more likely to occur when you first start taking this medication or if you take too much warfarin. To decrease your  "risk for bleeding, your doctor or other health care provider will monitor you closely and check your lab results (INR test) to make sure you are not taking too much warfarin. Keep all medical and laboratory appointments. Tell your doctor right away if you notice any signs of serious bleeding. See also Side Effects section.      USES:  This medication is used to treat blood clots (such as in deep vein thrombosis-DVT or pulmonary embolus-PE) and/or to prevent new clots from forming in your body. Preventing harmful blood clots helps to reduce the risk of a stroke or heart attack. Conditions that increase your risk of developing blood clots include a certain type of irregular heart rhythm (atrial fibrillation), heart valve replacement, recent heart attack, and certain surgeries (such as hip/knee replacement). Warfarin is commonly called a \"blood thinner,\" but the more correct term is \"anticoagulant.\" It helps to keep blood flowing smoothly in your body by decreasing the amount of certain substances (clotting proteins) in your blood.      HOW TO USE:  Read the Medication Guide provided by your pharmacist before you start taking warfarin and each time you get a refill. If you have any questions, ask your doctor or pharmacist. Take this medication by mouth with or without food as directed by your doctor or other health care professional, usually once a day. It is very important to take it exactly as directed. Do not increase the dose, take it more frequently, or stop using it unless directed by your doctor. Dosage is based on your medical condition, laboratory tests (such as INR), and response to treatment. Your doctor or other health care provider will monitor you closely while you are taking this medication to determine the right dose for you. Use this medication regularly to get the most benefit from it. To help you remember, take it at the same time each day. It is important to eat a balanced, consistent diet while " taking warfarin. Some foods can affect how warfarin works in your body and may affect your treatment and dose. Avoid sudden large increases or decreases in your intake of foods high in vitamin K (such as broccoli, cauliflower, cabbage, brussels sprouts, kale, spinach, and other green leafy vegetables, liver, green tea, certain vitamin supplements). If you are trying to lose weight, check with your doctor before you try to go on a diet. Cranberry products may also affect how your warfarin works. Limit the amount of cranberry juice (16 ounces/480 milliliters a day) or other cranberry products you may drink or eat.      SIDE EFFECTS:  Nausea, loss of appetite, or stomach/abdominal pain may occur. If any of these effects persist or worsen, tell your doctor or pharmacist promptly. Remember that your doctor has prescribed this medication because he or she has judged that the benefit to you is greater than the risk of side effects. Many people using this medication do not have serious side effects. This medication can cause serious bleeding if it affects your blood clotting proteins too much (shown by unusually high INR lab results). Even if your doctor stops your medication, this risk of bleeding can continue for up to a week. Tell your doctor right away if you have any signs of serious bleeding, including: unusual pain/swelling/discomfort, unusual/easy bruising, prolonged bleeding from cuts or gums, persistent/frequent nosebleeds, unusually heavy/prolonged menstrual flow, pink/dark urine, coughing up blood, vomit that is bloody or looks like coffee grounds, severe headache, dizziness/fainting, unusual or persistent tiredness/weakness, bloody/black/tarry stools, chest pain, shortness of breath, difficulty swallowing. Tell your doctor right away if any of these unlikely but serious side effects occur: persistent nausea/vomiting, severe stomach/abdominal pain, yellowing eyes/skin. This drug rarely has caused very serious  (possibly fatal) problems if its effects lead to small blood clots (usually at the beginning of treatment). This can lead to severe skin/tissue damage that may require surgery or amputation if left untreated. Patients with certain blood conditions (protein C or S deficiency) may be at greater risk. Get medical help right away if any of these rare but serious side effects occur: painful/red/purplish patches on the skin (such as on the toe, breast, abdomen), change in the amount of urine, vision changes, confusion, slurred speech, weakness on one side of the body. A very serious allergic reaction to this drug is rare. However, get medical help right away if you notice any symptoms of a serious allergic reaction, including: rash, itching/swelling (especially of the face/tongue/throat), severe dizziness, trouble breathing. This is not a complete list of possible side effects. If you notice other effects not listed above, contact your doctor or pharmacist. In the [place] - Call your doctor for medical advice about side effects. You may report side effects to FDA at 5-585-SUZ-0112. In [place] - Call your doctor for medical advice about side effects. You may report side effects to [facility] [place] at [phone].      PRECAUTIONS:  Before taking warfarin, tell your doctor or pharmacist if you are allergic to it; or if you have any other allergies. This product may contain inactive ingredients, which can cause allergic reactions or other problems. Talk to your pharmacist for more details. Before using this medication, tell your doctor or pharmacist your medical history, especially of: blood disorders (such as anemia, hemophilia), bleeding problems (such as bleeding of the stomach/intestines, bleeding in the brain), blood vessel disorders (such as aneurysms), recent major injury/surgery, liver disease, alcohol use, mental/mood disorders (including memory problems), frequent falls/injuries. It is important that all your doctors and  dentists know that you take warfarin. Before having surgery or any medical/dental procedures, tell your doctor or dentist that you are taking this medication and about all the products you use (including prescription drugs, nonprescription drugs, and herbal products). Avoid getting injections into the muscles. If you must have an injection into a muscle (for example, a flu shot), it should be given in the arm. This way, it will be easier to check for bleeding and/or apply pressure bandages. This medication may cause stomach bleeding. Daily use of alcohol while using this medicine will increase your risk for stomach bleeding and may also affect how this medication works. Limit or avoid alcoholic beverages. If you have not been eating well, if you have an illness or infection that causes fever, vomiting, or diarrhea for more than 2 days, or if you start using any antibiotic medications, contact your doctor or pharmacist immediately because these conditions can affect how warfarin works. This medication can cause heavy bleeding. To lower the chance of getting cut, bruised, or injured, use great caution with sharp objects like safety razors and nail cutters. Use an electric razor when shaving and a soft toothbrush when brushing your teeth. Avoid activities such as contact sports. If you fall or injure yourself, especially if you hit your head, call your doctor immediately. Your doctor may need to check you. The Food & Drug Administration has stated that generic warfarin products are interchangeable. However, consult your doctor or pharmacist before switching warfarin products. Be careful not to take more than one medication that contains warfarin unless specifically directed by the doctor or health care provider who is monitoring your warfarin treatment. Older adults may be at greater risk for bleeding while using this drug. This medication is not recommended for use during pregnancy because of serious (possibly fatal)  "harm to an unborn baby. Discuss the use of reliable forms of birth control with your doctor. If you become pregnant or think you may be pregnant, tell your doctor immediately. If you are planning pregnancy, discuss a plan for managing your condition with your doctor before you become pregnant. Your doctor may switch the type of medication you use during pregnancy. Very small amounts of this medication may pass into breast milk but is unlikely to harm a nursing infant. Consult your doctor before breast-feeding.      DRUG INTERACTIONS:  Drug interactions may change how your medications work or increase your risk for serious side effects. This document does not contain all possible drug interactions. Keep a list of all the products you use (including prescription/nonprescription drugs and herbal products) and share it with your doctor and pharmacist. Do not start, stop, or change the dosage of any medicines without your doctor's approval. Warfarin interacts with many prescription, nonprescription, vitamin, and herbal products. This includes medications that are applied to the skin or inside the vagina or rectum. The interactions with warfarin usually result in an increase or decrease in the \"blood-thinning\" (anticoagulant) effect. Your doctor or other health care professional should closely monitor you to prevent serious bleeding or clotting problems. While taking warfarin, it is very important to tell your doctor or pharmacist of any changes in medications, vitamins, or herbal products that you are taking. Some products that may interact with this drug include: capecitabine, imatinib, mifepristone. Aspirin, aspirin-like drugs (salicylates), and nonsteroidal anti-inflammatory drugs (NSAIDs such as ibuprofen, naproxen, celecoxib) may have effects similar to warfarin. These drugs may increase the risk of bleeding problems if taken during treatment with warfarin. Carefully check all prescription/nonprescription product " labels (including drugs applied to the skin such as pain-relieving creams) since the products may contain NSAIDs or salicylates. Talk to your doctor about using a different medication (such as acetaminophen) to treat pain/fever. Low-dose aspirin and related drugs (such as clopidogrel, ticlopidine) should be continued if prescribed by your doctor for specific medical reasons such as heart attack or stroke prevention. Consult your doctor or pharmacist for more details. Many herbal products interact with warfarin. Tell your doctor before taking any herbal products, especially bromelains, coenzyme Q10, cranberry, danshen, dong quai, fenugreek, garlic, ginkgo biloba, ginseng, and Braxton's wort, among others. This medication may interfere with a certain laboratory test to measure theophylline levels, possibly causing false test results. Make sure laboratory personnel and all your doctors know you use this drug.      OVERDOSE:  If overdose is suspected, contact a poison control center or emergency room immediately.  residents can call the [place] National Poison Hotline at 1-883.219.5849. [place] residents can call a provincial poison control center. Symptoms of overdose may include: bloody/black/tarry stools, pink/dark urine, unusual/prolonged bleeding.      NOTES:  Do not share this medication with others. Laboratory and/or medical tests (such as INR, complete blood count) must be performed periodically to monitor your progress or check for side effects. Consult your doctor for more details.      MISSED DOSE:  For the best possible benefit, do not miss any doses. If you do miss a dose and remember on the same day, take it as soon as you remember. If you remember on the next day, skip the missed dose and resume your usual dosing schedule. Do not double the dose to catch up because this could increase your risk for bleeding. Keep a record of missed doses to give to your doctor or pharmacist. Contact your doctor or pharmacist  if you miss 2 or more doses in a row.      STORAGE:  Store at room temperature away from light and moisture. Do not store in the bathroom. Keep all medications away from children and pets. Do not flush medications down the toilet or pour them into a drain unless instructed to do so. Properly discard this product when it is  or no longer needed. Consult your pharmacist or local waste disposal company for more details about how to safely discard your product.      MEDICAL ALERT:  Your condition and medication can cause complications in a medical emergency. For information about enrolling in MedicAlert, call 1-149.289.8081 ([place]) or 1-659.741.1638 ([place]).      Information last revised 2010 Copyright(c) 2010 [employer]             · Is patient Post Blood Transfusion?  No        Depression / Suicide Risk    As you are discharged from this [facility], it is important to learn how to keep safe from harming yourself.    Recognize the warning signs:  · Abrupt changes in personality, positive or negative- including increase in energy   · Giving away possessions  · Change in eating patterns- significant weight changes-  positive or negative  · Change in sleeping patterns- unable to sleep or sleeping all the time   · Unwillingness or inability to communicate  · Depression  · Unusual sadness, discouragement and loneliness  · Talk of wanting to die  · Neglect of personal appearance   · Rebelliousness- reckless behavior  · Withdrawal from people/activities they love  · Confusion- inability to concentrate     If you or a loved one observes any of these behaviors or has concerns about self-harm, here's what you can do:  · Talk about it- your feelings and reasons for harming yourself  · Remove any means that you might use to hurt yourself (examples: pills, rope, extension cords, firearm)  · Get professional help from the community (Mental Health, Substance Abuse, psychological counseling)  · Do  not be alone:Call your Safe Contact- someone whom you trust who will be there for you.  · Call your local CRISIS HOTLINE 651-1408 or 534-891-4570  · Call your local Children's Mobile Crisis Response Team [place] (229) 341-4439 or www.kalidea  · Call the toll free National Suicide Prevention Hotlines   · National Suicide Prevention Lifeline 767-863-EBXX (1412)  National Hope Line Network 800-SUICIDE (034-1259)          Depression, Adult  Depression refers to feeling sad, low, down in the dumps, blue, gloomy, or empty. In general, there are two kinds of depression:  1. Normal sadness or normal grief. This kind of depression is one that we all feel from time to time after upsetting life experiences, such as the loss of a job or the ending of a relationship. This kind of depression is considered normal, is short lived, and resolves within a few days to 2 weeks. Depression experienced after the loss of a loved one (bereavement) often lasts longer than 2 weeks but normally gets better with time.  2. Clinical depression. This kind of depression lasts longer than normal sadness or normal grief or interferes with your ability to function at home, at work, and in school. It also interferes with your personal relationships. It affects almost every aspect of your life. Clinical depression is an illness.  Symptoms of depression can also be caused by conditions other than those mentioned above, such as:  · Physical illness. Some physical illnesses, including underactive thyroid gland (hypothyroidism), severe anemia, specific types of cancer, diabetes, uncontrolled seizures, heart and lung problems, strokes, and chronic pain are commonly associated with symptoms of depression.  · Side effects of some prescription medicine. In some people, certain types of medicine can cause symptoms of depression.  · Substance abuse. Abuse of alcohol and illicit drugs can cause symptoms of depression.  SYMPTOMS  Symptoms of normal  sadness and normal grief include the following:  · Feeling sad or crying for short periods of time.  · Not caring about anything (apathy).  · Difficulty sleeping or sleeping too much.  · No longer able to enjoy the things you used to enjoy.  · Desire to be by oneself all the time (social isolation).  · Lack of energy or motivation.  · Difficulty concentrating or remembering.  · Change in appetite or weight.  · Restlessness or agitation.  Symptoms of clinical depression include the same symptoms of normal sadness or normal grief and also the following symptoms:  · Feeling sad or crying all the time.  · Feelings of guilt or worthlessness.  · Feelings of hopelessness or helplessness.  · Thoughts of suicide or the desire to harm yourself (suicidal ideation).  · Loss of touch with reality (psychotic symptoms). Seeing or hearing things that are not real (hallucinations) or having false beliefs about your life or the people around you (delusions and paranoia).  DIAGNOSIS   The diagnosis of clinical depression is usually based on how bad the symptoms are and how long they have lasted. Your health care provider will also ask you questions about your medical history and substance use to find out if physical illness, use of prescription medicine, or substance abuse is causing your depression. Your health care provider may also order blood tests.  TREATMENT   Often, normal sadness and normal grief do not require treatment. However, sometimes antidepressant medicine is given for bereavement to ease the depressive symptoms until they resolve.  The treatment for clinical depression depends on how bad the symptoms are but often includes antidepressant medicine, counseling with a mental health professional, or both. Your health care provider will help to determine what treatment is best for you.  Depression caused by physical illness usually goes away with appropriate medical treatment of the illness. If prescription medicine is  causing depression, talk with your health care provider about stopping the medicine, decreasing the dose, or changing to another medicine.  Depression caused by the abuse of alcohol or illicit drugs goes away when you stop using these substances. Some adults need professional help in order to stop drinking or using drugs.  SEEK IMMEDIATE MEDICAL CARE IF:  · You have thoughts about hurting yourself or others.  · You lose touch with reality (have psychotic symptoms).  · You are taking medicine for depression and have a serious side effect.  FOR MORE INFORMATION  · National [place] on Mental Illness: www.nestor.org   · National [place] of Mental Health: www.nimh.nih.gov      This information is not intended to replace advice given to you by your health care provider. Make sure you discuss any questions you have with your health care provider.     Document Released: 12/15/2001 Document Revised: 01/08/2016 Document Reviewed: 03/18/2013  [employer] Interactive Patient Education ©2016 [employer]

## 2017-02-13 NOTE — PROGRESS NOTES
Pt had one episode of vomiting following medication administration. Prn zofran given, pt now resting calmly with eyes closed, respirations regular.

## 2017-02-14 ENCOUNTER — HOME CARE VISIT (OUTPATIENT)
Dept: HOME HEALTH SERVICES | Facility: HOME HEALTHCARE | Age: 72
End: 2017-02-14
Payer: MEDICARE

## 2017-02-14 ENCOUNTER — PATIENT OUTREACH (OUTPATIENT)
Dept: HEALTH INFORMATION MANAGEMENT | Facility: OTHER | Age: 72
End: 2017-02-14

## 2017-02-14 VITALS
BODY MASS INDEX: 20.44 KG/M2 | RESPIRATION RATE: 14 BRPM | DIASTOLIC BLOOD PRESSURE: 64 MMHG | WEIGHT: 138 LBS | HEART RATE: 71 BPM | HEIGHT: 69 IN | TEMPERATURE: 98.6 F | SYSTOLIC BLOOD PRESSURE: 118 MMHG

## 2017-02-14 PROCEDURE — 665999 HH PPS REVENUE DEBIT

## 2017-02-14 PROCEDURE — G0162 HHC RN E&M PLAN SVS, 15 MIN: HCPCS

## 2017-02-14 PROCEDURE — 665998 HH PPS REVENUE CREDIT

## 2017-02-14 PROCEDURE — 665001 SOC-HOME HEALTH

## 2017-02-14 SDOH — ECONOMIC STABILITY: HOUSING INSECURITY: UNSAFE COOKING RANGE AREA: 0

## 2017-02-14 SDOH — ECONOMIC STABILITY: HOUSING INSECURITY: UNSAFE APPLIANCES: 0

## 2017-02-14 ASSESSMENT — ACTIVITIES OF DAILY LIVING (ADL)
HOME_HEALTH_OASIS: 02
OASIS_M1830: 03
HOME_HEALTH_OASIS: 01

## 2017-02-14 ASSESSMENT — ENCOUNTER SYMPTOMS
SEVERE DYSPNEA: 1
SHORTNESS OF BREATH: T
DEPRESSED MOOD: 1

## 2017-02-14 ASSESSMENT — PATIENT HEALTH QUESTIONNAIRE - PHQ9
2. FEELING DOWN, DEPRESSED, IRRITABLE, OR HOPELESS: 01
1. LITTLE INTEREST OR PLEASURE IN DOING THINGS: 00

## 2017-02-14 NOTE — PROGRESS NOTES
· Chart reviewed.  Patient was discharged 2/13/17 from [facility] with home health services through [facility].  Patient does not qualify for discharge outreach phone call.

## 2017-02-15 ENCOUNTER — HOME CARE VISIT (OUTPATIENT)
Dept: HOME HEALTH SERVICES | Facility: HOME HEALTHCARE | Age: 72
End: 2017-02-15
Payer: MEDICARE

## 2017-02-15 LAB
BACTERIA BLD CULT: NORMAL
BACTERIA BLD CULT: NORMAL
EKG IMPRESSION: NORMAL
SIGNIFICANT IND 70042: NORMAL
SIGNIFICANT IND 70042: NORMAL
SITE SITE: NORMAL
SITE SITE: NORMAL
SOURCE SOURCE: NORMAL
SOURCE SOURCE: NORMAL

## 2017-02-15 PROCEDURE — 665999 HH PPS REVENUE DEBIT

## 2017-02-15 PROCEDURE — 665998 HH PPS REVENUE CREDIT

## 2017-02-16 PROCEDURE — 665998 HH PPS REVENUE CREDIT

## 2017-02-16 PROCEDURE — 665999 HH PPS REVENUE DEBIT

## 2017-02-17 ENCOUNTER — HOME CARE VISIT (OUTPATIENT)
Dept: HOME HEALTH SERVICES | Facility: HOME HEALTHCARE | Age: 72
End: 2017-02-17
Payer: MEDICARE

## 2017-02-17 PROCEDURE — G0151 HHCP-SERV OF PT,EA 15 MIN: HCPCS

## 2017-02-17 PROCEDURE — 665999 HH PPS REVENUE DEBIT

## 2017-02-17 PROCEDURE — 665998 HH PPS REVENUE CREDIT

## 2017-02-18 PROCEDURE — 665999 HH PPS REVENUE DEBIT

## 2017-02-18 PROCEDURE — 665998 HH PPS REVENUE CREDIT

## 2017-02-19 PROCEDURE — 665998 HH PPS REVENUE CREDIT

## 2017-02-19 PROCEDURE — 665999 HH PPS REVENUE DEBIT

## 2017-02-20 ENCOUNTER — HOME CARE VISIT (OUTPATIENT)
Dept: HOME HEALTH SERVICES | Facility: HOME HEALTHCARE | Age: 72
End: 2017-02-20
Payer: MEDICARE

## 2017-02-20 VITALS
SYSTOLIC BLOOD PRESSURE: 138 MMHG | RESPIRATION RATE: 18 BRPM | TEMPERATURE: 100.3 F | HEART RATE: 63 BPM | DIASTOLIC BLOOD PRESSURE: 78 MMHG

## 2017-02-20 PROCEDURE — 665999 HH PPS REVENUE DEBIT

## 2017-02-20 PROCEDURE — 665998 HH PPS REVENUE CREDIT

## 2017-02-20 ASSESSMENT — ACTIVITIES OF DAILY LIVING (ADL)
IADLS_COMMENTS: <!--EPICS-->SEE OT EVALUATION<!--EPICE-->
ADLS_COMMENTS: <!--EPICS-->SEE OT EVALUATION<!--EPICE-->

## 2017-02-21 ENCOUNTER — HOME CARE VISIT (OUTPATIENT)
Dept: HOME HEALTH SERVICES | Facility: HOME HEALTHCARE | Age: 72
End: 2017-02-21
Payer: MEDICARE

## 2017-02-21 PROCEDURE — 665998 HH PPS REVENUE CREDIT

## 2017-02-21 PROCEDURE — 665999 HH PPS REVENUE DEBIT

## 2017-02-22 PROCEDURE — 665998 HH PPS REVENUE CREDIT

## 2017-02-22 PROCEDURE — 665999 HH PPS REVENUE DEBIT

## 2017-02-23 ENCOUNTER — HOME CARE VISIT (OUTPATIENT)
Dept: HOME HEALTH SERVICES | Facility: HOME HEALTHCARE | Age: 72
End: 2017-02-23
Payer: MEDICARE

## 2017-02-23 ENCOUNTER — ANTICOAGULATION MONITORING (OUTPATIENT)
Dept: VASCULAR LAB | Facility: MEDICAL CENTER | Age: 72
End: 2017-02-23

## 2017-02-23 DIAGNOSIS — D68.59 PROTEIN C DEFICIENCY (HCC): ICD-10-CM

## 2017-02-23 DIAGNOSIS — D68.59 PROTEIN S DEFICIENCY (HCC): ICD-10-CM

## 2017-02-23 DIAGNOSIS — Z79.01 CHRONIC ANTICOAGULATION: ICD-10-CM

## 2017-02-23 DIAGNOSIS — Z95.828 S/P IVC FILTER: ICD-10-CM

## 2017-02-23 LAB — INR PPP: 1.5 (ref 2–3.5)

## 2017-02-23 PROCEDURE — G0151 HHCP-SERV OF PT,EA 15 MIN: HCPCS

## 2017-02-23 PROCEDURE — G0152 HHCP-SERV OF OT,EA 15 MIN: HCPCS

## 2017-02-23 PROCEDURE — 665998 HH PPS REVENUE CREDIT

## 2017-02-23 PROCEDURE — 665999 HH PPS REVENUE DEBIT

## 2017-02-23 PROCEDURE — G0299 HHS/HOSPICE OF RN EA 15 MIN: HCPCS

## 2017-02-24 VITALS — TEMPERATURE: 100.3 F | DIASTOLIC BLOOD PRESSURE: 68 MMHG | SYSTOLIC BLOOD PRESSURE: 128 MMHG | HEART RATE: 81 BPM

## 2017-02-24 PROCEDURE — 665999 HH PPS REVENUE DEBIT

## 2017-02-24 PROCEDURE — 665998 HH PPS REVENUE CREDIT

## 2017-02-24 ASSESSMENT — ENCOUNTER SYMPTOMS
DEPRESSED MOOD: 1
SEVERE DYSPNEA: 1
DEBILITATING PAIN: 1

## 2017-02-24 NOTE — PROGRESS NOTES
Anticoagulation Summary as of 2/23/2017     INR goal 2.0-3.0   Selected INR 1.5! (2/23/2017)   Maintenance plan 6 mg (3 mg x 2) every day   Weekly total 42 mg   Plan last modified Quincy Osorio PHARMD (8/3/2016)   Next INR check 2/28/2017   Target end date Indefinite    Indications   Chronic anticoagulation [Z79.01]  Protein C deficiency (CMS-HCC) [D68.59]  Protein S deficiency (CMS-HCC) [D68.59]  Deep vein thrombosis (DVT) (CMS-HCC) [I82.409]  S/P IVC filter [Z95.828]         Anticoagulation Episode Summary     INR check location     Preferred lab     Send INR reminders to     Comments       Anticoagulation Care Providers     Provider Role Specialty Phone number    Julissa Reeves M.D. Referring Family Medicine [phone]    Quincy Osorio PHARMD Responsible          Anticoagulation Patient Findings   Positives Hospitalization    Negatives Missed Doses, Extra Doses, Medication Changes, Antibiotic Use, Diet Changes, Dental/Other Procedures, Bleeding Gums, Nose Bleeds, Blood in Urine, Blood in Stool, Any Bruising, Other Complaints    Comments Acute narcotic overdose        Spoke with patient today regarding subtherapeutic INR of 1.5.  Patient denies any signs/symptoms of bruising or bleeding or any changes in diet and medications.  Instructed patient to call clinic with any questions or concerns.  Instructed patient to bolus with 9mg X 2, then resume current warfarin regimen.  Follow up in 5 days.    Quincy Osorio PHARMD

## 2017-02-25 PROCEDURE — 665998 HH PPS REVENUE CREDIT

## 2017-02-25 PROCEDURE — 665999 HH PPS REVENUE DEBIT

## 2017-02-26 VITALS
TEMPERATURE: 100.3 F | RESPIRATION RATE: 18 BRPM | WEIGHT: 134 LBS | DIASTOLIC BLOOD PRESSURE: 68 MMHG | BODY MASS INDEX: 19.78 KG/M2 | SYSTOLIC BLOOD PRESSURE: 128 MMHG | HEART RATE: 89 BPM

## 2017-02-26 PROCEDURE — 665998 HH PPS REVENUE CREDIT

## 2017-02-26 PROCEDURE — 665999 HH PPS REVENUE DEBIT

## 2017-02-26 ASSESSMENT — ENCOUNTER SYMPTOMS
DEBILITATING PAIN: 1
THOUGHT CONTENT - SUSPICIOUS: 1
RESPIRATORY SYMPTOMS COMMENTS: NO S/S OF RESP DISTRESS
POOR JUDGMENT: 1

## 2017-02-27 ENCOUNTER — HOME CARE VISIT (OUTPATIENT)
Dept: HOME HEALTH SERVICES | Facility: HOME HEALTHCARE | Age: 72
End: 2017-02-27
Payer: MEDICARE

## 2017-02-27 VITALS
TEMPERATURE: 100.3 F | BODY MASS INDEX: 19.78 KG/M2 | RESPIRATION RATE: 18 BRPM | DIASTOLIC BLOOD PRESSURE: 68 MMHG | SYSTOLIC BLOOD PRESSURE: 128 MMHG | WEIGHT: 134 LBS | HEART RATE: 81 BPM

## 2017-02-27 PROCEDURE — G0180 MD CERTIFICATION HHA PATIENT: HCPCS | Performed by: FAMILY MEDICINE

## 2017-02-27 PROCEDURE — 665999 HH PPS REVENUE DEBIT

## 2017-02-27 PROCEDURE — 665998 HH PPS REVENUE CREDIT

## 2017-02-27 ASSESSMENT — ACTIVITIES OF DAILY LIVING (ADL)
DRESSING_LB_ASSISTANCE: 0
GROOMING_ASSISTANCE: 0
BATHING_ASSISTANCE: 0
ORAL_CARE_ASSISTANCE: 0
SHOPPING_ASSISTANCE: 6
DRESSING_UB_ASSISTANCE: 0
HOUSEKEEPING_ASSISTANCE: 6
TOILETING_EQUIPMENT_USED: HIGH RISE TOILET, GRAB BARS
TOILETING_ASSISTANCE: 0
TELEPHONE_ASSISTANCE: 0
MEAL_PREP_ASSISTANCE: 5
EATING_ASSISTANCE: 0
LAUNDRY_ASSISTANCE: 5
TRANSPORTATION_ASSISTANCE: 6

## 2017-02-28 ENCOUNTER — HOME CARE VISIT (OUTPATIENT)
Dept: HOME HEALTH SERVICES | Facility: HOME HEALTHCARE | Age: 72
End: 2017-02-28
Payer: MEDICARE

## 2017-02-28 ENCOUNTER — ANTICOAGULATION MONITORING (OUTPATIENT)
Dept: VASCULAR LAB | Facility: MEDICAL CENTER | Age: 72
End: 2017-02-28

## 2017-02-28 VITALS
SYSTOLIC BLOOD PRESSURE: 100 MMHG | TEMPERATURE: 99.5 F | BODY MASS INDEX: 19.63 KG/M2 | HEART RATE: 86 BPM | WEIGHT: 133 LBS | RESPIRATION RATE: 18 BRPM

## 2017-02-28 DIAGNOSIS — D68.59 PROTEIN S DEFICIENCY (HCC): ICD-10-CM

## 2017-02-28 DIAGNOSIS — Z79.01 CHRONIC ANTICOAGULATION: ICD-10-CM

## 2017-02-28 DIAGNOSIS — D68.59 PROTEIN C DEFICIENCY (HCC): ICD-10-CM

## 2017-02-28 DIAGNOSIS — Z95.828 S/P IVC FILTER: ICD-10-CM

## 2017-02-28 LAB — INR PPP: 2 (ref 2–3.5)

## 2017-02-28 PROCEDURE — 665998 HH PPS REVENUE CREDIT

## 2017-02-28 PROCEDURE — 665999 HH PPS REVENUE DEBIT

## 2017-02-28 PROCEDURE — G0299 HHS/HOSPICE OF RN EA 15 MIN: HCPCS

## 2017-02-28 PROCEDURE — G0151 HHCP-SERV OF PT,EA 15 MIN: HCPCS

## 2017-02-28 ASSESSMENT — ENCOUNTER SYMPTOMS
DEPRESSED MOOD: 1
THOUGHT CONTENT - SUSPICIOUS: 1
DEBILITATING PAIN: 1

## 2017-03-01 ENCOUNTER — ANTICOAGULATION MONITORING (OUTPATIENT)
Dept: VASCULAR LAB | Facility: MEDICAL CENTER | Age: 72
End: 2017-03-01

## 2017-03-01 VITALS
SYSTOLIC BLOOD PRESSURE: 100 MMHG | HEART RATE: 86 BPM | RESPIRATION RATE: 16 BRPM | DIASTOLIC BLOOD PRESSURE: 70 MMHG | TEMPERATURE: 211.1 F

## 2017-03-01 DIAGNOSIS — I82.401 ACUTE DEEP VEIN THROMBOSIS (DVT) OF RIGHT LOWER EXTREMITY, UNSPECIFIED VEIN (HCC): ICD-10-CM

## 2017-03-01 DIAGNOSIS — Z79.01 CHRONIC ANTICOAGULATION: ICD-10-CM

## 2017-03-01 DIAGNOSIS — Z95.828 S/P IVC FILTER: ICD-10-CM

## 2017-03-01 DIAGNOSIS — D68.59 PROTEIN S DEFICIENCY (HCC): ICD-10-CM

## 2017-03-01 DIAGNOSIS — D68.59 PROTEIN C DEFICIENCY (HCC): ICD-10-CM

## 2017-03-01 PROCEDURE — 665999 HH PPS REVENUE DEBIT

## 2017-03-01 PROCEDURE — 665998 HH PPS REVENUE CREDIT

## 2017-03-01 NOTE — PROGRESS NOTES
Anticoagulation Summary as of 2/28/2017     INR goal 2.0-3.0   Selected INR 2.0 (2/28/2017)   Maintenance plan 6 mg (3 mg x 2) every day   Weekly total 42 mg   Plan last modified Quincy Osorio PHARMD (8/3/2016)   Next INR check 3/3/2017   Target end date Indefinite    Indications   Chronic anticoagulation [Z79.01]  Protein C deficiency (CMS-HCC) [D68.59]  Protein S deficiency (CMS-HCC) [D68.59]  Deep vein thrombosis (DVT) (CMS-HCC) [I82.409]  S/P IVC filter [Z95.828]         Anticoagulation Episode Summary     INR check location     Preferred lab     Send INR reminders to     Comments       Anticoagulation Care Providers     Provider Role Specialty Phone number    Julissa Reeves M.D. [facility] [phone]    Quincy Osorio PHARMD Responsible          Anticoagulation Patient Findings   Negatives Missed Doses, Extra Doses, Medication Changes, Antibiotic Use, Diet Changes, Dental/Other Procedures, Hospitalization, Bleeding Gums, Nose Bleeds, Blood in Urine, Blood in Stool, Any Bruising, Other Complaints        Spoke with patient today regarding therapeutic INR of 2.0.  Patient denies any signs/symptoms of bruising or bleeding or any changes in diet and medications.  Instructed patient to call clinic with any questions or concerns.  Pt is to continue with current warfarin dosing regimen.  Follow up in 3 days.    Quincy Osorio PHARMD

## 2017-03-02 ENCOUNTER — HOME CARE VISIT (OUTPATIENT)
Dept: HOME HEALTH SERVICES | Facility: HOME HEALTHCARE | Age: 72
End: 2017-03-02
Payer: MEDICARE

## 2017-03-02 PROCEDURE — 665999 HH PPS REVENUE DEBIT

## 2017-03-02 PROCEDURE — G0155 HHCP-SVS OF CSW,EA 15 MIN: HCPCS

## 2017-03-02 PROCEDURE — G0151 HHCP-SERV OF PT,EA 15 MIN: HCPCS

## 2017-03-02 PROCEDURE — 665998 HH PPS REVENUE CREDIT

## 2017-03-02 ASSESSMENT — ENCOUNTER SYMPTOMS: RESPIRATORY SYMPTOMS COMMENTS: NO S/S OF RESP DISTRESS

## 2017-03-03 ENCOUNTER — HOME CARE VISIT (OUTPATIENT)
Dept: HOME HEALTH SERVICES | Facility: HOME HEALTHCARE | Age: 72
End: 2017-03-03
Payer: MEDICARE

## 2017-03-03 ENCOUNTER — ANTICOAGULATION MONITORING (OUTPATIENT)
Dept: VASCULAR LAB | Facility: MEDICAL CENTER | Age: 72
End: 2017-03-03

## 2017-03-03 ENCOUNTER — TELEPHONE (OUTPATIENT)
Dept: MEDICAL GROUP | Facility: CLINIC | Age: 72
End: 2017-03-03

## 2017-03-03 VITALS
SYSTOLIC BLOOD PRESSURE: 110 MMHG | TEMPERATURE: 100.5 F | HEART RATE: 86 BPM | DIASTOLIC BLOOD PRESSURE: 78 MMHG | RESPIRATION RATE: 18 BRPM

## 2017-03-03 VITALS
RESPIRATION RATE: 14 BRPM | DIASTOLIC BLOOD PRESSURE: 64 MMHG | TEMPERATURE: 210.9 F | SYSTOLIC BLOOD PRESSURE: 112 MMHG | HEART RATE: 85 BPM

## 2017-03-03 DIAGNOSIS — Z79.01 CHRONIC ANTICOAGULATION: ICD-10-CM

## 2017-03-03 DIAGNOSIS — D68.59 PROTEIN C DEFICIENCY (HCC): ICD-10-CM

## 2017-03-03 DIAGNOSIS — Z95.828 S/P IVC FILTER: ICD-10-CM

## 2017-03-03 DIAGNOSIS — D68.59 PROTEIN S DEFICIENCY (HCC): ICD-10-CM

## 2017-03-03 LAB — INR PPP: 1.7 (ref 2–3.5)

## 2017-03-03 PROCEDURE — 665999 HH PPS REVENUE DEBIT

## 2017-03-03 PROCEDURE — G0299 HHS/HOSPICE OF RN EA 15 MIN: HCPCS

## 2017-03-03 PROCEDURE — 665998 HH PPS REVENUE CREDIT

## 2017-03-03 ASSESSMENT — ENCOUNTER SYMPTOMS
NAUSEA: PT DENIES
VOMITING: PT DENIES

## 2017-03-03 NOTE — TELEPHONE ENCOUNTER
1. Caller Name: home health nurse                  Call Back Number:     2. Message: home health nurse called to call in INR results. INR is 1.7, I have asked nurse to also fax over results.    3. Patient approves office to leave a detailed voicemail/MyChart message: yes

## 2017-03-04 PROCEDURE — 665998 HH PPS REVENUE CREDIT

## 2017-03-04 PROCEDURE — 665999 HH PPS REVENUE DEBIT

## 2017-03-04 NOTE — TELEPHONE ENCOUNTER
She needs to increase to 9 mg (3 tablets) ONCE a week and stay on the 6 mg all remaining days.  Please advise her to recheck her pro time in 2 weeks.

## 2017-03-04 NOTE — PROGRESS NOTES
Anticoagulation Summary as of 3/3/2017     INR goal 2.0-3.0   Selected INR 1.7! (3/3/2017)   Maintenance plan 9 mg (3 mg x 3) on Mon; 6 mg (3 mg x 2) all other days   Weekly total 45 mg   Plan last modified Star Betts, PHARMD (3/3/2017)   Next INR check 3/9/2017   Target end date Indefinite    Indications   Chronic anticoagulation [Z79.01]  Protein C deficiency (CMS-[facility]) [D68.59]  Protein S deficiency (CMS-HCC) [D68.59]  Deep vein thrombosis (DVT) (CMS-HCC) [I82.409]  S/P IVC filter [Z95.828]         Anticoagulation Episode Summary     INR check location     Preferred lab     Send INR reminders to     Comments       Anticoagulation Care Providers     Provider Role Specialty Phone number    Julissa Reeves M.D. Referring Family Medicine [phone]    NORRIS CokerD Responsible          Anticoagulation Patient Findings    Left voicemail message to report a SUB therapeutic INR of 1.7.  Pt to bolus x 2 days then begin 7% increased warfarin dosing regimen. Requested pt contact the clinic for any s/s of unusual bleeding, bruising, clotting or any changes to diet or medication. FU 1 weeks.    Star Betts, PHARMD

## 2017-03-05 PROCEDURE — 665998 HH PPS REVENUE CREDIT

## 2017-03-05 PROCEDURE — 665999 HH PPS REVENUE DEBIT

## 2017-03-06 ENCOUNTER — HOME CARE VISIT (OUTPATIENT)
Dept: HOME HEALTH SERVICES | Facility: HOME HEALTHCARE | Age: 72
End: 2017-03-06
Payer: MEDICARE

## 2017-03-06 PROCEDURE — 665999 HH PPS REVENUE DEBIT

## 2017-03-06 PROCEDURE — 665998 HH PPS REVENUE CREDIT

## 2017-03-07 ENCOUNTER — HOME CARE VISIT (OUTPATIENT)
Dept: HOME HEALTH SERVICES | Facility: HOME HEALTHCARE | Age: 72
End: 2017-03-07
Payer: MEDICARE

## 2017-03-07 PROCEDURE — 665998 HH PPS REVENUE CREDIT

## 2017-03-07 PROCEDURE — 665999 HH PPS REVENUE DEBIT

## 2017-03-07 PROCEDURE — G0155 HHCP-SVS OF CSW,EA 15 MIN: HCPCS

## 2017-03-08 ENCOUNTER — TELEPHONE (OUTPATIENT)
Dept: MEDICAL GROUP | Facility: CLINIC | Age: 72
End: 2017-03-08

## 2017-03-08 ENCOUNTER — HOME CARE VISIT (OUTPATIENT)
Dept: HOME HEALTH SERVICES | Facility: HOME HEALTHCARE | Age: 72
End: 2017-03-08
Payer: MEDICARE

## 2017-03-08 ENCOUNTER — ANTICOAGULATION MONITORING (OUTPATIENT)
Dept: VASCULAR LAB | Facility: MEDICAL CENTER | Age: 72
End: 2017-03-08

## 2017-03-08 DIAGNOSIS — I82.409 DEEP VEIN THROMBOSIS (DVT) OF LOWER EXTREMITY, UNSPECIFIED CHRONICITY, UNSPECIFIED LATERALITY, UNSPECIFIED VEIN (HCC): ICD-10-CM

## 2017-03-08 DIAGNOSIS — D68.59 PROTEIN C DEFICIENCY (HCC): ICD-10-CM

## 2017-03-08 DIAGNOSIS — Z95.828 S/P IVC FILTER: ICD-10-CM

## 2017-03-08 DIAGNOSIS — D68.59 PROTEIN S DEFICIENCY (HCC): ICD-10-CM

## 2017-03-08 DIAGNOSIS — Z79.01 CHRONIC ANTICOAGULATION: ICD-10-CM

## 2017-03-08 LAB — INR PPP: 2.1 (ref 2–3.5)

## 2017-03-08 PROCEDURE — 665998 HH PPS REVENUE CREDIT

## 2017-03-08 PROCEDURE — G0495 RN CARE TRAIN/EDU IN HH: HCPCS

## 2017-03-08 PROCEDURE — 665999 HH PPS REVENUE DEBIT

## 2017-03-09 VITALS
HEART RATE: 90 BPM | TEMPERATURE: 98.1 F | SYSTOLIC BLOOD PRESSURE: 96 MMHG | DIASTOLIC BLOOD PRESSURE: 58 MMHG | RESPIRATION RATE: 16 BRPM

## 2017-03-09 PROCEDURE — 665998 HH PPS REVENUE CREDIT

## 2017-03-09 PROCEDURE — 665999 HH PPS REVENUE DEBIT

## 2017-03-09 ASSESSMENT — ENCOUNTER SYMPTOMS
VOMITING: PT DENIES
POOR JUDGMENT: 1
DEBILITATING PAIN: 1
NAUSEA: PT DENIES

## 2017-03-09 NOTE — PROGRESS NOTES
Anticoagulation Summary as of 3/8/2017     INR goal 2.0-3.0   Selected INR 2.1 (3/8/2017)   Maintenance plan 9 mg (3 mg x 3) on Mon, Fri; 6 mg (3 mg x 2) all other days   Weekly total 48 mg   Plan last modified Quincy Osorio PHARMD (3/8/2017)   Next INR check 3/15/2017   Target end date Indefinite    Indications   Chronic anticoagulation [Z79.01]  Protein C deficiency (CMS-[facility]) [D68.59]  Protein S deficiency (CMS-HCC) [D68.59]  Deep vein thrombosis (DVT) (CMS-HCC) [I82.409]  S/P IVC filter [Z95.828]         Anticoagulation Episode Summary     INR check location     Preferred lab     Send INR reminders to     Comments       Anticoagulation Care Providers     Provider Role Specialty Phone number    Julissa Reeves M.D. Referring Family Medicine [phone]    Quincy Osorio PHARMD Responsible          Anticoagulation Patient Findings   Negatives Missed Doses, Extra Doses, Medication Changes, Antibiotic Use, Diet Changes, Dental/Other Procedures, Hospitalization, Bleeding Gums, Nose Bleeds, Blood in Urine, Blood in Stool, Any Bruising, Other Complaints        Spoke with patient today regarding therapeutic INR of 2.1.  Patient denies any signs/symptoms of bruising or bleeding or any changes in diet and medications.  Instructed patient to call clinic with any questions or concerns.  Instructed patient to increase weekly warfarin regimen by ~7% as detailed above.  Follow up in 1 weeks.    Quincy Osorio PHARMD

## 2017-03-09 NOTE — TELEPHONE ENCOUNTER
1. Caller Name: [facility]                  Call Back Number: [phone]    2. Message: RN called to update you on patients INR. Today patients INR is 2.1    3. Patient approves office to leave a detailed voicemail/MyChart message: yes

## 2017-03-10 ENCOUNTER — HOME CARE VISIT (OUTPATIENT)
Dept: HOME HEALTH SERVICES | Facility: HOME HEALTHCARE | Age: 72
End: 2017-03-10
Payer: MEDICARE

## 2017-03-10 VITALS — HEART RATE: 84 BPM | RESPIRATION RATE: 18 BRPM | TEMPERATURE: 99.8 F

## 2017-03-10 PROCEDURE — 665998 HH PPS REVENUE CREDIT

## 2017-03-10 PROCEDURE — G0151 HHCP-SERV OF PT,EA 15 MIN: HCPCS

## 2017-03-10 PROCEDURE — 665999 HH PPS REVENUE DEBIT

## 2017-03-11 PROCEDURE — 665998 HH PPS REVENUE CREDIT

## 2017-03-11 PROCEDURE — 665999 HH PPS REVENUE DEBIT

## 2017-03-12 PROCEDURE — 665998 HH PPS REVENUE CREDIT

## 2017-03-12 PROCEDURE — 665999 HH PPS REVENUE DEBIT

## 2017-03-13 PROCEDURE — 665999 HH PPS REVENUE DEBIT

## 2017-03-13 PROCEDURE — 665998 HH PPS REVENUE CREDIT

## 2017-03-14 PROCEDURE — 665999 HH PPS REVENUE DEBIT

## 2017-03-14 PROCEDURE — 665998 HH PPS REVENUE CREDIT

## 2017-03-15 ENCOUNTER — OFFICE VISIT (OUTPATIENT)
Dept: MEDICAL GROUP | Facility: CLINIC | Age: 72
End: 2017-03-15
Payer: MEDICARE

## 2017-03-15 VITALS
HEIGHT: 69 IN | TEMPERATURE: 97.2 F | RESPIRATION RATE: 16 BRPM | SYSTOLIC BLOOD PRESSURE: 130 MMHG | DIASTOLIC BLOOD PRESSURE: 80 MMHG | WEIGHT: 133 LBS | HEART RATE: 100 BPM | OXYGEN SATURATION: 92 % | BODY MASS INDEX: 19.7 KG/M2

## 2017-03-15 DIAGNOSIS — R21 EXCORIATED RASH: ICD-10-CM

## 2017-03-15 DIAGNOSIS — E03.4 IDIOPATHIC ATROPHIC HYPOTHYROIDISM: ICD-10-CM

## 2017-03-15 DIAGNOSIS — I42.9 CARDIOMYOPATHY (HCC): ICD-10-CM

## 2017-03-15 DIAGNOSIS — Z79.01 CHRONIC ANTICOAGULATION: ICD-10-CM

## 2017-03-15 DIAGNOSIS — N18.30 CKD (CHRONIC KIDNEY DISEASE) STAGE 3, GFR 30-59 ML/MIN (HCC): ICD-10-CM

## 2017-03-15 DIAGNOSIS — N39.46 MIXED URGE AND STRESS INCONTINENCE: ICD-10-CM

## 2017-03-15 DIAGNOSIS — F41.8 DEPRESSION WITH ANXIETY: ICD-10-CM

## 2017-03-15 DIAGNOSIS — Z86.718 HISTORY OF DEEP VEIN THROMBOSIS: ICD-10-CM

## 2017-03-15 DIAGNOSIS — Z95.828 S/P IVC FILTER: ICD-10-CM

## 2017-03-15 PROCEDURE — G8432 DEP SCR NOT DOC, RNG: HCPCS | Performed by: FAMILY MEDICINE

## 2017-03-15 PROCEDURE — 3014F SCREEN MAMMO DOC REV: CPT | Performed by: FAMILY MEDICINE

## 2017-03-15 PROCEDURE — 1036F TOBACCO NON-USER: CPT | Performed by: FAMILY MEDICINE

## 2017-03-15 PROCEDURE — 4040F PNEUMOC VAC/ADMIN/RCVD: CPT | Performed by: FAMILY MEDICINE

## 2017-03-15 PROCEDURE — 99214 OFFICE O/P EST MOD 30 MIN: CPT | Performed by: FAMILY MEDICINE

## 2017-03-15 PROCEDURE — 665999 HH PPS REVENUE DEBIT

## 2017-03-15 PROCEDURE — G8419 CALC BMI OUT NRM PARAM NOF/U: HCPCS | Performed by: FAMILY MEDICINE

## 2017-03-15 PROCEDURE — 3017F COLORECTAL CA SCREEN DOC REV: CPT | Performed by: FAMILY MEDICINE

## 2017-03-15 PROCEDURE — G8482 FLU IMMUNIZE ORDER/ADMIN: HCPCS | Performed by: FAMILY MEDICINE

## 2017-03-15 PROCEDURE — G8598 ASA/ANTIPLAT THER USED: HCPCS | Performed by: FAMILY MEDICINE

## 2017-03-15 PROCEDURE — 665998 HH PPS REVENUE CREDIT

## 2017-03-15 PROCEDURE — 1101F PT FALLS ASSESS-DOCD LE1/YR: CPT | Performed by: FAMILY MEDICINE

## 2017-03-15 RX ORDER — BUPROPION HYDROCHLORIDE 100 MG/1
200 TABLET, EXTENDED RELEASE ORAL 2 TIMES DAILY
Qty: 120 TAB | Refills: 6 | Status: SHIPPED | OUTPATIENT
Start: 2017-03-15 | End: 2017-10-12 | Stop reason: SDUPTHER

## 2017-03-15 RX ORDER — TOLTERODINE 4 MG/1
4 CAPSULE, EXTENDED RELEASE ORAL DAILY
Qty: 30 CAP | Refills: 6 | Status: SHIPPED | OUTPATIENT
Start: 2017-03-15 | End: 2017-10-12 | Stop reason: SDUPTHER

## 2017-03-15 RX ORDER — AMMONIUM LACTATE 12 G/100G
LOTION TOPICAL
Qty: 1 BOTTLE | Refills: 3 | Status: SHIPPED | OUTPATIENT
Start: 2017-03-15 | End: 2017-06-16 | Stop reason: SDUPTHER

## 2017-03-15 RX ORDER — CEFUROXIME AXETIL 250 MG/1
250 TABLET ORAL 2 TIMES DAILY
Qty: 28 TAB | Refills: 0 | Status: SHIPPED | OUTPATIENT
Start: 2017-03-15 | End: 2017-03-29

## 2017-03-15 RX ORDER — CARVEDILOL 12.5 MG/1
12.5 TABLET ORAL 2 TIMES DAILY WITH MEALS
Qty: 60 TAB | Refills: 6 | Status: SHIPPED | OUTPATIENT
Start: 2017-03-15 | End: 2017-06-22

## 2017-03-15 RX ORDER — LEVOTHYROXINE SODIUM 0.12 MG/1
125 TABLET ORAL
Qty: 30 TAB | Refills: 6 | Status: SHIPPED | OUTPATIENT
Start: 2017-03-15 | End: 2017-10-12 | Stop reason: SDUPTHER

## 2017-03-15 NOTE — PROGRESS NOTES
CC: hospital follow up    HPI:   Dian presents today with the following.  There is a problem at home, controlling her own medications.  She is considering an assisted living situation.      1. 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 She has had no further clots. She continues on chronic anticoagulation with warfarin. She is trying to use the home pro time INR machine is unable to do so. She does have difficulty getting to the lab for blood draws. Her son who is her transportation leaves early morning and does not get back until late at night. She has difficulty negotiating arranging the access bus. Discussed that an assisted living situation would take care of a lot of these problems.    2. CKD (chronic kidney disease) stage 3, GFR 30-59 ml/min  This has been stable. She continues to be checked at least twice yearly.    3. Cardiomyopathy (CMS-[facility])  Her last echocardiogram was last August. It shows relatively stable global hypokinesis. She does have moderately elevated pulmonary artery pressures. This is stable overall. She continues her carvedilol, furosemide diuretic, lisinopril.    4. Excoriated rash  She points to a rash on her legs arms and upper back as evidence of parasites. She says there is stinging, itching and burning. It is notable that these rashes are solely on the from the legs, front of the arms and shoulders and upper back where she can reach. Areas of her body where she cannot reach easily are free from rash or excoriation. She is getting some redness and swelling in the legs. She has not been taking her diuretic regularly because of her urinary incontinence issues    5. Depression with anxiety  She is unusually nervous today. After discussing for a while with her and her son I finally realized she is nervous because her son has recommended assisted living. Have discussed with her  that I think this is an excellent idea. He works all day long, often 10-12 hour days.  He tries that is not trained as a caregiver. Discussed that I believe the increased socialization would be very good for her. She would have her own apartment. They are working with the  to facilitate this. I think it is the best for her. She feels the medication continues to be helpful. She is able to sleep at least 6 hours, often 8.  Denies suicidal ideation or plan. Denies morbid thoughts. With her increased anxiety she is having more itching and burning in her skin. Discussed that this is not a parasite infection as she thinks it is. I believe she is not truly delusional, I believe she is just primarily very anxious.     6. Idiopathic atrophic hypothyroidism  Patient reports good energy level on the medication. Patient denies insomnia, tremor or change in appetite.  Patient is taking the medication on an empty stomach in the morning and waiting at least 30 minutes before eating.  Last TSH in October was at target. Clinically stable.    7. Mixed urge and stress incontinence  She displays both stress and urge incontinence. She is frustrated by this. She does not like using disposable garments. She continues to take the Detrol LA daily. She feels it helps some but does not take care of the problem. She is on diuretics, furosemide every morning. Explained that the furosemide does make the situation worse without it she gets significant edema. Discussed watching salt intake which might let her use lower doses of the diuretic and help the overall situation. She is concerned about the stress incontinence when she changes position or tries to lift. Discussed that unfortunately that is really only treated well by surgery and she is not a good surgical candidate. I would not recommend that.  Unfortunately stable.       Patient Active Problem List    Diagnosis Date Noted   • Acute encephalopathy - 2/2 drug OD and hypoxemia  02/10/2017     Priority: High   • Acute respiratory failure with hypoxia and hypercarbia (CMS-HCC) 02/10/2017     Priority: High   • Encephalopathy 11/19/2016     Priority: High   • ARF (acute renal failure) (CMS-HCC) 11/18/2016     Priority: High   • SIRS (systemic inflammatory response syndrome) (CMS-HCC) 11/18/2016     Priority: High   • Metabolic encephalopathy 11/18/2016     Priority: High   • Essential hypertension, benign 05/17/2016     Priority: High   • Mixed hyperlipidemia 10/12/2015     Priority: High   • Chronic anticoagulation 10/12/2015     Priority: High   • LBBB (left bundle branch block) 10/12/2015     Priority: High   • CKD (chronic kidney disease) stage 3, GFR 30-59 ml/min 08/23/2014     Priority: High   • Essential hypertension 02/15/2013     Priority: High   • History of deep vein thrombosis 08/24/2012     Priority: High   • S/P IVC filter 10/08/2011     Priority: High   • PUD (peptic ulcer disease) 10/08/2011     Priority: High   • Muscle spasticity 12/07/2010     Priority: High   • Hemorrhagic disorder due to extrinsic circulating anticoagulants (CMS-HCC) 11/20/2016     Priority: Medium   • Transaminitis 11/19/2016     Priority: Medium   • Mild mitral regurgitation 10/12/2015     Priority: Medium   • Cardiomyopathy ([facility]) 01/06/2015     Priority: Medium   • History of pulmonary embolism 04/12/2013     Priority: Medium   • Atherosclerosis of native arteries of extremity with intermittent claudication (CMS-HCC) 12/19/2011     Priority: Medium   • 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/2011     Priority: Medium   • Rheumatoid arthritis (CMS-HCC) 12/07/2010     Priority: Medium   • Coagulopathy (CMS-HCC) 11/18/2016     Priority: Low   • Opioid dependence (CMS-HCC) 11/18/2016     Priority: Low   • Pulmonary hypertension (CMS-HCC) 01/31/2014     Priority: Low   • Anxiety 10/08/2011     Priority: Low   • Lumbar disc herniation with radiculopathy      Priority: Low   • Idiopathic atrophic  hypothyroidism      Priority: Low   • Protein C deficiency (CMS-HCC) 07/16/2009     Priority: Low   • Protein S deficiency (CMS-HCC) 07/16/2009     Priority: Low   • TMJ (temporomandibular joint syndrome) 06/19/2009     Priority: Low   • Emphysema lung (CMS-HCC) 02/11/2017   • Severe protein-calorie malnutrition (CMS-HCC) 02/11/2017   • Starvation ketoacidosis 02/10/2017   • Metabolic acidosis 02/10/2017   • Anemia due to multiple mechanisms 09/14/2016   • 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/2016   • Deep vein thrombosis (DVT) (CMS-HCC) 08/08/2016   • Bilateral cataracts 07/27/2016   • Vitamin D deficiency disease 02/11/2016   • Mild intermittent asthma 10/19/2015   • Iron deficiency anemia 07/24/2015   • Cervical disc disease with myelopathy 03/11/2015   • Muscle weakness of lower extremity 03/11/2015   • Upper extremity weakness 03/11/2015   • MEDICAL HOME 01/12/2015   • GERD (gastroesophageal reflux disease) 09/05/2014   • Mixed urge and stress incontinence    • Chronic pain syndrome 08/21/2014   • Insomnia 03/10/2014   • History of decubitus ulcer 08/24/2012       Current Outpatient Prescriptions   Medication Sig Dispense Refill   • carvedilol (COREG) 12.5 MG Tab Take 1 Tab by mouth 2 times a day, with meals. 60 Tab 6   • ammonium lactate (LAC-HYDRIN) 12 % Lotion Apply to affected area bid 1 Bottle 3   • cefUROXime (CEFTIN) 250 MG Tab Take 1 Tab by mouth 2 times a day for 14 days. 28 Tab 0   • buPROPion SR (WELLBUTRIN-SR) 100 MG TABLET SR 12 HR Take 2 Tabs by mouth 2 Times a Day. 120 Tab 6   • levothyroxine (SYNTHROID) 125 MCG Tab Take 1 Tab by mouth Every morning on an empty stomach. 30 Tab 6   • tolterodine ER (DETROL LA) 4 MG CAPSULE SR 24 HR Take 1 Cap by mouth every day. 30 Cap 6   • Simethicone 125 MG Cap Spray 125 mg in mouth/throat as needed (gas).     • tizanidine (ZANAFLEX) 4 MG capsule Take 4 mg by mouth 3 times a day.     • chlorpheniramine (CHLOR-TRIMETRON) 4 MG Tab Take 4 mg by mouth every 6  "hours as needed for Allergies.     • furosemide (LASIX) 40 MG Tab Take 40 mg by mouth 2 Times a Day.     • lisinopril (PRINIVIL) 5 MG Tab Take 5 mg by mouth 2 times a day.     • esomeprazole magnesium (NEXIUM) 40 MG Pack Spray 40 mg in mouth/throat every day.     • mupirocin (BACTROBAN) 2 % Ointment Apply 1 Inch to affected area(s) every day. 1 Tube 11   • lisinopril (PRINIVIL) 5 MG Tab Take 5 mg by mouth every day.     • ondansetron (ZOFRAN ODT) 4 MG TABLET DISPERSIBLE Take 4 mg by mouth every 8 hours as needed for Nausea/Vomiting.     • morphine ER (MS CONTIN) 15 MG Tab CR tablet Take 15 mg by mouth every 12 hours.     • oxycodone-acetaminophen (PERCOCET)  MG Tab Take 1 Tab by mouth every 6 hours as needed. 30 Tab 0   • trazodone (DESYREL) 100 MG Tab Take 2 Tabs by mouth every bedtime. 60 Tab 6   • warfarin (COUMADIN) 3 MG Tab Take 2 Tabs by mouth every bedtime. 60 Tab 6   • albuterol (PROAIR HFA) 108 (90 BASE) MCG/ACT Aero Soln inhalation aerosol Inhale 2 Puffs by mouth every 6 hours as needed for Shortness of Breath. 8.5 g 3   • docusate sodium (COLACE) 100 MG CAPS Take 200 mg by mouth 2 times a day.     • sennosides (SENOKOT) 8.6 MG TABS Take 8.6 mg by mouth every day. Indications: Constipation, **OTC**       No current facility-administered medications for this visit.         Allergies as of 03/15/2017 - Rico as Reviewed 03/15/2017   Allergen Reaction Noted   • Atorvastatin  12/30/2014   • Ezetimibe  12/30/2014   • Gabapentin  12/30/2014   • Metoprolol  12/30/2014   • Other environmental  12/22/2011   • Spironolactone  02/26/2016   • Statins [hmg-coa-r inhibitors]  12/30/2014   • Tape Hives and Rash 08/01/2008        ROS: As per HPI.    /80 mmHg  Pulse 100  Temp(Src) 36.2 °C (97.2 °F)  Resp 16  Ht 1.753 m (5' 9\")  Wt 60.328 kg (133 lb)  BMI 19.63 kg/m2  SpO2 92% on room air at rest    Physical Exam:  Gen:         Alert and oriented, in some distress with anxiety and tearfulness.  Lucid and " fluent. She is in her motorized wheelchair.  Neck:       Full ROM, mild posterior cervical spasm. No JVD or carotid bruits appreciated. No cervical adenopathy appreciated. No thyromegaly or neck masses appreciated.  Lungs:     Clear to auscultation bilaterally, good air movement today  CV:          Regular rate and rhythm. No murmurs, rubs or gallops appreciated.               Ext:          No clubbing, cyanosis, 1+ pitting edema.  Skin:        Excoriations anterior legs, especially shins, posterior legs are fine.  There is 1+ pitting edema of both shins anteriorly and bilateral mild erythema and warmth.     Assessment and Plan.   71 y.o. female with the following issues.    1. Chronic anticoagulation  She will continue on the warfarin. She is trying to work with the anticoagulation clinic. Discussed setting up a regular time with access transport to get to there. Her son is trying to get her to assisted living where she would have reliable middle of the day transportation. I think this is an excellent idea.    2. History of deep vein thrombosis/status post IVC filter  She will continue her anticoagulation.    3. CKD (chronic kidney disease) stage 3, GFR 30-59 ml/min  This is stable, she will continue regular recheck    4. Cardiomyopathy (CMS-HCC)  This is stable. Have discussed setting up a cardiology follow-up visit  - carvedilol (COREG) 12.5 MG Tab; Take 1 Tab by mouth 2 times a day, with meals.  Dispense: 60 Tab; Refill: 6    5. Excoriated rash  Have prescribed antibiotics. Recent hospitalization was in part due to infection. Have also asked her to use Lac-Hydrin lotion twice daily to reduce the itching and discomfort of the skin.  - ammonium lactate (LAC-HYDRIN) 12 % Lotion; Apply to affected area bid  Dispense: 1 Bottle; Refill: 3  - cefUROXime (CEFTIN) 250 MG Tab; Take 1 Tab by mouth 2 times a day for 14 days.  Dispense: 28 Tab; Refill: 0    6. Depression with anxiety  She has tolerated this medication well  and it has been helpful it is renewed  - buPROPion SR (WELLBUTRIN-SR) 100 MG TABLET SR 12 HR; Take 2 Tabs by mouth 2 Times a Day.  Dispense: 120 Tab; Refill: 6    7. Idiopathic atrophic hypothyroidism  Stay on same regimen, at goal  - levothyroxine (SYNTHROID) 125 MCG Tab; Take 1 Tab by mouth Every morning on an empty stomach.  Dispense: 30 Tab; Refill: 6    8. Mixed urge and stress incontinence  I believe she is getting maximum benefit from this regimen. Discussed limiting salt to be able to reduce the diuretic.  - tolterodine ER (DETROL LA) 4 MG CAPSULE SR 24 HR; Take 1 Cap by mouth every day.  Dispense: 30 Cap; Refill: 6

## 2017-03-16 ENCOUNTER — ANTICOAGULATION MONITORING (OUTPATIENT)
Dept: VASCULAR LAB | Facility: MEDICAL CENTER | Age: 72
End: 2017-03-16

## 2017-03-16 ENCOUNTER — HOME CARE VISIT (OUTPATIENT)
Dept: HOME HEALTH SERVICES | Facility: HOME HEALTHCARE | Age: 72
End: 2017-03-16
Payer: MEDICARE

## 2017-03-16 DIAGNOSIS — D68.59 PROTEIN S DEFICIENCY (HCC): ICD-10-CM

## 2017-03-16 DIAGNOSIS — Z79.01 CHRONIC ANTICOAGULATION: ICD-10-CM

## 2017-03-16 DIAGNOSIS — D68.59 PROTEIN C DEFICIENCY (HCC): ICD-10-CM

## 2017-03-16 DIAGNOSIS — Z95.828 S/P IVC FILTER: ICD-10-CM

## 2017-03-16 LAB — INR PPP: NORMAL (ref 2–3.5)

## 2017-03-16 PROCEDURE — 665998 HH PPS REVENUE CREDIT

## 2017-03-16 PROCEDURE — G0162 HHC RN E&M PLAN SVS, 15 MIN: HCPCS

## 2017-03-16 PROCEDURE — 665997 HH PPS REVENUE ADJ

## 2017-03-16 PROCEDURE — 665999 HH PPS REVENUE DEBIT

## 2017-03-17 VITALS
DIASTOLIC BLOOD PRESSURE: 64 MMHG | SYSTOLIC BLOOD PRESSURE: 128 MMHG | RESPIRATION RATE: 14 BRPM | WEIGHT: 135 LBS | BODY MASS INDEX: 19.93 KG/M2

## 2017-03-17 PROCEDURE — 665999 HH PPS REVENUE DEBIT

## 2017-03-17 PROCEDURE — 665998 HH PPS REVENUE CREDIT

## 2017-03-17 SDOH — ECONOMIC STABILITY: HOUSING INSECURITY: UNSAFE APPLIANCES: 0

## 2017-03-17 SDOH — ECONOMIC STABILITY: HOUSING INSECURITY: UNSAFE COOKING RANGE AREA: 0

## 2017-03-17 ASSESSMENT — ENCOUNTER SYMPTOMS
POOR JUDGMENT: 1
DEPRESSED MOOD: 1

## 2017-03-17 ASSESSMENT — ACTIVITIES OF DAILY LIVING (ADL)
HOME_HEALTH_OASIS: 01
OASIS_M1830: 02

## 2017-03-17 NOTE — PROGRESS NOTES
Anticoagulation Summary as of 3/16/2017     INR goal 2.0-3.0   Selected INR 1.1 or 2.1 (3/16/2017)   The INR is not numeric and cannot be flagged as normal/abnormal.   Maintenance plan 6 mg (3 mg x 2) every day   Weekly total 42 mg   Plan last modified Star Betts, PHARMD (3/16/2017)   Next INR check 3/20/2017   Target end date Indefinite    Indications   Chronic anticoagulation [Z79.01]  Protein C deficiency (CMS-[facility]) [D68.59]  Protein S deficiency (CMS-[facility]) [D68.59]  Deep vein thrombosis (DVT) (CMS-HCC) [I82.409]  S/P IVC filter [Z95.828]         Anticoagulation Episode Summary     INR check location     Preferred lab     Send INR reminders to     Comments       Anticoagulation Care Providers     Provider Role Specialty Phone number    Julissa Reeves M.D. Referring Family Medicine [phone]    NORRIS CokerD Responsible          Anticoagulation Patient Findings    Patient's INR was SUB therapeutic, however pt states she thought the nurse said 2.1.  Likely pt was 2.1 as dosing hasn't changed according to pt.    Denies any unusual s/s of bleeding, bruising, clotting.  Denies any changes to:   Diet   Medications  Confirmed dosing regimen. She is certain she has always taken 6 mg daily and states that's what she has been doing for week.  This also suggests INR is likely therapeutic.    Pt is to continue with current warfarin dosing regimen.    Follow up in 4 days.    Star Betts, NORRISD

## 2017-03-18 PROCEDURE — 665999 HH PPS REVENUE DEBIT

## 2017-03-18 PROCEDURE — 665998 HH PPS REVENUE CREDIT

## 2017-03-19 PROCEDURE — 665998 HH PPS REVENUE CREDIT

## 2017-03-19 PROCEDURE — 665999 HH PPS REVENUE DEBIT

## 2017-03-20 PROCEDURE — 665998 HH PPS REVENUE CREDIT

## 2017-03-20 PROCEDURE — 665999 HH PPS REVENUE DEBIT

## 2017-03-21 PROCEDURE — 665998 HH PPS REVENUE CREDIT

## 2017-03-21 PROCEDURE — 665999 HH PPS REVENUE DEBIT

## 2017-03-22 PROCEDURE — 665999 HH PPS REVENUE DEBIT

## 2017-03-22 PROCEDURE — 665998 HH PPS REVENUE CREDIT

## 2017-03-23 PROCEDURE — 665999 HH PPS REVENUE DEBIT

## 2017-03-23 PROCEDURE — 665998 HH PPS REVENUE CREDIT

## 2017-03-24 PROCEDURE — 665998 HH PPS REVENUE CREDIT

## 2017-03-24 PROCEDURE — 665999 HH PPS REVENUE DEBIT

## 2017-03-25 PROCEDURE — 665999 HH PPS REVENUE DEBIT

## 2017-03-25 PROCEDURE — 665998 HH PPS REVENUE CREDIT

## 2017-03-26 PROCEDURE — 665998 HH PPS REVENUE CREDIT

## 2017-03-26 PROCEDURE — 665999 HH PPS REVENUE DEBIT

## 2017-03-29 ENCOUNTER — TELEPHONE (OUTPATIENT)
Dept: MEDICAL GROUP | Facility: CLINIC | Age: 72
End: 2017-03-29

## 2017-03-29 NOTE — TELEPHONE ENCOUNTER
1. Caller Name: Dian Mendoza                      Call Back Number: [phone] (home)     2. Message: inhome INR results (1.0). Pt very worried. Please call her with instructions. Thank you .     3. Patient approves office to leave a detailed voicemail/MyChart message: yes

## 2017-03-29 NOTE — TELEPHONE ENCOUNTER
Discussed with patient, has been taking 6 mg nightly, last taken last night.  Increased to 9 mg (1 and 1/2) nightly.  She says she wrote it down.  The pro time will be checked in one week. She will recheck in one week.  States the cream is really helping her legs.

## 2017-03-30 ENCOUNTER — TELEPHONE (OUTPATIENT)
Dept: MEDICAL GROUP | Facility: CLINIC | Age: 72
End: 2017-03-30

## 2017-04-04 ENCOUNTER — TELEPHONE (OUTPATIENT)
Dept: VASCULAR LAB | Facility: MEDICAL CENTER | Age: 72
End: 2017-04-04

## 2017-04-04 NOTE — TELEPHONE ENCOUNTER
S/w patient son about her overdue of INR.  He will be checking INR with home meter tomorrow and phone results to Anticoagulation Clinic.  Quincy Osorio, PHARMD

## 2017-04-05 ENCOUNTER — ANTICOAGULATION MONITORING (OUTPATIENT)
Dept: VASCULAR LAB | Facility: MEDICAL CENTER | Age: 72
End: 2017-04-05

## 2017-04-05 DIAGNOSIS — D68.59 PROTEIN C DEFICIENCY (HCC): ICD-10-CM

## 2017-04-05 DIAGNOSIS — D68.59 PROTEIN S DEFICIENCY (HCC): ICD-10-CM

## 2017-04-05 DIAGNOSIS — Z79.01 CHRONIC ANTICOAGULATION: ICD-10-CM

## 2017-04-05 DIAGNOSIS — Z95.828 S/P IVC FILTER: ICD-10-CM

## 2017-04-05 LAB — INR PPP: 1.6 (ref 2–3.5)

## 2017-04-05 NOTE — PROGRESS NOTES
Anticoagulation Summary as of 4/5/2017     INR goal 2.0-3.0   Selected INR 1.6! (4/5/2017)   Maintenance plan 9 mg (3 mg x 3) on Wed; 6 mg (3 mg x 2) all other days   Weekly total 45 mg   Plan last modified Quincy Osorio PHARMD (4/5/2017)   Next INR check 4/12/2017   Target end date Indefinite    Indications   Chronic anticoagulation [Z79.01]  Protein C deficiency (CMS-HCC) [D68.59]  Protein S deficiency (CMS-HCC) [D68.59]  Deep vein thrombosis (DVT) (CMS-HCC) [I82.409]  S/P IVC filter [Z95.828]         Anticoagulation Episode Summary     INR check location     Preferred lab     Send INR reminders to     Comments       Anticoagulation Care Providers     Provider Role Specialty Phone number    Julissa Reeves M.D. Referring Family Medicine [phone]    Quincy Osorio PHARMD Responsible          Anticoagulation Patient Findings   Negatives Missed Doses, Extra Doses, Medication Changes, Antibiotic Use, Diet Changes, Dental/Other Procedures, Hospitalization, Bleeding Gums, Nose Bleeds, Blood in Urine, Blood in Stool, Any Bruising, Other Complaints        Spoke with patient today regarding subtherapeutic INR of 1.6.  Patient denies any signs/symptoms of bruising or bleeding or any changes in diet and medications.  Instructed patient to call clinic with any questions or concerns.  Instructed patient to increase weekly warfarin regimen by ~7% as detailed above.  Follow up in 1 weeks.    Quincy Osorio PHARMD

## 2017-04-19 ENCOUNTER — ANTICOAGULATION MONITORING (OUTPATIENT)
Dept: VASCULAR LAB | Facility: MEDICAL CENTER | Age: 72
End: 2017-04-19

## 2017-04-19 DIAGNOSIS — D68.59 PROTEIN S DEFICIENCY (HCC): ICD-10-CM

## 2017-04-19 DIAGNOSIS — D68.59 PROTEIN C DEFICIENCY (HCC): ICD-10-CM

## 2017-04-19 DIAGNOSIS — Z95.828 S/P IVC FILTER: ICD-10-CM

## 2017-04-19 DIAGNOSIS — Z79.01 CHRONIC ANTICOAGULATION: ICD-10-CM

## 2017-04-19 LAB — INR PPP: 2.2 (ref 2–3.5)

## 2017-04-19 NOTE — PROGRESS NOTES
Anticoagulation Summary as of 4/19/2017     INR goal 2.0-3.0   Selected INR 2.2 (4/19/2017)   Maintenance plan 9 mg (3 mg x 3) on Wed; 6 mg (3 mg x 2) all other days   Weekly total 45 mg   Plan last modified Quincy Osorio PHARMD (4/5/2017)   Next INR check 5/3/2017   Target end date Indefinite    Indications   Chronic anticoagulation [Z79.01]  Protein C deficiency (CMS-HCC) [D68.59]  Protein S deficiency (CMS-HCC) [D68.59]  Deep vein thrombosis (DVT) (CMS-HCC) [I82.409]  S/P IVC filter [Z95.828]         Anticoagulation Episode Summary     INR check location     Preferred lab     Send INR reminders to     Comments       Anticoagulation Care Providers     Provider Role Specialty Phone number    Julissa Reeves M.D. Referring Family Medicine [phone]    Quincy Osorio PHARMD Responsible          Anticoagulation Patient Findings   Negatives Missed Doses, Extra Doses, Medication Changes, Antibiotic Use, Diet Changes, Dental/Other Procedures, Hospitalization, Bleeding Gums, Nose Bleeds, Blood in Urine, Blood in Stool, Any Bruising, Other Complaints        Spoke with patient today regarding therapeutic INR of 2.2.  Patient denies any signs/symptoms of bruising or bleeding or any changes in diet and medications.  Instructed patient to call clinic with any questions or concerns.  Pt is to continue with current warfarin dosing regimen.  Follow up in 2 weeks.    Quincy Osorio PHARMD

## 2017-04-25 RX ORDER — ONDANSETRON 4 MG/1
TABLET, FILM COATED ORAL
Qty: 60 TAB | Refills: 0 | Status: SHIPPED | OUTPATIENT
Start: 2017-04-25 | End: 2017-06-29

## 2017-04-25 NOTE — TELEPHONE ENCOUNTER
Was the patient seen in the last year in this department? Yes     Does patient have an active prescription for medications requested? Yes     Received Request Via: Pharmacy

## 2017-04-26 ENCOUNTER — TELEPHONE (OUTPATIENT)
Dept: MEDICAL GROUP | Facility: CLINIC | Age: 72
End: 2017-04-26

## 2017-04-26 DIAGNOSIS — D68.59 PROTEIN S DEFICIENCY (HCC): ICD-10-CM

## 2017-04-26 DIAGNOSIS — Z79.01 CHRONIC ANTICOAGULATION: ICD-10-CM

## 2017-04-26 DIAGNOSIS — D68.59 PROTEIN C DEFICIENCY (HCC): ICD-10-CM

## 2017-04-26 NOTE — TELEPHONE ENCOUNTER
1. Caller Name: Alexander son                      Call Back Number: [phone] (home)     2. Message: in home INR result (1.6). Please advise also should pt be testing weekly or biweekly? Thank you    3. Patient approves office to leave a detailed voicemail/MyChart message: yes

## 2017-04-27 RX ORDER — WARFARIN SODIUM 3 MG/1
6 TABLET ORAL SEE ADMIN INSTRUCTIONS
Qty: 70 TAB | Refills: 3 | Status: SHIPPED | OUTPATIENT
Start: 2017-04-27 | End: 2017-06-21 | Stop reason: SDUPTHER

## 2017-04-27 NOTE — TELEPHONE ENCOUNTER
Testing weekly until the INR is back in the 2.0 to 3 range.  How much warfarin is she currently taking?

## 2017-04-27 NOTE — TELEPHONE ENCOUNTER
Please increase to 9 am every Wednesday and Saturday, 6 mg all remaining days.  Continue weekly checking for now.

## 2017-04-29 RX ORDER — ONDANSETRON 4 MG/1
TABLET, FILM COATED ORAL
Qty: 60 TAB | Refills: 3 | Status: SHIPPED | OUTPATIENT
Start: 2017-04-29 | End: 2017-09-19 | Stop reason: SDUPTHER

## 2017-05-11 ENCOUNTER — ANTICOAGULATION MONITORING (OUTPATIENT)
Dept: VASCULAR LAB | Facility: MEDICAL CENTER | Age: 72
End: 2017-05-11

## 2017-05-11 DIAGNOSIS — D68.59 PROTEIN S DEFICIENCY (HCC): ICD-10-CM

## 2017-05-11 DIAGNOSIS — D68.59 PROTEIN C DEFICIENCY (HCC): ICD-10-CM

## 2017-05-11 DIAGNOSIS — Z95.828 S/P IVC FILTER: ICD-10-CM

## 2017-05-11 DIAGNOSIS — Z79.01 CHRONIC ANTICOAGULATION: ICD-10-CM

## 2017-05-11 LAB — INR PPP: 1.7 (ref 2–3.5)

## 2017-05-11 NOTE — PROGRESS NOTES
OP Anticoagulation Service Note    Date: 5/11/2017    Anticoagulation Summary as of 5/11/2017     INR goal 2.0-3.0   Selected INR 1.7! (5/11/2017)   Maintenance plan 9 mg (3 mg x 3) on Sun, Tue, Thu; 6 mg (3 mg x 2) all other days   Weekly total 51 mg   Plan last modified Mariah Ortiz PHARMD (5/11/2017)   Next INR check 5/18/2017   Target end date Indefinite    Indications   Chronic anticoagulation [Z79.01]  Protein C deficiency (CMS-[facility]) [D68.59]  Protein S deficiency (CMS-[facility]) [D68.59]  Deep vein thrombosis (DVT) (CMS-[facility]) [I82.409]  S/P IVC filter [Z95.828]         Anticoagulation Episode Summary     INR check location     Preferred lab     Send INR reminders to     Comments       Anticoagulation Care Providers     Provider Role Specialty Phone number    Julissa Reeves M.D. Referring Family Medicine [phone]    Quincy Osorio PHARMD Responsible            Plan:  INR is low today. Spoke with pt on the phone. Her PCP called and gave her directions for her last INR, she was instructed to begin increased regimen of 9 mg Th/Sat then 6 mg ROW.   Patient denies sign/symptoms of bleeding/clotting. No recent medication changes and patient has been eating a consistent diet.  Instructed pt to call clinic with any concerns of bleeding or thrombosis. Will have pt take 12 mg tonight then tomorrow begin increased weekly regimen Follow up in 1 week    Mariah Ortiz, Pharm D

## 2017-05-24 RX ORDER — NORTRIPTYLINE HYDROCHLORIDE 10 MG/1
CAPSULE ORAL
Qty: 30 CAP | Refills: 0 | Status: SHIPPED | OUTPATIENT
Start: 2017-05-24 | End: 2017-05-30

## 2017-05-26 ENCOUNTER — ANTICOAGULATION MONITORING (OUTPATIENT)
Dept: VASCULAR LAB | Facility: MEDICAL CENTER | Age: 72
End: 2017-05-26

## 2017-05-26 ENCOUNTER — TELEPHONE (OUTPATIENT)
Dept: MEDICAL GROUP | Facility: CLINIC | Age: 72
End: 2017-05-26

## 2017-05-26 DIAGNOSIS — Z95.828 S/P IVC FILTER: ICD-10-CM

## 2017-05-26 DIAGNOSIS — D68.59 PROTEIN S DEFICIENCY (HCC): ICD-10-CM

## 2017-05-26 DIAGNOSIS — Z79.01 CHRONIC ANTICOAGULATION: ICD-10-CM

## 2017-05-26 DIAGNOSIS — D68.59 PROTEIN C DEFICIENCY (HCC): ICD-10-CM

## 2017-05-26 LAB — INR PPP: 1.7 (ref 2–3.5)

## 2017-05-26 RX ORDER — ESOMEPRAZOLE MAGNESIUM 40 MG/1
CAPSULE, DELAYED RELEASE ORAL
Qty: 30 CAP | Refills: 5 | Status: SHIPPED | OUTPATIENT
Start: 2017-05-26 | End: 2017-10-12 | Stop reason: SDUPTHER

## 2017-05-26 NOTE — TELEPHONE ENCOUNTER
I would like her to take 3 tablets, so 9 mg every Saturday night and Wednesday night. All remaining nights will be 2 tablets of 3 mg as she has been doing. I would like her to check her INR again next week.

## 2017-05-26 NOTE — TELEPHONE ENCOUNTER
----- Message from Julissa Reeves M.D. sent at 5/26/2017  4:44 PM PDT -----  This is low.  How much coumadin is she taking?    Javon Reeves M.D.  ----- Message -----     From: Roshni Macedo, Med Ass't     Sent: 5/26/2017   1:16 PM       To: Julissa Reeves M.D.    Patient called and would like you to know that her INR is 1.7 today

## 2017-05-26 NOTE — PROGRESS NOTES
Anticoagulation Summary as of 5/26/2017     INR goal 2.0-3.0   Selected INR 1.7! (5/26/2017)   Maintenance plan 9 mg (3 mg x 3) on Sun, Tue, Thu; 6 mg (3 mg x 2) all other days   Weekly total 51 mg   Plan last modified NORRIS RichardD (5/11/2017)   Next INR check    Target end date Indefinite    Indications   Chronic anticoagulation [Z79.01]  Protein C deficiency (CMS-Former[facility]) [D68.59]  Protein S deficiency (CMS-[facility]ly [facility]) [D68.59]  Deep vein thrombosis (DVT) (CMS-Formerly [facility]) [I82.409]  S/P IVC filter [Z95.828]         Anticoagulation Episode Summary     INR check location     Preferred lab     Discontinue date 5/26/2017    Discontinue reason Anticoagulation management assumed by another practice    Send INR reminders to     Comments       Anticoagulation Care Providers     Provider Role Specialty Phone number    Julissa Reeves M.D. Referring Family Medicine [phone]    Quincy Osorio, NORRISD Responsible          Anticoagulation Patient Findings    Pt's INR is SUBtherapeutic.   Pt has been dosed by her PCP for months per pt.    She prefers to have PCP dose warfarin.  D/C'd from [facility].  Instructed pt to contact clinic for any future anticoagulation needs.    Star Betts, PHARMD

## 2017-05-30 ENCOUNTER — ANTICOAGULATION MONITORING (OUTPATIENT)
Dept: VASCULAR LAB | Facility: MEDICAL CENTER | Age: 72
End: 2017-05-30

## 2017-05-30 ENCOUNTER — TELEPHONE (OUTPATIENT)
Dept: VASCULAR LAB | Facility: MEDICAL CENTER | Age: 72
End: 2017-05-30

## 2017-05-30 RX ORDER — NORTRIPTYLINE HYDROCHLORIDE 10 MG/1
CAPSULE ORAL
Qty: 30 CAP | Refills: 6 | Status: SHIPPED | OUTPATIENT
Start: 2017-05-30 | End: 2018-02-12 | Stop reason: SDUPTHER

## 2017-05-31 NOTE — TELEPHONE ENCOUNTER
Above-noted.  Based on above conversation we will defer all further management of anticoagulation to Dr. Reeves pending any further patient contact for recommendations from PCP    Michael J. Bloch, MD  Anticoagulation Center    CC: Dr. Reeves

## 2017-06-07 ENCOUNTER — TELEPHONE (OUTPATIENT)
Dept: MEDICAL GROUP | Facility: CLINIC | Age: 72
End: 2017-06-07

## 2017-06-07 NOTE — TELEPHONE ENCOUNTER
1. Caller Name: Dian Mendoza                      Call Back Number: [phone] (home)     2. Message: results INR (1.5), what would you like her to do, please advise.     3. Patient approves office to leave a detailed voicemail/MyChart message: yes

## 2017-06-08 NOTE — TELEPHONE ENCOUNTER
Increase to 9 mg on odd days and 6 mg on even days.  Please have the patient repeat back to you.  Recheck pro-time in one month

## 2017-06-16 DIAGNOSIS — M62.838 MUSCLE SPASTICITY: ICD-10-CM

## 2017-06-16 DIAGNOSIS — R21 EXCORIATED RASH: ICD-10-CM

## 2017-06-16 RX ORDER — AMMONIUM LACTATE 12 G/100G
LOTION TOPICAL
Qty: 226 G | Refills: 2 | Status: SHIPPED | OUTPATIENT
Start: 2017-06-16 | End: 2017-09-19 | Stop reason: SDUPTHER

## 2017-06-16 RX ORDER — TIZANIDINE 4 MG/1
TABLET ORAL
Qty: 90 TAB | Refills: 3 | Status: SHIPPED
Start: 2017-06-16 | End: 2018-02-12 | Stop reason: SDUPTHER

## 2017-06-21 ENCOUNTER — TELEPHONE (OUTPATIENT)
Dept: MEDICAL GROUP | Facility: CLINIC | Age: 72
End: 2017-06-21

## 2017-06-21 DIAGNOSIS — D68.59 PROTEIN S DEFICIENCY (HCC): ICD-10-CM

## 2017-06-21 DIAGNOSIS — Z79.01 CHRONIC ANTICOAGULATION: ICD-10-CM

## 2017-06-21 DIAGNOSIS — D68.59 PROTEIN C DEFICIENCY (HCC): ICD-10-CM

## 2017-06-21 RX ORDER — WARFARIN SODIUM 3 MG/1
9 TABLET ORAL DAILY
Qty: 90 TAB | Refills: 3 | Status: SHIPPED | OUTPATIENT
Start: 2017-06-21 | End: 2017-12-15 | Stop reason: SDUPTHER

## 2017-06-21 NOTE — TELEPHONE ENCOUNTER
VOICEMAIL  1. Caller Name: Dian Mendoza                      Call Back Number: [phone] (home)     2. Message: in-home INR today (1.3) please advise what to do this is really bad for her, 8 DVT's in the past really scare.     3. Patient approves office to leave a detailed voicemail/MyChart message: yes

## 2017-06-21 NOTE — TELEPHONE ENCOUNTER
Spoke with patient person to person. She has been taking only to the warfarin nightly because she was under the impression that the increased dose that we gave her was just for a day or 2 and then she will go back to her previous dose. Have discussed with her clearly now that I would like her to take the increased dose, that is 9 mg, 3 tablets nightly every Wednesday, Saturday and Monday. All remaining days she will take 2 tablets so 6 mg. She will come see me next week and do an INR right before she sees me.

## 2017-06-22 ENCOUNTER — OFFICE VISIT (OUTPATIENT)
Dept: CARDIOLOGY | Facility: MEDICAL CENTER | Age: 72
End: 2017-06-22
Payer: MEDICARE

## 2017-06-22 VITALS
SYSTOLIC BLOOD PRESSURE: 132 MMHG | HEART RATE: 74 BPM | DIASTOLIC BLOOD PRESSURE: 70 MMHG | WEIGHT: 150 LBS | BODY MASS INDEX: 22.22 KG/M2 | HEIGHT: 69 IN | OXYGEN SATURATION: 96 %

## 2017-06-22 DIAGNOSIS — I10 ESSENTIAL HYPERTENSION, BENIGN: ICD-10-CM

## 2017-06-22 DIAGNOSIS — Z95.828 S/P IVC FILTER: ICD-10-CM

## 2017-06-22 DIAGNOSIS — I44.7 LBBB (LEFT BUNDLE BRANCH BLOCK): ICD-10-CM

## 2017-06-22 DIAGNOSIS — I42.0 DILATED CARDIOMYOPATHY (HCC): ICD-10-CM

## 2017-06-22 DIAGNOSIS — Z86.718 HISTORY OF DEEP VEIN THROMBOSIS: ICD-10-CM

## 2017-06-22 DIAGNOSIS — E78.2 MIXED HYPERLIPIDEMIA: ICD-10-CM

## 2017-06-22 DIAGNOSIS — D68.59 PROTEIN S DEFICIENCY (HCC): ICD-10-CM

## 2017-06-22 DIAGNOSIS — Z86.711 HISTORY OF PULMONARY EMBOLISM: ICD-10-CM

## 2017-06-22 DIAGNOSIS — D68.59 PROTEIN C DEFICIENCY (HCC): ICD-10-CM

## 2017-06-22 DIAGNOSIS — Z79.01 CHRONIC ANTICOAGULATION: ICD-10-CM

## 2017-06-22 PROBLEM — E87.20 METABOLIC ACIDOSIS: Status: RESOLVED | Noted: 2017-02-10 | Resolved: 2017-06-22

## 2017-06-22 PROBLEM — E43 SEVERE PROTEIN-CALORIE MALNUTRITION (HCC): Status: RESOLVED | Noted: 2017-02-11 | Resolved: 2017-06-22

## 2017-06-22 PROBLEM — T73.0XXA STARVATION KETOACIDOSIS: Status: RESOLVED | Noted: 2017-02-10 | Resolved: 2017-06-22

## 2017-06-22 PROBLEM — E87.29 STARVATION KETOACIDOSIS: Status: RESOLVED | Noted: 2017-02-10 | Resolved: 2017-06-22

## 2017-06-22 PROCEDURE — 99214 OFFICE O/P EST MOD 30 MIN: CPT | Performed by: INTERNAL MEDICINE

## 2017-06-22 RX ORDER — CARVEDILOL 12.5 MG/1
6.25 TABLET ORAL 2 TIMES DAILY WITH MEALS
Qty: 60 TAB | Refills: 6
Start: 2017-06-22 | End: 2018-01-17 | Stop reason: SINTOL

## 2017-06-22 ASSESSMENT — ENCOUNTER SYMPTOMS
NECK PAIN: 1
BLOOD IN STOOL: 0
CHILLS: 0
WHEEZING: 0
BACK PAIN: 1
ORTHOPNEA: 0
WEIGHT LOSS: 0
SEIZURES: 0
BRUISES/BLEEDS EASILY: 0
LOSS OF CONSCIOUSNESS: 0
FLANK PAIN: 0
NERVOUS/ANXIOUS: 1
POLYDIPSIA: 0
FEVER: 0
COUGH: 0
FALLS: 0
DEPRESSION: 1
MEMORY LOSS: 0
PND: 0

## 2017-06-22 ASSESSMENT — LIFESTYLE VARIABLES: SUBSTANCE_ABUSE: 0

## 2017-06-22 NOTE — Clinical Note
[facility]   [address]   [address]  [place] [address]  Phone: [phone]  Fax: [phone]              Dian Mendoza  1945    Encounter Date: 6/22/2017    Ajith Reddy M.D.          PROGRESS NOTE:  Subjective:   Dian Mendoza is a 71 y.o. female who presents today for follow-up of her cardiomyopathy primarily.  She finds it very hard to get in because of limited mobility but her cardiac status has been remarkably symptom free. She does maintain her regular follow-up with Dr. Reeves. Worsening depression precluded her dose of carvedilol. She has had nothing to suggest angina nor palpitation. Edema has not increased. She's had no recurrent symptoms of coagulopathy and her pro time follow-up is very reliable.  Past Medical History   Diagnosis Date   • Seizure (CMS-Formerly [facility])      As a child   • Protein C deficiency (CMS-Former[facility])    • Protein S deficiency (CMS-Former[facility])    • Rheumatoid arthritis (CMS-Formerly [facility])      historical diagnosis   • Osteoarthritis      Spine and knees and jaw   • Hypothyroidism    • Anemia    • Hypokalemia      uncertain cause maintained with potassium replacement.   • Lumbar disc disease with radiculopathy    • Cancer of left breast (CMS-Formerly [facility]) 1997     Left breast   • Atrial tachycardia, paroxysmal (CMS-Formerly [facility])      occasional arrythmia, has been evaluated by Dr. Reddy   • Gastritis    • Full dentures    • COPD (chronic obstructive pulmonary disease) (CMS-Formerly [facility])    • Renal atrophy, right    • Pulmonary embolism (CMS-Formerly [facility])      6 Times.   • S/P insertion of IVC (inferior vena caval) filter      Prior to 2000, records not available, not retrievable.   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 coli sepsis (CMS-Formerly [facility])      Right buttock pressure wound   • MSSA (methicillin susceptible Staphylococcus aureus)      Vaginal and buttock   • Cholelithiasis 2014     With gallbladder  wall thickening, now status post cholecystectomy   • Hypertension     • Sleep apnea      Witnessed in hospital needs work up   • LBBB (left bundle branch block)    • Cardiomyopathy (CMS-HCC) 1/6/2015     Idiopathic with normal coronary arteries on cath 1/6/2015    • Systolic and diastolic CHF, chronic (CMS-HCC)    • Pulmonary hypertension (CMS-HCC)    • TMJ (temporomandibular joint syndrome)      Past Surgical History   Procedure Laterality Date   • Ulcer debridement  6/5/08     Performed by ARIELLE MITCHELL at [facility]ERY [facility]   • Flap closure  6/5/08     Performed by ARIELLE MITCHELL at [facility]ERY [facility]   • Tonsillectomy     • Knee arthroscopy       left   • Other orthopedic surgery       BILAT FOOT   • Bursa excision       bursa sac removed bilat hips   • Mastectomy       left   • Knee replacement, total  2009     left   • Lumbar fusion posterior  1998   • Abdominal hysterectomy total  1970's   • Elbow orif  1950     right   • Recovery  12/8/2010     Performed by TERRELL MCKENNA at SUR[facility]ERY [facility]   • Gastroscopy with biopsy  12/28/2011     Performed by RAD CANTU at [facility]   • Egd w/endoscopic ultrasound  12/28/2011     Performed by RAD CANTU at [facility]   • Ercp  12/28/2011     Performed by RAD CANTU at [facility]   • Colonoscopy  2001, 6/2010     normal   • Patricia by laparoscopy  8/27/2014     Performed by Ajith Kearney M.D. at SUR[facility]ERY [facility]   • Cataract extraction with iol Bilateral      Family History   Problem Relation Age of Onset   • Non-contributory Brother      copd, cirrhosis, alzheimer's mild   • Heart Disease Brother      3 MIs   • Heart Disease Sister    • Heart Disease Mother    • Heart Disease Father    • Cancer Father      esophageal   • GI Sister      colon polyps, precancerous   • Cancer Sister      breast X 2     History   Smoking status   • Former Smoker -- 1.50 packs/day for  "40 years   • Types: Cigarettes   • Quit date: 01/01/1972   Smokeless tobacco   • Former User     Allergies   Allergen Reactions   • Atorvastatin      \"I fell and hit my head but it could've been the other medications that I was taken.\"   • Ezetimibe      \"I fell and hit my head but it could've been the other medications that I was taken.\"   • Gabapentin      \"I fell and hit my head but it could've been the other medications that I was taken.\"   • Metoprolol      \"I fell and hit my head but it could've been the other medications that I was taken.\"     • Other Environmental    • Spironolactone      Abrupt renal failure   • Statins [Hmg-Coa-R Inhibitors]      \"I fell and hit my head but it could've been the other medications that I was taken.\"   • Tape Hives and Rash     Plastic tape     Outpatient Encounter Prescriptions as of 6/22/2017   Medication Sig Dispense Refill   • carvedilol (COREG) 12.5 MG Tab Take 0.5 Tabs by mouth 2 times a day, with meals. 60 Tab 6   • warfarin (COUMADIN) 3 MG Tab Take 3 Tabs by mouth every day. 90 Tab 3   • tizanidine (ZANAFLEX) 4 MG Tab TAKE ONE (1) TABLET BY MOUTH THREE TIMES DAILY  90 Tab 3   • ammonium lactate (LAC-HYDRIN) 12 % Lotion APPLY TO THE AFFECTED AREA TWICE DAILY 226 g 2   • nortriptyline (PAMELOR) 10 MG Cap TAKE ONE CAPSULE BY MOUTH NIGHTLY AT BEDTIME  30 Cap 6   • esomeprazole (NEXIUM) 40 MG delayed-release capsule TAKE ONE (1) CAPSULE BY MOUTH EVERY MORNING BEFORE BREAKFAST 30 Cap 5   • ondansetron (ZOFRAN) 4 MG Tab tablet TAKE ONE TABLET BY MOUTH EVERY EIGHT HOURS AS NEEDED NAUSEA AND VOMITING  60 Tab 0   • buPROPion SR (WELLBUTRIN-SR) 100 MG TABLET SR 12 HR Take 2 Tabs by mouth 2 Times a Day. 120 Tab 6   • levothyroxine (SYNTHROID) 125 MCG Tab Take 1 Tab by mouth Every morning on an empty stomach. 30 Tab 6   • tolterodine ER (DETROL LA) 4 MG CAPSULE SR 24 HR Take 1 Cap by mouth every day. 30 Cap 6   • Simethicone 125 MG Cap Spray 125 mg in mouth/throat as needed (gas). "     • chlorpheniramine (CHLOR-TRIMETRON) 4 MG Tab Take 4 mg by mouth every 6 hours as needed for Allergies.     • furosemide (LASIX) 40 MG Tab Take 40 mg by mouth 2 Times a Day.     • lisinopril (PRINIVIL) 5 MG Tab Take 5 mg by mouth 2 times a day.     • mupirocin (BACTROBAN) 2 % Ointment Apply 1 Inch to affected area(s) every day. 1 Tube 11   • morphine ER (MS CONTIN) 15 MG Tab CR tablet Take 15 mg by mouth every 12 hours.     • oxycodone-acetaminophen (PERCOCET)  MG Tab Take 1 Tab by mouth every 6 hours as needed. 30 Tab 0   • trazodone (DESYREL) 100 MG Tab Take 2 Tabs by mouth every bedtime. 60 Tab 6   • albuterol (PROAIR HFA) 108 (90 BASE) MCG/ACT Aero Soln inhalation aerosol Inhale 2 Puffs by mouth every 6 hours as needed for Shortness of Breath. 8.5 g 3   • docusate sodium (COLACE) 100 MG CAPS Take 200 mg by mouth 2 times a day.     • sennosides (SENOKOT) 8.6 MG TABS Take 8.6 mg by mouth every day. Indications: Constipation, **OTC**     • ondansetron (ZOFRAN) 4 MG Tab tablet TAKE ONE TABLET BY MOUTH EVERY EIGHT HOURS AS NEEDED NAUSEA AND VOMITING  60 Tab 3   • [DISCONTINUED] carvedilol (COREG) 12.5 MG Tab Take 1 Tab by mouth 2 times a day, with meals. 60 Tab 6   • esomeprazole magnesium (NEXIUM) 40 MG Pack Spray 40 mg in mouth/throat every day.     • lisinopril (PRINIVIL) 5 MG Tab Take 5 mg by mouth every day.       No facility-administered encounter medications on file as of 6/22/2017.     Review of Systems   Constitutional: Positive for malaise/fatigue. Negative for fever, chills and weight loss.   HENT: Negative for congestion and nosebleeds.    Respiratory: Negative for cough and wheezing.    Cardiovascular: Negative for orthopnea and PND.   Gastrointestinal: Negative for blood in stool and melena.   Genitourinary: Negative for hematuria and flank pain.   Musculoskeletal: Positive for back pain and neck pain. Negative for falls.   Skin: Negative for rash.   Neurological: Negative for seizures and  "loss of consciousness.   Endo/Heme/Allergies: Negative for polydipsia. Does not bruise/bleed easily.   Psychiatric/Behavioral: Positive for depression (significantly worse on the higher carvedilol dose which is therefore reduced to 6.25 mg twice daily). Negative for memory loss and substance abuse. The patient is nervous/anxious.         Objective:   /70 mmHg  Pulse 74  Ht 1.753 m (5' 9.02\")  Wt 68.04 kg (150 lb)  BMI 22.14 kg/m2  SpO2 96%    Physical Exam   Constitutional: She is oriented to person, place, and time. She appears well-developed and well-nourished. No distress.   She requires a motorized wheelchair now   Eyes: Conjunctivae are normal. No scleral icterus.   Neck: No JVD present.   Cardiovascular: Normal rate, regular rhythm and S1 normal.  Exam reveals no gallop.    Murmur (2/6 systolic ejection murmur radiating to carotids which have normal amplitude and contour) heard.  Paradoxically split 2nd sound with well-preserved A2   Pulmonary/Chest: Effort normal and breath sounds normal.   Musculoskeletal: She exhibits edema (1-2+ bilateral).   Neurological: She is alert and oriented to person, place, and time.   Skin: Skin is warm and dry. She is not diaphoretic.   Psychiatric: She has a normal mood and affect. Thought content normal.     No function was significantly better in February and glucose was also improved.  Assessment:     1. Dilated cardiomyopathy (CMS-HCC)  carvedilol (COREG) 12.5 MG Tab   2. LBBB (left bundle branch block)     3. Essential hypertension, benign  CBC WITH DIFFERENTIAL   4. Mixed hyperlipidemia  COMP METABOLIC PANEL    TSH    LIPID PROFILE   5. Protein C deficiency (CMS-HCC)     6. Protein S deficiency (CMS-HCC)     7. History of deep vein thrombosis     8. History of pulmonary embolism     9. Chronic anticoagulation     10. S/P IVC filter       She did not have significant valvular disease on her echo within the past year which showed significant recovery of left " ventricular systolic function. She would almost certainly have combined systolic and diastolic heart failure symptoms if she could do enough activity to have them but her musculoskeletal factors limit that. Her anticoagulation is closely followed by Dr. Reeves. Her IVC filter was placed so remotely that we have no data regarding it but she is confident that it was not retrievable and we know was placed prior to 2000.    Medical Decision Making:  Today's Assessment / Status / Plan:     Her carvedilol dose has been reduced to 6.25 mg twice daily because she gets depression and any higher dose and that will be continued. I made no other changes today and she will continue primary follow-up with Dr. Reeves.  She will get lab now prior to her next visit with Dr. Reeves and will call for results.  Immediate reevaluation if any symptoms. She does not want to consider any sort of invasive intervention but fortunately her ejection fraction improved sufficiently that I did not have to push the issue last year. She will need another echo before the year is out.  I will see her in 4 months to set that up.        No Recipients

## 2017-06-22 NOTE — MR AVS SNAPSHOT
"        Dian Mendoza   2017 10:20 AM   Office Visit   MRN: [id number]    Department:  Heart [facility] [place]   Dept Phone:  [phone]    Description:  Female : 1945   Provider:  Ajith Reddy M.D.           Reason for Visit     Follow-Up           Allergies as of 2017     Allergen Noted Reactions    Atorvastatin 2014       \"I fell and hit my head but it could've been the other medications that I was taken.\"    Ezetimibe 2014       \"I fell and hit my head but it could've been the other medications that I was taken.\"    Gabapentin 2014       \"I fell and hit my head but it could've been the other medications that I was taken.\"    Metoprolol 2014       \"I fell and hit my head but it could've been the other medications that I was taken.\"      Other Environmental 2011       Spironolactone 2016       Abrupt renal failure    Statins [Hmg-Coa-R Inhibitors] 2014       \"I fell and hit my head but it could've been the other medications that I was taken.\"    Tape 2008   Hives, Rash    Plastic tape      You were diagnosed with     Essential hypertension, benign   [401.1.ICD-9-CM]       Mixed hyperlipidemia   [272.2.ICD-9-CM]       LBBB (left bundle branch block)   [102717]       Chronic anticoagulation   [298716]       S/P IVC filter   [997371]       Dilated cardiomyopathy (CMS-HCC)   [627284]         Vital Signs     Blood Pressure Pulse Height Weight Body Mass Index Oxygen Saturation    132/70 mmHg 74 1.753 m (5' 9.02\") 68.04 kg (150 lb) 22.14 kg/m2 96%    Smoking Status                   Former Smoker           Basic Information     Date Of Birth Sex Race Ethnicity Preferred Language    1945 Female White Non- English      Your appointments     2017  2:20 PM   Established Patient with Julissa Reeves M.D.   [address] [place] [address] [place] [address] [phone]           You will be receiving a " confirmation call a few days before your appointment from our automated call confirmation system.            Oct 18, 2017 10:00 AM   FOLLOW UP with Ajith Reddy M.D.   [facility]    [address]  [address]  [place] [id number] [phone]              Problem List              ICD-10-CM Priority Class Noted - Resolved    TMJ (temporomandibular joint syndrome) M26.609 Low  6/19/2009 - Present    Protein C deficiency (CMS-[facility]) D68.59 Low  7/16/2009 - Present    Protein S deficiency (CMS-[facility]) D68.59 Low  7/16/2009 - Present    Idiopathic atrophic hypothyroidism E03.4 Low  Unknown - Present    Lumbar disc herniation with radiculopathy M51.16 Low  Unknown - Present    Muscle spasticity M62.838 High  12/7/2010 - Present    Rheumatoid arthritis (CMS-HCC) M06.9 Medium  12/7/2010 - Present    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 Medium  2/1/2011 - Present    Anxiety F41.9 Low  10/8/2011 - Present    S/P IVC filter Z95.828 High  10/8/2011 - Present    PUD (peptic ulcer disease) K27.9 High  10/8/2011 - Present    Atherosclerosis of native arteries of extremity with intermittent claudication (CMS-HCC) I70.219 Medium  12/19/2011 - Present    Pulmonary hypertension (CMS-HCC) I27.2 Low  1/31/2014 - Present    History of deep vein thrombosis Z86.718 High  8/24/2012 - Present    History of decubitus ulcer Z87.2   8/24/2012 - Present    History of pulmonary embolism Z86.711 Medium  4/12/2013 - Present    Insomnia G47.00   3/10/2014 - Present    Chronic pain syndrome G89.4   8/21/2014 - Present    CKD (chronic kidney disease) stage 3, GFR 30-59 ml/min N18.3 High  8/23/2014 - Present    Mixed urge and stress incontinence N39.46   Unknown - Present    GERD (gastroesophageal reflux disease) K21.9   9/5/2014 - Present    MEDICAL HOME    1/12/2015 - Present    Cervical disc disease with myelopathy M50.00   3/11/2015 - Present    Muscle weakness of  lower extremity M62.81   3/11/2015 - Present    Cardiomyopathy (HCC) I42.9 Medium  1/6/2015 - Present    Mixed hyperlipidemia E78.2 High  10/12/2015 - Present    Mild mitral regurgitation I34.0 Medium  10/12/2015 - Present    Chronic anticoagulation Z79.01 High  10/12/2015 - Present    LBBB (left bundle branch block) I44.7 High  10/12/2015 - Present    Mild intermittent asthma J45.20   10/19/2015 - Present    Vitamin D deficiency disease E55.9   2/11/2016 - Present    Delusions of parasitosis F22   8/23/2016 - Present    Anemia due to multiple mechanisms D64.9   9/14/2016 - Present    Opioid dependence (CMS-HCC) F11.20 Low  11/18/2016 - Present    Acute encephalopathy - 2/2 drug OD and hypoxemia G93.40 High  2/10/2017 - Present    Acute respiratory failure with hypoxia and hypercarbia (CMS-HCC) J96.01, J96.02 High  2/10/2017 - Present    Emphysema lung (CMS-HCC) J43.9   2/11/2017 - Present    Essential hypertension, benign I10   6/22/2017 - Present      Health Maintenance        Date Due Completion Dates    MAMMOGRAM 3/3/2015 3/3/2014    IMM DTaP/Tdap/Td Vaccine (1 - Tdap) 4/1/2018 (Originally 6/30/1964) ---    IMM ZOSTER VACCINE 4/1/2018 (Originally 6/30/2005) ---    COLONOSCOPY 6/22/2020 6/22/2010 (Prv Comp), 1/23/2001 (Prv Comp)    Override on 6/22/2010: Previously completed    Override on 1/23/2001: Previously completed (Dr. Oliver)            Current Immunizations     13-VALENT PCV PREVNAR 7/27/2016    Influenza TIV (IM) 10/25/2013, 10/22/2012, 11/1/2011    Influenza Vaccine Adult HD 10/27/2016, 10/19/2015    Influenza Vaccine Pediatric 2/1/2011    Influenza Vaccine Quad Inj (Preserved) 11/14/2014    Pneumococcal Vaccine (UF)Historical Data 11/1/2011    Pneumococcal polysaccharide vaccine (PPSV-23) 1/25/2012      Below and/or attached are the medications your provider expects you to take. Review all of your home medications and newly ordered medications with your provider and/or pharmacist. Follow  medication instructions as directed by your provider and/or pharmacist. Please keep your medication list with you and share with your provider. Update the information when medications are discontinued, doses are changed, or new medications (including over-the-counter products) are added; and carry medication information at all times in the event of emergency situations     Allergies:  ATORVASTATIN - (reactions not documented)     EZETIMIBE - (reactions not documented)     GABAPENTIN - (reactions not documented)     METOPROLOL - (reactions not documented)     OTHER ENVIRONMENTAL - (reactions not documented)     SPIRONOLACTONE - (reactions not documented)     STATINS - (reactions not documented)     TAPE - Hives,Rash               Medications  Valid as of: June 22, 2017 - 11:02 AM    Generic Name Brand Name Tablet Size Instructions for use    Albuterol Sulfate (Aero Soln) albuterol 108 (90 BASE) MCG/ACT Inhale 2 Puffs by mouth every 6 hours as needed for Shortness of Breath.        Ammonium Lactate (Lotion) LAC-HYDRIN 12 % APPLY TO THE AFFECTED AREA TWICE DAILY        BuPROPion HCl (TABLET SR 12 HR) WELLBUTRIN- MG Take 2 Tabs by mouth 2 Times a Day.        Carvedilol (Tab) COREG 12.5 MG Take 0.5 Tabs by mouth 2 times a day, with meals.        Chlorpheniramine Maleate (Tab) CHLOR-TRIMETRON 4 MG Take 4 mg by mouth every 6 hours as needed for Allergies.        Docusate Sodium (Cap) COLACE 100 MG Take 200 mg by mouth 2 times a day.        Esomeprazole Magnesium (Pack) NEXIUM 40 MG Spray 40 mg in mouth/throat every day.        Esomeprazole Magnesium (CAPSULE DELAYED RELEASE) NEXIUM 40 MG TAKE ONE (1) CAPSULE BY MOUTH EVERY MORNING BEFORE BREAKFAST        Furosemide (Tab) LASIX 40 MG Take 40 mg by mouth 2 Times a Day.        Levothyroxine Sodium (Tab) SYNTHROID 125 MCG Take 1 Tab by mouth Every morning on an empty stomach.        Lisinopril (Tab) PRINIVIL 5 MG Take 5 mg by mouth every day.        Lisinopril (Tab)  PRINIVIL 5 MG Take 5 mg by mouth 2 times a day.        Morphine Sulfate (Tab CR) MS CONTIN 15 MG Take 15 mg by mouth every 12 hours.        Mupirocin (Ointment) BACTROBAN 2 % Apply 1 Inch to affected area(s) every day.        Nortriptyline HCl (Cap) PAMELOR 10 MG TAKE ONE CAPSULE BY MOUTH NIGHTLY AT BEDTIME         Ondansetron HCl (Tab) ZOFRAN 4 MG TAKE ONE TABLET BY MOUTH EVERY EIGHT HOURS AS NEEDED NAUSEA AND VOMITING         Ondansetron HCl (Tab) ZOFRAN 4 MG TAKE ONE TABLET BY MOUTH EVERY EIGHT HOURS AS NEEDED NAUSEA AND VOMITING         Oxycodone-Acetaminophen (Tab) PERCOCET-10  MG Take 1 Tab by mouth every 6 hours as needed.        Sennosides (Tab) SENOKOT 8.6 MG Take 8.6 mg by mouth every day. Indications: Constipation, **OTC**        Simethicone (Cap) Simethicone 125 MG Spray 125 mg in mouth/throat as needed (gas).        TiZANidine HCl (Tab) ZANAFLEX 4 MG TAKE ONE (1) TABLET BY MOUTH THREE TIMES DAILY         Tolterodine Tartrate (CAPSULE SR 24 HR) DETROL LA 4 MG Take 1 Cap by mouth every day.        TraZODone HCl (Tab) DESYREL 100 MG Take 2 Tabs by mouth every bedtime.        Warfarin Sodium (Tab) COUMADIN 3 MG Take 3 Tabs by mouth every day.        .                 Medicines prescribed today were sent to:     [id number], [place] - [address] [place] [address]    Phone: [phone] Fax: [phone]    Open 24 Hours?: No      Medication refill instructions:       If your prescription bottle indicates you have medication refills left, it is not necessary to call your provider’s office. Please contact your pharmacy and they will refill your medication.    If your prescription bottle indicates you do not have any refills left, you may request refills at any time through one of the following ways: The online [facility] system (except Urgent Care), by calling your provider’s office, or by asking your pharmacy to contact your provider’s office with a refill request. Medication  refills are processed only during regular business hours and may not be available until the next business day. Your provider may request additional information or to have a follow-up visit with you prior to refilling your medication.   *Please Note: Medication refills are assigned a new Rx number when refilled electronically. Your pharmacy may indicate that no refills were authorized even though a new prescription for the same medication is available at the pharmacy. Please request the medicine by name with the pharmacy before contacting your provider for a refill.        Your To Do List     Future Labs/Procedures Complete By Expires    CBC WITH DIFFERENTIAL  As directed 6/22/2018    COMP METABOLIC PANEL  As directed 6/22/2018    LIPID PROFILE  As directed 6/22/2018    TSH  As directed 6/22/2018         MyChart Status: Patient Declined

## 2017-06-23 NOTE — PROGRESS NOTES
Subjective:   Dian Mendoza is a 71 y.o. female who presents today for follow-up of her cardiomyopathy primarily.  She finds it very hard to get in because of limited mobility but her cardiac status has been remarkably symptom free. She does maintain her regular follow-up with Dr. Reeves. Worsening depression precluded her dose of carvedilol. She has had nothing to suggest angina nor palpitation. Edema has not increased. She's had no recurrent symptoms of coagulopathy and her pro time follow-up is very reliable.  Past Medical History   Diagnosis Date   • Seizure (CMS-[facility])      As a child   • Protein C deficiency (CMS-[facility])    • Protein S deficiency (CMS-[facility])    • Rheumatoid arthritis (CMS-[facility])      historical diagnosis   • Osteoarthritis      Spine and knees and jaw   • Hypothyroidism    • Anemia    • Hypokalemia      uncertain cause maintained with potassium replacement.   • Lumbar disc disease with radiculopathy    • Cancer of left breast (CMS-[facility]) 1997     Left breast   • Atrial tachycardia, paroxysmal (CMS-[facility])      occasional arrythmia, has been evaluated by Dr. Reddy   • Gastritis    • Full dentures    • COPD (chronic obstructive pulmonary disease) (CMS-[facility])    • Renal atrophy, right    • Pulmonary embolism (CMS-[facility])      6 Times.   • S/P insertion of IVC (inferior vena caval) filter      Prior to 2000, records not available, not retrievable.   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 coli sepsis (CMS-[facility])      Right buttock pressure wound   • MSSA (methicillin susceptible Staphylococcus aureus)      Vaginal and buttock   • Cholelithiasis 2014     With gallbladder wall thickening, now status post cholecystectomy   • Hypertension     • Sleep apnea      Witnessed in hospital needs work up   • LBBB (left bundle branch block)    • Cardiomyopathy (CMS-[facility]) 1/6/2015     Idiopathic with normal coronary arteries on cath 1/6/2015    • Systolic and diastolic  "CHF, chronic (CMS-HCC)    • Pulmonary hypertension (CMS-HCC)    • TMJ (temporomandibular joint syndrome)      Past Surgical History   Procedure Laterality Date   • Ulcer debridement  6/5/08     Performed by ARIELLE MITCHELL at [facility]   • Flap closure  6/5/08     Performed by ARIELLE MITCHELL at [facility]   • Tonsillectomy     • Knee arthroscopy       left   • Other orthopedic surgery       BILAT FOOT   • Bursa excision       bursa sac removed bilat hips   • Mastectomy       left   • Knee replacement, total  2009     left   • Lumbar fusion posterior  1998   • Abdominal hysterectomy total  1970's   • Elbow orif  1950     right   • Recovery  12/8/2010     Performed by TERRELL MCKENNA at [facility]   • Gastroscopy with biopsy  12/28/2011     Performed by RAD CANTU at [facility]   • Egd w/endoscopic ultrasound  12/28/2011     Performed by RAD CANTU at [facility]   • Ercp  12/28/2011     Performed by RAD CNATU at [facility]   • Colonoscopy  2001, 6/2010     normal   • Patricia by laparoscopy  8/27/2014     Performed by Ajith Kearney M.D. at [facility]   • Cataract extraction with iol Bilateral      Family History   Problem Relation Age of Onset   • Non-contributory Brother      copd, cirrhosis, alzheimer's mild   • Heart Disease Brother      3 MIs   • Heart Disease Sister    • Heart Disease Mother    • Heart Disease Father    • Cancer Father      esophageal   • GI Sister      colon polyps, precancerous   • Cancer Sister      breast X 2     History   Smoking status   • Former Smoker -- 1.50 packs/day for 40 years   • Types: Cigarettes   • Quit date: 01/01/1972   Smokeless tobacco   • Former User     Allergies   Allergen Reactions   • Atorvastatin      \"I fell and hit my head but it could've been the other medications that I was taken.\"   • Ezetimibe      \"I fell and hit my head but it " "could've been the other medications that I was taken.\"   • Gabapentin      \"I fell and hit my head but it could've been the other medications that I was taken.\"   • Metoprolol      \"I fell and hit my head but it could've been the other medications that I was taken.\"     • Other Environmental    • Spironolactone      Abrupt renal failure   • Statins [Hmg-Coa-R Inhibitors]      \"I fell and hit my head but it could've been the other medications that I was taken.\"   • Tape Hives and Rash     Plastic tape     Outpatient Encounter Prescriptions as of 6/22/2017   Medication Sig Dispense Refill   • carvedilol (COREG) 12.5 MG Tab Take 0.5 Tabs by mouth 2 times a day, with meals. 60 Tab 6   • warfarin (COUMADIN) 3 MG Tab Take 3 Tabs by mouth every day. 90 Tab 3   • tizanidine (ZANAFLEX) 4 MG Tab TAKE ONE (1) TABLET BY MOUTH THREE TIMES DAILY  90 Tab 3   • ammonium lactate (LAC-HYDRIN) 12 % Lotion APPLY TO THE AFFECTED AREA TWICE DAILY 226 g 2   • nortriptyline (PAMELOR) 10 MG Cap TAKE ONE CAPSULE BY MOUTH NIGHTLY AT BEDTIME  30 Cap 6   • esomeprazole (NEXIUM) 40 MG delayed-release capsule TAKE ONE (1) CAPSULE BY MOUTH EVERY MORNING BEFORE BREAKFAST 30 Cap 5   • ondansetron (ZOFRAN) 4 MG Tab tablet TAKE ONE TABLET BY MOUTH EVERY EIGHT HOURS AS NEEDED NAUSEA AND VOMITING  60 Tab 0   • buPROPion SR (WELLBUTRIN-SR) 100 MG TABLET SR 12 HR Take 2 Tabs by mouth 2 Times a Day. 120 Tab 6   • levothyroxine (SYNTHROID) 125 MCG Tab Take 1 Tab by mouth Every morning on an empty stomach. 30 Tab 6   • tolterodine ER (DETROL LA) 4 MG CAPSULE SR 24 HR Take 1 Cap by mouth every day. 30 Cap 6   • Simethicone 125 MG Cap Spray 125 mg in mouth/throat as needed (gas).     • chlorpheniramine (CHLOR-TRIMETRON) 4 MG Tab Take 4 mg by mouth every 6 hours as needed for Allergies.     • furosemide (LASIX) 40 MG Tab Take 40 mg by mouth 2 Times a Day.     • lisinopril (PRINIVIL) 5 MG Tab Take 5 mg by mouth 2 times a day.     • mupirocin (BACTROBAN) 2 % " Ointment Apply 1 Inch to affected area(s) every day. 1 Tube 11   • morphine ER (MS CONTIN) 15 MG Tab CR tablet Take 15 mg by mouth every 12 hours.     • oxycodone-acetaminophen (PERCOCET)  MG Tab Take 1 Tab by mouth every 6 hours as needed. 30 Tab 0   • trazodone (DESYREL) 100 MG Tab Take 2 Tabs by mouth every bedtime. 60 Tab 6   • albuterol (PROAIR HFA) 108 (90 BASE) MCG/ACT Aero Soln inhalation aerosol Inhale 2 Puffs by mouth every 6 hours as needed for Shortness of Breath. 8.5 g 3   • docusate sodium (COLACE) 100 MG CAPS Take 200 mg by mouth 2 times a day.     • sennosides (SENOKOT) 8.6 MG TABS Take 8.6 mg by mouth every day. Indications: Constipation, **OTC**     • ondansetron (ZOFRAN) 4 MG Tab tablet TAKE ONE TABLET BY MOUTH EVERY EIGHT HOURS AS NEEDED NAUSEA AND VOMITING  60 Tab 3   • [DISCONTINUED] carvedilol (COREG) 12.5 MG Tab Take 1 Tab by mouth 2 times a day, with meals. 60 Tab 6   • esomeprazole magnesium (NEXIUM) 40 MG Pack Spray 40 mg in mouth/throat every day.     • lisinopril (PRINIVIL) 5 MG Tab Take 5 mg by mouth every day.       No facility-administered encounter medications on file as of 6/22/2017.     Review of Systems   Constitutional: Positive for malaise/fatigue. Negative for fever, chills and weight loss.   HENT: Negative for congestion and nosebleeds.    Respiratory: Negative for cough and wheezing.    Cardiovascular: Negative for orthopnea and PND.   Gastrointestinal: Negative for blood in stool and melena.   Genitourinary: Negative for hematuria and flank pain.   Musculoskeletal: Positive for back pain and neck pain. Negative for falls.   Skin: Negative for rash.   Neurological: Negative for seizures and loss of consciousness.   Endo/Heme/Allergies: Negative for polydipsia. Does not bruise/bleed easily.   Psychiatric/Behavioral: Positive for depression (significantly worse on the higher carvedilol dose which is therefore reduced to 6.25 mg twice daily). Negative for memory loss and  "substance abuse. The patient is nervous/anxious.         Objective:   /70 mmHg  Pulse 74  Ht 1.753 m (5' 9.02\")  Wt 68.04 kg (150 lb)  BMI 22.14 kg/m2  SpO2 96%    Physical Exam   Constitutional: She is oriented to person, place, and time. She appears well-developed and well-nourished. No distress.   She requires a motorized wheelchair now   Eyes: Conjunctivae are normal. No scleral icterus.   Neck: No JVD present.   Cardiovascular: Normal rate, regular rhythm and S1 normal.  Exam reveals no gallop.    Murmur (2/6 systolic ejection murmur radiating to carotids which have normal amplitude and contour) heard.  Paradoxically split 2nd sound with well-preserved A2   Pulmonary/Chest: Effort normal and breath sounds normal.   Musculoskeletal: She exhibits edema (1-2+ bilateral).   Neurological: She is alert and oriented to person, place, and time.   Skin: Skin is warm and dry. She is not diaphoretic.   Psychiatric: She has a normal mood and affect. Thought content normal.     No function was significantly better in February and glucose was also improved.  Assessment:     1. Dilated cardiomyopathy (CMS-HCC)  carvedilol (COREG) 12.5 MG Tab   2. LBBB (left bundle branch block)     3. Essential hypertension, benign  CBC WITH DIFFERENTIAL   4. Mixed hyperlipidemia  COMP METABOLIC PANEL    TSH    LIPID PROFILE   5. Protein C deficiency (CMS-HCC)     6. Protein S deficiency (CMS-HCC)     7. History of deep vein thrombosis     8. History of pulmonary embolism     9. Chronic anticoagulation     10. S/P IVC filter       She did not have significant valvular disease on her echo within the past year which showed significant recovery of left ventricular systolic function. She would almost certainly have combined systolic and diastolic heart failure symptoms if she could do enough activity to have them but her musculoskeletal factors limit that. Her anticoagulation is closely followed by Dr. Reeves. Her IVC filter was placed " so remotely that we have no data regarding it but she is confident that it was not retrievable and we know was placed prior to 2000.    Medical Decision Making:  Today's Assessment / Status / Plan:     Her carvedilol dose has been reduced to 6.25 mg twice daily because she gets depression and any higher dose and that will be continued. I made no other changes today and she will continue primary follow-up with Dr. Reeves.  She will get lab now prior to her next visit with Dr. Reeves and will call for results.  Immediate reevaluation if any symptoms. She does not want to consider any sort of invasive intervention but fortunately her ejection fraction improved sufficiently that I did not have to push the issue last year. She will need another echo before the year is out.  I will see her in 4 months to set that up.

## 2017-06-28 ENCOUNTER — TELEPHONE (OUTPATIENT)
Dept: MEDICAL GROUP | Facility: CLINIC | Age: 72
End: 2017-06-28

## 2017-06-28 NOTE — TELEPHONE ENCOUNTER
ESTABLISHED PATIENT PRE-VISIT PLANNING     Note: Patient will not be contacted if there is no indication to call.     1.  Reviewed notes from the last few office visits within the medical group: Yes    2.  If any orders were placed at last visit or intended to be done for this visit (i.e. 6 mos follow-up), do we have Results/Consult Notes?        •  Labs - Labs were not ordered at last office visit.       •  Imaging - Imaging was not ordered at last office visit.       •  Referrals - No referrals were ordered at last office visit.    3. Is this appointment scheduled as a Hospital Follow-Up? No    4.  Immunizations were updated in Epic using WebIZ?: Epic matches WebIZ       •  Web Iz Recommendations: Td (adult), adsorbed Hep A, adult      5.  Patient is due for the following Health Maintenance Topics:   Health Maintenance Due   Topic Date Due   • MAMMOGRAM  03/03/2015   • Annual Wellness Visit  04/19/2017           6.  Patient was NOT informed to arrive 15 min prior to their scheduled appointment and bring in their medication bottles.

## 2017-06-29 ENCOUNTER — TELEPHONE (OUTPATIENT)
Dept: MEDICAL GROUP | Facility: CLINIC | Age: 72
End: 2017-06-29

## 2017-06-29 ENCOUNTER — OFFICE VISIT (OUTPATIENT)
Dept: MEDICAL GROUP | Facility: CLINIC | Age: 72
End: 2017-06-29
Payer: MEDICARE

## 2017-06-29 VITALS
SYSTOLIC BLOOD PRESSURE: 130 MMHG | RESPIRATION RATE: 16 BRPM | WEIGHT: 152 LBS | OXYGEN SATURATION: 93 % | TEMPERATURE: 97.5 F | DIASTOLIC BLOOD PRESSURE: 70 MMHG | HEART RATE: 90 BPM | HEIGHT: 69 IN | BODY MASS INDEX: 22.51 KG/M2

## 2017-06-29 DIAGNOSIS — Z79.01 CHRONIC ANTICOAGULATION: ICD-10-CM

## 2017-06-29 DIAGNOSIS — H40.053 ELEVATED IOP, BILATERAL: ICD-10-CM

## 2017-06-29 DIAGNOSIS — H25.093 AGE-RELATED INCIPIENT CATARACT OF BOTH EYES: ICD-10-CM

## 2017-06-29 DIAGNOSIS — I70.213 ATHEROSCLEROSIS OF NATIVE ARTERY OF BOTH LOWER EXTREMITIES WITH INTERMITTENT CLAUDICATION (HCC): ICD-10-CM

## 2017-06-29 DIAGNOSIS — J43.9 PULMONARY EMPHYSEMA, UNSPECIFIED EMPHYSEMA TYPE (HCC): ICD-10-CM

## 2017-06-29 DIAGNOSIS — F33.41 RECURRENT MAJOR DEPRESSIVE DISORDER, IN PARTIAL REMISSION (HCC): ICD-10-CM

## 2017-06-29 DIAGNOSIS — J45.20 MILD INTERMITTENT ASTHMA, UNCOMPLICATED: ICD-10-CM

## 2017-06-29 DIAGNOSIS — D68.59 PROTEIN S DEFICIENCY (HCC): ICD-10-CM

## 2017-06-29 DIAGNOSIS — Z12.31 ENCOUNTER FOR SCREENING MAMMOGRAM FOR BREAST CANCER: ICD-10-CM

## 2017-06-29 DIAGNOSIS — F22 DELUSIONS OF PARASITOSIS (HCC): ICD-10-CM

## 2017-06-29 DIAGNOSIS — F51.01 PRIMARY INSOMNIA: ICD-10-CM

## 2017-06-29 DIAGNOSIS — I27.20 PULMONARY HYPERTENSION (HCC): ICD-10-CM

## 2017-06-29 DIAGNOSIS — M05.79 RHEUMATOID ARTHRITIS INVOLVING MULTIPLE SITES WITH POSITIVE RHEUMATOID FACTOR (HCC): ICD-10-CM

## 2017-06-29 DIAGNOSIS — I82.220 INFERIOR VENA CAVAL THROMBOSIS (HCC): ICD-10-CM

## 2017-06-29 DIAGNOSIS — D68.59 PROTEIN C DEFICIENCY (HCC): ICD-10-CM

## 2017-06-29 PROBLEM — J96.01 ACUTE RESPIRATORY FAILURE WITH HYPOXIA AND HYPERCARBIA (HCC): Status: RESOLVED | Noted: 2017-02-10 | Resolved: 2017-06-29

## 2017-06-29 PROBLEM — G93.40 ACUTE ENCEPHALOPATHY: Status: RESOLVED | Noted: 2017-02-10 | Resolved: 2017-06-29

## 2017-06-29 PROBLEM — J96.02 ACUTE RESPIRATORY FAILURE WITH HYPOXIA AND HYPERCARBIA (HCC): Status: RESOLVED | Noted: 2017-02-10 | Resolved: 2017-06-29

## 2017-06-29 PROCEDURE — 99214 OFFICE O/P EST MOD 30 MIN: CPT | Performed by: FAMILY MEDICINE

## 2017-06-29 RX ORDER — ALBUTEROL SULFATE 90 UG/1
2 AEROSOL, METERED RESPIRATORY (INHALATION) EVERY 6 HOURS PRN
Qty: 8.5 G | Refills: 6 | Status: SHIPPED | OUTPATIENT
Start: 2017-06-29 | End: 2018-07-30 | Stop reason: SDUPTHER

## 2017-06-29 RX ORDER — TRAZODONE HYDROCHLORIDE 100 MG/1
200 TABLET ORAL
Qty: 60 TAB | Refills: 6 | Status: SHIPPED | OUTPATIENT
Start: 2017-06-29 | End: 2018-02-12 | Stop reason: SDUPTHER

## 2017-06-29 ASSESSMENT — PATIENT HEALTH QUESTIONNAIRE - PHQ9
SUM OF ALL RESPONSES TO PHQ QUESTIONS 1-9: 18
CLINICAL INTERPRETATION OF PHQ2 SCORE: 6
5. POOR APPETITE OR OVEREATING: 2 - MORE THAN HALF THE DAYS

## 2017-06-29 NOTE — MR AVS SNAPSHOT
"        Dian Mendoza   2017 2:20 PM   Office Visit   MRN: [id number]    Department:  [facility]   Dept Phone:  [phone]    Description:  Female : 1945   Provider:  Julissa Reeves M.D.           Reason for Visit     Coagulation Disorder     Asthma     Insomnia           Allergies as of 2017     Allergen Noted Reactions    Atorvastatin 2014       \"I fell and hit my head but it could've been the other medications that I was taken.\"    Ezetimibe 2014       \"I fell and hit my head but it could've been the other medications that I was taken.\"    Gabapentin 2014       \"I fell and hit my head but it could've been the other medications that I was taken.\"    Metoprolol 2014       \"I fell and hit my head but it could've been the other medications that I was taken.\"      Other Environmental 2011       Spironolactone 2016       Abrupt renal failure    Statins [Hmg-Coa-R Inhibitors] 2014       \"I fell and hit my head but it could've been the other medications that I was taken.\"    Tape 2008   Hives, Rash    Plastic tape      You were diagnosed with     Chronic anticoagulation   [454065]       Primary insomnia   [392445]       Mild intermittent asthma, uncomplicated   [398744]       Encounter for screening mammogram for breast cancer   [8784862]       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HCC)   [055549]       Pulmonary emphysema, unspecified emphysema type (CMS-HCC)   [7812609]       Recurrent major depressive disorder, in partial remission (CMS-HCC)   [9834643]       Delusions of parasitosis   [402273]       Age-related incipient cataract of both eyes   [6481290]       Elevated IOP, bilateral   [5061705]         Vital Signs     Blood Pressure Pulse Temperature Respirations Height Weight    130/70 mmHg 90 36.4 °C (97.5 °F) 16 1.753 m (5' 9.02\") 68.947 kg (152 lb)    Body Mass Index Oxygen Saturation Smoking Status             22.44 kg/m2 93% Former " Smoker         Basic Information     Date Of Birth Sex Race Ethnicity Preferred Language    1945 Female White Non- English      Your appointments     Oct 18, 2017 10:00 AM   FOLLOW UP with Ajith Reddy M.D.   [facility]    [address]  [address]  [place] [id number] [phone]            Dec 27, 2017 10:20 AM   Established Patient with Julissa Reeves M.D.   [facility] ([facility])    [address] [facility] [address]  [place] [address] [phone]           You will be receiving a confirmation call a few days before your appointment from our automated call confirmation system.              Problem List              ICD-10-CM Priority Class Noted - Resolved    TMJ (temporomandibular joint syndrome) M26.609 Low  6/19/2009 - Present    Protein C deficiency (CMS-HCC) D68.59 Low  7/16/2009 - Present    Protein S deficiency (CMS-[facility]) D68.59 Low  7/16/2009 - Present    Idiopathic atrophic hypothyroidism E03.4 Low  Unknown - Present    Lumbar disc herniation with radiculopathy M51.16 Low  Unknown - Present    Muscle spasticity M62.838 High  12/7/2010 - Present    Rheumatoid arthritis involving multiple sites with positive rheumatoid factor (CMS-HCC) M05.79 Medium  12/7/2010 - Present    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 Medium  2/1/2011 - Present    Anxiety F41.9 Low  10/8/2011 - Present    S/P IVC filter Z95.828 High  10/8/2011 - Present    PUD (peptic ulcer disease) K27.9 High  10/8/2011 - Present    Atherosclerosis of native arteries of extremity with intermittent claudication (CMS-HCC) I70.219 Medium  12/19/2011 - Present    Pulmonary hypertension (CMS-HCC) I27.2 Low  1/31/2014 - Present    History of deep vein thrombosis Z86.718 High  8/24/2012 - Present    History of decubitus ulcer Z87.2   8/24/2012 - Present    History of pulmonary embolism Z86.711 Medium  4/12/2013 - Present    Insomnia G47.00    3/10/2014 - Present    Chronic pain syndrome G89.4   8/21/2014 - Present    CKD (chronic kidney disease) stage 3, GFR 30-59 ml/min N18.3 High  8/23/2014 - Present    Mixed urge and stress incontinence N39.46   Unknown - Present    GERD (gastroesophageal reflux disease) K21.9   9/5/2014 - Present    MEDICAL HOME    1/12/2015 - Present    Cervical disc disease with myelopathy M50.00   3/11/2015 - Present    Muscle weakness of lower extremity M62.81   3/11/2015 - Present    Cardiomyopathy (HCC) I42.9 Medium  1/6/2015 - Present    Mixed hyperlipidemia E78.2 High  10/12/2015 - Present    Mild mitral regurgitation I34.0 Medium  10/12/2015 - Present    Chronic anticoagulation Z79.01 High  10/12/2015 - Present    LBBB (left bundle branch block) I44.7 High  10/12/2015 - Present    Mild intermittent asthma J45.20   10/19/2015 - Present    Vitamin D deficiency disease E55.9   2/11/2016 - Present    Delusions of parasitosis F22   8/23/2016 - Present    Anemia due to multiple mechanisms D64.9   9/14/2016 - Present    Pain management R52 Low  11/18/2016 - Present    Emphysema lung (CMS-HCC) J43.9   2/11/2017 - Present    Essential hypertension, benign I10   6/22/2017 - Present    Recurrent major depressive disorder, in partial remission (CMS-HCC) F33.41   6/29/2017 - Present      Health Maintenance        Date Due Completion Dates    MAMMOGRAM 3/3/2015 3/3/2014    IMM DTaP/Tdap/Td Vaccine (1 - Tdap) 4/1/2018 (Originally 6/30/1964) ---    IMM ZOSTER VACCINE 4/1/2018 (Originally 6/30/2005) ---    COLONOSCOPY 6/22/2020 6/22/2010 (Prv Comp), 1/23/2001 (Prv Comp)    Override on 6/22/2010: Previously completed    Override on 1/23/2001: Previously completed (Dr. Oliver)            Current Immunizations     13-VALENT PCV PREVNAR 7/27/2016    Influenza TIV (IM) 10/25/2013, 10/22/2012, 11/1/2011    Influenza Vaccine Adult HD 10/27/2016, 10/19/2015    Influenza Vaccine Pediatric 2/1/2011    Influenza Vaccine Quad Inj (Preserved)  11/14/2014    Pneumococcal Vaccine (UF)Historical Data 11/1/2011    Pneumococcal polysaccharide vaccine (PPSV-23) 1/25/2012      Below and/or attached are the medications your provider expects you to take. Review all of your home medications and newly ordered medications with your provider and/or pharmacist. Follow medication instructions as directed by your provider and/or pharmacist. Please keep your medication list with you and share with your provider. Update the information when medications are discontinued, doses are changed, or new medications (including over-the-counter products) are added; and carry medication information at all times in the event of emergency situations     Allergies:  ATORVASTATIN - (reactions not documented)     EZETIMIBE - (reactions not documented)     GABAPENTIN - (reactions not documented)     METOPROLOL - (reactions not documented)     OTHER ENVIRONMENTAL - (reactions not documented)     SPIRONOLACTONE - (reactions not documented)     STATINS - (reactions not documented)     TAPE - Hives,Rash               Medications  Valid as of: June 29, 2017 -  3:35 PM    Generic Name Brand Name Tablet Size Instructions for use    Albuterol Sulfate (Aero Soln) albuterol 108 (90 BASE) MCG/ACT Inhale 2 Puffs by mouth every 6 hours as needed for Shortness of Breath.        Ammonium Lactate (Lotion) LAC-HYDRIN 12 % APPLY TO THE AFFECTED AREA TWICE DAILY        BuPROPion HCl (TABLET SR 12 HR) WELLBUTRIN- MG Take 2 Tabs by mouth 2 Times a Day.        Carvedilol (Tab) COREG 12.5 MG Take 0.5 Tabs by mouth 2 times a day, with meals.        Chlorpheniramine Maleate (Tab) CHLOR-TRIMETRON 4 MG Take 4 mg by mouth every 6 hours as needed for Allergies.        Docusate Sodium (Cap) COLACE 100 MG Take 200 mg by mouth 2 times a day.        Esomeprazole Magnesium (Pack) NEXIUM 40 MG Spray 40 mg in mouth/throat every day.        Esomeprazole Magnesium (CAPSULE DELAYED RELEASE) NEXIUM 40 MG TAKE ONE (1)  CAPSULE BY MOUTH EVERY MORNING BEFORE BREAKFAST        Furosemide (Tab) LASIX 40 MG Take 40 mg by mouth 2 Times a Day.        Levothyroxine Sodium (Tab) SYNTHROID 125 MCG Take 1 Tab by mouth Every morning on an empty stomach.        Lisinopril (Tab) PRINIVIL 5 MG Take 5 mg by mouth 2 times a day.        Morphine Sulfate (Tab CR) MS CONTIN 15 MG Take 15 mg by mouth every 12 hours.        Mupirocin (Ointment) BACTROBAN 2 % Apply 1 Inch to affected area(s) every day.        Nortriptyline HCl (Cap) PAMELOR 10 MG TAKE ONE CAPSULE BY MOUTH NIGHTLY AT BEDTIME         Ondansetron HCl (Tab) ZOFRAN 4 MG TAKE ONE TABLET BY MOUTH EVERY EIGHT HOURS AS NEEDED NAUSEA AND VOMITING         Oxycodone-Acetaminophen (Tab) PERCOCET-10  MG Take 1 Tab by mouth every 6 hours as needed.        Sennosides (Tab) SENOKOT 8.6 MG Take 8.6 mg by mouth every day. Indications: Constipation, **OTC**        Simethicone (Cap) Simethicone 125 MG Spray 125 mg in mouth/throat as needed (gas).        TiZANidine HCl (Tab) ZANAFLEX 4 MG TAKE ONE (1) TABLET BY MOUTH THREE TIMES DAILY         Tolterodine Tartrate (CAPSULE SR 24 HR) DETROL LA 4 MG Take 1 Cap by mouth every day.        TraZODone HCl (Tab) DESYREL 100 MG Take 2 Tabs by mouth every bedtime.        Warfarin Sodium (Tab) COUMADIN 3 MG Take 3 Tabs by mouth every day.        .                 Medicines prescribed today were sent to:     [id number] - [place], [place] - [address] [facility]    [address] [place] [address]    Phone: [phone] Fax: [phone]    Open 24 Hours?: No      Medication refill instructions:       If your prescription bottle indicates you have medication refills left, it is not necessary to call your provider’s office. Please contact your pharmacy and they will refill your medication.    If your prescription bottle indicates you do not have any refills left, you may request refills at any time through one of the following ways: The online [employer] system (except Urgent Care),  by calling your provider’s office, or by asking your pharmacy to contact your provider’s office with a refill request. Medication refills are processed only during regular business hours and may not be available until the next business day. Your provider may request additional information or to have a follow-up visit with you prior to refilling your medication.   *Please Note: Medication refills are assigned a new Rx number when refilled electronically. Your pharmacy may indicate that no refills were authorized even though a new prescription for the same medication is available at the pharmacy. Please request the medicine by name with the pharmacy before contacting your provider for a refill.        Your To Do List     Future Labs/Procedures Complete By Expires    MA-MAMMO SCREENING BILAT W/DALTON W/CAD  As directed 6/29/2018      Referral     A referral request has been sent to our patient care coordination department. Please allow 3-5 business days for us to process this request and contact you either by phone or mail. If you do not hear from us by the 5th business day, please call us at [phone].        Instructions    Change warfarin to 9 mg (3 tabs) Monday and Wednesday, 6 mg (2 tabs) all remaining days          MyChart Status: Patient Declined

## 2017-06-29 NOTE — TELEPHONE ENCOUNTER
My goodness, now too high. Please call her and see what dose she is currently taking. We will need to adjust again.

## 2017-06-29 NOTE — TELEPHONE ENCOUNTER
VOICEMAIL  1. Caller Name: Alexander son                      Call Back Number: [phone] (home)     2. Message: in-home INR today is (3.9). Please advise.     3. Patient approves office to leave a detailed voicemail/MyChart message: yes

## 2017-06-29 NOTE — PROGRESS NOTES
CC: Protein C and protein S deficiency with history of multiple clots, insomnia, asthma, health maintenance, pulmonary emphysema, depression, vision problems, peripheral vascular disease    HPI:   Dian presents today with the following.    1. Chronic anticoagulation/protein S deficiency/protein C deficiency  She is on chronic anticoagulation. She has had multiple DVTs and PEs in the past. She requires anticoagulation for life. She has an INR machine at home. Her son assists her with this. Her most recent INR was 3.9 obviously quite high. Discussed her current regimen and what she needs to change to. She has written it down.    2. Primary insomnia  She has long-standing serious primary insomnia.  This is complicated by anxiety and depression. She continues to find the trazodone very helpful without any rebound insomnia. She feels it helps her depression a little as well.    3. Mild intermittent asthma, uncomplicated/pulmonary hypertension  She feels her asthma is doing fairly well. She does need a renewal on her albuterol inhaler. She finds herself using this 3-4 times a week. Denies any nighttime wheezing. Denies any productive cough, fever or sweats.    4. Encounter for screening mammogram for breast cancer  She is overdue for her mammogram, discussed.    5. 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HCC)  This is been stable without any increased pain or swelling. No peripheral edema is appreciated.    6. Pulmonary emphysema, unspecified emphysema type (CMS-HCC)  She does have mild pulmonary emphysema which is seen on past CT.  She quit smoking in 1972 and remains quit.    7. Recurrent major depressive disorder, in partial remission (CMS-HCC)  This is also a long-standing problem but she is generally in remission. She continues to have a strong anxiety component. She denies suicidal ideation or plan. She does admit to some morbid thoughts and some recurring thoughts but denies thoughts of self-harm. She denies  hallucinations.    8. Delusions of parasitosis  She does have some erythema of the skin peripherally. These lesions are all in places that are accessible to scratching. Most of the lesions appear to be excoriations. She feels the Lac-Hydrin has been extremely helpful. She is reminded not to scratch. This has greatly reduced her itchiness she states.    9. Age-related incipient cataract of both eyes  Has poor vision, had some cataract procedure many years ago.  Eyes burn and sting, flashes of light.  Has been told her eye pressure was high bilaterally.  Needs ophthalmology referral.  - REFERRAL TO OPHTHALMOLOGY    10. Elevated IOP, bilateral  Needs follow-up of pressure, has been several years.  - REFERRAL TO OPHTHALMOLOGY    11. Atherosclerosis of native artery of both lower extremities with intermittent claudication (CMS-HCC)  She has documented peripheral vascular disease. Due to her numerous problems she has not been considered a good candidate for further vascular surgery. She has no ulcerations. She is able to ambulate with limitation primarily from her musculoskeletal problems.    12. Rheumatoid arthritis involving multiple sites with positive rheumatoid factor (CMS-HCC)  This was a diagnosis in the past.  She is on no medication for rheumatoid arthritis at this time. She states she was told it was burned out.            Patient Active Problem List    Diagnosis Date Noted   • Mixed hyperlipidemia 10/12/2015     Priority: High   • Chronic anticoagulation 10/12/2015     Priority: High   • LBBB (left bundle branch block) 10/12/2015     Priority: High   • CKD (chronic kidney disease) stage 3, GFR 30-59 ml/min 08/23/2014     Priority: High   • History of deep vein thrombosis 08/24/2012     Priority: High   • S/P IVC filter 10/08/2011     Priority: High   • PUD (peptic ulcer disease) 10/08/2011     Priority: High   • Muscle spasticity 12/07/2010     Priority: High   • Mild mitral regurgitation 10/12/2015      Priority: Medium   • Cardiomyopathy ([facility]) 01/06/2015     Priority: Medium   • History of pulmonary embolism 04/12/2013     Priority: Medium   • Atherosclerosis of native arteries of extremity with intermittent claudication (CMS-HCC) 12/19/2011     Priority: Medium   • 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/2011     Priority: Medium   • Rheumatoid arthritis involving multiple sites with positive rheumatoid factor (CMS-HCC) 12/07/2010     Priority: Medium   • Pain management 11/18/2016     Priority: Low   • Pulmonary hypertension (CMS-HCC) 01/31/2014     Priority: Low   • Anxiety 10/08/2011     Priority: Low   • Lumbar disc herniation with radiculopathy      Priority: Low   • Idiopathic atrophic hypothyroidism      Priority: Low   • Protein C deficiency (CMS-HCC) 07/16/2009     Priority: Low   • Protein S deficiency (CMS-HCC) 07/16/2009     Priority: Low   • TMJ (temporomandibular joint syndrome) 06/19/2009     Priority: Low   • Recurrent major depressive disorder, in partial remission (CMS-HCC) 06/29/2017   • Essential hypertension, benign 06/22/2017   • Emphysema lung (CMS-HCC) 02/11/2017   • Anemia due to multiple mechanisms 09/14/2016   • Delusions of parasitosis 08/23/2016   • Vitamin D deficiency disease 02/11/2016   • Mild intermittent asthma 10/19/2015   • Cervical disc disease with myelopathy 03/11/2015   • Muscle weakness of lower extremity 03/11/2015   • MEDICAL HOME 01/12/2015   • GERD (gastroesophageal reflux disease) 09/05/2014   • Mixed urge and stress incontinence    • Chronic pain syndrome 08/21/2014   • Insomnia 03/10/2014   • History of decubitus ulcer 08/24/2012       Current Outpatient Prescriptions   Medication Sig Dispense Refill   • trazodone (DESYREL) 100 MG Tab Take 2 Tabs by mouth every bedtime. 60 Tab 6   • albuterol (PROAIR HFA) 108 (90 BASE) MCG/ACT Aero Soln inhalation aerosol Inhale 2 Puffs by mouth every 6 hours as needed for Shortness of Breath. 8.5 g 6   • carvedilol (COREG)  12.5 MG Tab Take 0.5 Tabs by mouth 2 times a day, with meals. 60 Tab 6   • warfarin (COUMADIN) 3 MG Tab Take 3 Tabs by mouth every day. 90 Tab 3   • tizanidine (ZANAFLEX) 4 MG Tab TAKE ONE (1) TABLET BY MOUTH THREE TIMES DAILY  90 Tab 3   • ammonium lactate (LAC-HYDRIN) 12 % Lotion APPLY TO THE AFFECTED AREA TWICE DAILY 226 g 2   • nortriptyline (PAMELOR) 10 MG Cap TAKE ONE CAPSULE BY MOUTH NIGHTLY AT BEDTIME  30 Cap 6   • esomeprazole (NEXIUM) 40 MG delayed-release capsule TAKE ONE (1) CAPSULE BY MOUTH EVERY MORNING BEFORE BREAKFAST 30 Cap 5   • ondansetron (ZOFRAN) 4 MG Tab tablet TAKE ONE TABLET BY MOUTH EVERY EIGHT HOURS AS NEEDED NAUSEA AND VOMITING  60 Tab 3   • buPROPion SR (WELLBUTRIN-SR) 100 MG TABLET SR 12 HR Take 2 Tabs by mouth 2 Times a Day. 120 Tab 6   • levothyroxine (SYNTHROID) 125 MCG Tab Take 1 Tab by mouth Every morning on an empty stomach. 30 Tab 6   • tolterodine ER (DETROL LA) 4 MG CAPSULE SR 24 HR Take 1 Cap by mouth every day. 30 Cap 6   • Simethicone 125 MG Cap Spray 125 mg in mouth/throat as needed (gas).     • chlorpheniramine (CHLOR-TRIMETRON) 4 MG Tab Take 4 mg by mouth every 6 hours as needed for Allergies.     • furosemide (LASIX) 40 MG Tab Take 40 mg by mouth 2 Times a Day.     • lisinopril (PRINIVIL) 5 MG Tab Take 5 mg by mouth 2 times a day.     • esomeprazole magnesium (NEXIUM) 40 MG Pack Spray 40 mg in mouth/throat every day.     • mupirocin (BACTROBAN) 2 % Ointment Apply 1 Inch to affected area(s) every day. 1 Tube 11   • morphine ER (MS CONTIN) 15 MG Tab CR tablet Take 15 mg by mouth every 12 hours.     • oxycodone-acetaminophen (PERCOCET)  MG Tab Take 1 Tab by mouth every 6 hours as needed. 30 Tab 0   • docusate sodium (COLACE) 100 MG CAPS Take 200 mg by mouth 2 times a day.     • sennosides (SENOKOT) 8.6 MG TABS Take 8.6 mg by mouth every day. Indications: Constipation, **OTC**       No current facility-administered medications for this visit.         Allergies as of  "06/29/2017 - Rico as Reviewed 06/29/2017   Allergen Reaction Noted   • Atorvastatin  12/30/2014   • Ezetimibe  12/30/2014   • Gabapentin  12/30/2014   • Metoprolol  12/30/2014   • Other environmental  12/22/2011   • Spironolactone  02/26/2016   • Statins [hmg-coa-r inhibitors]  12/30/2014   • Tape Hives and Rash 08/01/2008        ROS: As per HPI.    /70 mmHg  Pulse 90  Temp(Src) 36.4 °C (97.5 °F)  Resp 16  Ht 1.753 m (5' 9.02\")  Wt 68.947 kg (152 lb)  BMI 22.44 kg/m2  SpO2 93%    Physical Exam:  Gen:         Alert and oriented, No apparent distress. Lucid and fluent. She is in a motorized wheelchair.   Neck:       Limitation to both extension and flexion and some limitation to rotation. There is marked posterior cervical spasm. No JVD or carotid bruits are appreciated. No cervical adenopathy is appreciated. No thyromegaly or neck mass appreciated.   Lungs:     Clear to auscultation bilaterally, clear to auscultation currently, breath sounds are slightly distant. No retractions are appreciated.  CV:          Regular rate and rhythm. No murmurs, rubs or gallops.     Abd:         Limited examination in wheelchair. Abdomen is soft and nontender. No masses or hepatomegaly appreciated.            Ext:          No clubbing, cyanosis, no peripheral edema.      Assessment and Plan.   71 y.o. female with the following issues.    1. Chronic anticoagulation/protein C and protein S deficiency  She will remain on warfarin anticoagulation with INR being checked every 2 weeks. Dosage adjustment is made today and discussed.  The warfarin dose change is written and given to her with the after visit summary. The note is in wrap up section of this visit.    2. Primary insomnia  The trazodone continues to be helpful and well tolerated and is renewed  - trazodone (DESYREL) 100 MG Tab; Take 2 Tabs by mouth every bedtime.  Dispense: 60 Tab; Refill: 6    3. Mild intermittent asthma, uncomplicated  The albuterol is helpful and " well tolerated and is renewed  - albuterol (PROAIR HFA) 108 (90 BASE) MCG/ACT Aero Soln inhalation aerosol; Inhale 2 Puffs by mouth every 6 hours as needed for Shortness of Breath.  Dispense: 8.5 g; Refill: 6    4. Encounter for screening mammogram for breast cancer  Order discussed and placed  - MA-MAMMO SCREENING BILAT W/DALTON W/CAD; Future    5. 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HCC)  She is in    6. Pulmonary emphysema, unspecified emphysema type (CMS-HCC)  This was mild on past CT. She remains quit from smoking and has not smoked for many years. This appears to be clinically stable. She is not oxygen dependent.    7. Recurrent major depressive disorder, in partial remission (CMS-[facility])  This is stable overall. She will continue her current regimen of trazodone, nortriptyline and Wellbutrin.  - Patient has been identified as being depressed and appropriate orders and counseling have been given    8. Delusions of parasitosis  She will continue the Lac-Hydrin lotion, clinically doing well.    9. Age-related incipient cataract of both eyes  Referral discussed and placed  - REFERRAL TO OPHTHALMOLOGY    10. Elevated IOP, bilateral  Referral discussed and placed  - REFERRAL TO OPHTHALMOLOGY    11. Atherosclerosis of native artery of both lower extremities with intermittent claudication (CMS-[facility])  This is been relatively mild without ulceration. She can still walk and pivot some in the home with support. Her limitations are mainly musculoskeletal rather than vascular.    14. Pulmonary hypertension (CMS-[facility])  This is been mild and clinically not significant    15. Rheumatoid arthritis involving multiple sites with positive rheumatoid factor (CMS-[facility])  The severity of this is unclear. It seems to be very mild overall and not a major factor in her problems. The majority of her arthritic change his osteoarthritis.

## 2017-07-06 ENCOUNTER — TELEPHONE (OUTPATIENT)
Dept: MEDICAL GROUP | Facility: CLINIC | Age: 72
End: 2017-07-06

## 2017-07-06 NOTE — TELEPHONE ENCOUNTER
1. Caller Name: Alexander son                      Call Back Number: [phone] (home)     2. Message: results in-home INR (2.4). Please advise.     3. Patient approves office to leave a detailed voicemail/MyChart message: yes

## 2017-07-07 NOTE — TELEPHONE ENCOUNTER
2.4 is great.  Please let them know that is good and stay on same dose.  Recheck in two weeks please.

## 2017-07-18 RX ORDER — LISINOPRIL 5 MG/1
TABLET ORAL
Qty: 60 TAB | Refills: 6 | Status: SHIPPED | OUTPATIENT
Start: 2017-07-18 | End: 2018-02-12 | Stop reason: SDUPTHER

## 2017-08-17 ENCOUNTER — TELEPHONE (OUTPATIENT)
Dept: MEDICAL GROUP | Facility: CLINIC | Age: 72
End: 2017-08-17

## 2017-08-17 NOTE — TELEPHONE ENCOUNTER
1. Caller Name: Dian Mendoza                      Call Back Number: [phone] (home)     2. Message: pt called stating her in-home INR on 8/17/17 is 2.5, this is a little higher then normal, please advise.  She is a little worried    3. Patient approves office to leave a detailed voicemail/MyChart message: yes

## 2017-08-17 NOTE — TELEPHONE ENCOUNTER
This is great, she should be between 2 and 3 so 2.5 is just fine. She should stay on the same dose

## 2017-08-22 RX ORDER — FUROSEMIDE 40 MG/1
TABLET ORAL
Qty: 60 TAB | Refills: 0 | Status: SHIPPED | OUTPATIENT
Start: 2017-08-22 | End: 2017-10-12

## 2017-09-14 ENCOUNTER — TELEPHONE (OUTPATIENT)
Dept: MEDICAL GROUP | Facility: CLINIC | Age: 72
End: 2017-09-14

## 2017-09-14 NOTE — TELEPHONE ENCOUNTER
VOICEMAIL  1. Caller Name: Dian Mendoza                      Call Back Number: [phone] (home)     2. Message: pt called her INR is 2.0 she is happy. Thank you     3. Patient approves office to leave a detailed voicemail/MyChart message: yes

## 2017-09-19 DIAGNOSIS — R21 EXCORIATED RASH: ICD-10-CM

## 2017-09-19 RX ORDER — FUROSEMIDE 40 MG/1
TABLET ORAL
Qty: 60 TAB | Refills: 6 | Status: SHIPPED | OUTPATIENT
Start: 2017-09-19 | End: 2018-01-17 | Stop reason: SDUPTHER

## 2017-09-19 RX ORDER — AMMONIUM LACTATE 12 G/100G
LOTION TOPICAL
Qty: 226 G | Refills: 1 | Status: SHIPPED | OUTPATIENT
Start: 2017-09-19 | End: 2018-07-05 | Stop reason: SDUPTHER

## 2017-09-19 RX ORDER — ONDANSETRON 4 MG/1
TABLET, FILM COATED ORAL
Qty: 60 TAB | Refills: 2 | Status: SHIPPED | OUTPATIENT
Start: 2017-09-19 | End: 2018-02-10 | Stop reason: SDUPTHER

## 2017-10-12 ENCOUNTER — OFFICE VISIT (OUTPATIENT)
Dept: MEDICAL GROUP | Facility: CLINIC | Age: 72
End: 2017-10-12
Payer: MEDICARE

## 2017-10-12 VITALS
BODY MASS INDEX: 22.51 KG/M2 | HEART RATE: 84 BPM | DIASTOLIC BLOOD PRESSURE: 70 MMHG | TEMPERATURE: 98.2 F | WEIGHT: 152 LBS | SYSTOLIC BLOOD PRESSURE: 130 MMHG | RESPIRATION RATE: 16 BRPM | HEIGHT: 69 IN | OXYGEN SATURATION: 94 %

## 2017-10-12 DIAGNOSIS — K27.9 PUD (PEPTIC ULCER DISEASE): ICD-10-CM

## 2017-10-12 DIAGNOSIS — Z23 NEED FOR IMMUNIZATION AGAINST INFLUENZA: ICD-10-CM

## 2017-10-12 DIAGNOSIS — E53.8 B12 DEFICIENCY: ICD-10-CM

## 2017-10-12 DIAGNOSIS — F41.8 DEPRESSION WITH ANXIETY: ICD-10-CM

## 2017-10-12 DIAGNOSIS — N39.46 MIXED URGE AND STRESS INCONTINENCE: ICD-10-CM

## 2017-10-12 DIAGNOSIS — E03.4 IDIOPATHIC ATROPHIC HYPOTHYROIDISM: ICD-10-CM

## 2017-10-12 DIAGNOSIS — M51.16 LUMBAR DISC HERNIATION WITH RADICULOPATHY: ICD-10-CM

## 2017-10-12 DIAGNOSIS — Z12.31 ENCOUNTER FOR SCREENING MAMMOGRAM FOR BREAST CANCER: ICD-10-CM

## 2017-10-12 PROCEDURE — 90662 IIV NO PRSV INCREASED AG IM: CPT | Performed by: FAMILY MEDICINE

## 2017-10-12 PROCEDURE — 99214 OFFICE O/P EST MOD 30 MIN: CPT | Mod: 25 | Performed by: FAMILY MEDICINE

## 2017-10-12 PROCEDURE — G0008 ADMIN INFLUENZA VIRUS VAC: HCPCS | Performed by: FAMILY MEDICINE

## 2017-10-12 RX ORDER — ESOMEPRAZOLE MAGNESIUM 40 MG/1
40 CAPSULE, DELAYED RELEASE ORAL
Qty: 30 CAP | Refills: 6 | Status: SHIPPED | OUTPATIENT
Start: 2017-10-12 | End: 2018-07-25 | Stop reason: SDUPTHER

## 2017-10-12 RX ORDER — TOLTERODINE 4 MG/1
4 CAPSULE, EXTENDED RELEASE ORAL DAILY
Qty: 30 CAP | Refills: 6 | Status: SHIPPED | OUTPATIENT
Start: 2017-10-12 | End: 2018-07-25 | Stop reason: SDUPTHER

## 2017-10-12 RX ORDER — GABAPENTIN 100 MG/1
100 CAPSULE ORAL 2 TIMES DAILY
Qty: 60 CAP | Refills: 0 | Status: SHIPPED | OUTPATIENT
Start: 2017-10-12 | End: 2017-11-10 | Stop reason: SDUPTHER

## 2017-10-12 RX ORDER — CYANOCOBALAMIN 1000 UG/ML
1000 INJECTION, SOLUTION INTRAMUSCULAR; SUBCUTANEOUS ONCE
Status: COMPLETED | OUTPATIENT
Start: 2017-10-12 | End: 2017-10-12

## 2017-10-12 RX ORDER — LEVOTHYROXINE SODIUM 0.12 MG/1
125 TABLET ORAL
Qty: 30 TAB | Refills: 6 | Status: SHIPPED | OUTPATIENT
Start: 2017-10-12 | End: 2018-04-10 | Stop reason: SDUPTHER

## 2017-10-12 RX ORDER — BUPROPION HYDROCHLORIDE 100 MG/1
200 TABLET, EXTENDED RELEASE ORAL 2 TIMES DAILY
Qty: 120 TAB | Refills: 6 | Status: SHIPPED | OUTPATIENT
Start: 2017-10-12 | End: 2018-04-10

## 2017-10-12 RX ADMIN — CYANOCOBALAMIN 1000 MCG: 1000 INJECTION, SOLUTION INTRAMUSCULAR; SUBCUTANEOUS at 09:42

## 2017-10-12 ASSESSMENT — PATIENT HEALTH QUESTIONNAIRE - PHQ9: CLINICAL INTERPRETATION OF PHQ2 SCORE: 0

## 2017-10-12 NOTE — PROGRESS NOTES
Chief Complaint   Patient presents with   • Hypothyroidism   • Abdominal Pain   • Immunizations       Subjective:     HPI:   Dian Mendoza presents today with the followin. Need for immunization against influenza  Administered today    2. B12 deficiency  She has been very low in the past and is feeling that fatigue again. Administered today.  - Cyanocobalamin    3. Encounter for screening mammogram for breast cancer  Order discussed and placed    4. Depression with anxiety  Here for: depression and anxiety.    Current symptoms include: palpitations, shortness of breath, insomnia, difficulty concentrating,  depressed mood, insomnia, fatigue, feelings of worthlessness/guilt, difficulty concentrating and hopelessness  Since last OV, symptoms are unchanged.  Increased anxiety over severe persistent pain.  She is taking medicine daily as directed. Side effects: Dry mouth.  Mood currently does affect: work, relationships, social activities.  Denies agoraphobia, panic attacks, SI/HI. (Denies wishing to be dead, thinking about death, intent to commit suicide, previous suicide attempt)     Review Of Systems  Taking supplements to help mood or symptoms: no  Using alcohol or other substances to help mood or symptoms: no  No polydipsia, polyuria, temperature intolerance, bowel changes  No visual or auditory hallucinations. Denies symptoms of ludmila: grandiosity, euphoria, need for little or no sleep, rapid pressured speech, spending sprees, reckless or risky behavior, hypersexual behavior.   Pertinent  ROS findings as above. All other systems reviewed and are negative.    5. Idiopathic atrophic hypothyroidism  Patient reports good energy level on the medication. Patient denies insomnia, tremor or change in appetite.  Patient is taking the medication on an empty stomach in the morning and waiting at least 30 minutes before eating.  Last TSH in 10/2016 was on target.    6. PUD (peptic ulcer disease)  She feels the  current medication regimen of nexium is controlling the gastroesophageal reflux symptoms well. Denies dysphagia, reflux symptoms, acidity or visible blood or mucus in the stool. She is getting some bloating and moderate epigastric pain. Denies vomiting or hematemesis. Denies burping or abdominal bloating. Patient avoids nonsteroidal anti-inflammatory drugs. Avoids heavy meals or eating within 2 hours of bedtime.    7. Mixed urge and stress incontinence  She states the tolterodine continues to be quite helpful. States there is some dry mouth but this is not significant for her. Helpful, would like a renewal. I agree.    8. Lumbar disc herniation with radiculopathy  Discussed trial of gabapentin.  Start very low dose.  Had some gait instability at very high dose in the past. That was begun abruptly at the time.    Discussed that she needs to complete her lab tests.  She had forgotten. Reprinted for her here today.    Patient Active Problem List    Diagnosis Date Noted   • Mixed hyperlipidemia 10/12/2015     Priority: High   • Chronic anticoagulation 10/12/2015     Priority: High   • LBBB (left bundle branch block) 10/12/2015     Priority: High   • CKD (chronic kidney disease) stage 3, GFR 30-59 ml/min 08/23/2014     Priority: High   • History of deep vein thrombosis 08/24/2012     Priority: High   • S/P IVC filter 10/08/2011     Priority: High   • PUD (peptic ulcer disease) 10/08/2011     Priority: High   • Muscle spasticity 12/07/2010     Priority: High   • Mild mitral regurgitation 10/12/2015     Priority: Medium   • Cardiomyopathy (HCC) 01/06/2015     Priority: Medium   • History of pulmonary embolism 04/12/2013     Priority: Medium   • Atherosclerosis of native arteries of extremity with intermittent claudication (CMS-[facility]) 12/19/2011     Priority: Medium   • 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[facility]) 02/01/2011     Priority: Medium   • Rheumatoid arthritis involving multiple sites with positive rheumatoid factor  (CMS-HCC) 12/07/2010     Priority: Medium   • Pain management 11/18/2016     Priority: Low   • Pulmonary hypertension 01/31/2014     Priority: Low   • Anxiety 10/08/2011     Priority: Low   • Lumbar disc herniation with radiculopathy      Priority: Low   • Idiopathic atrophic hypothyroidism      Priority: Low   • Protein C deficiency (CMS-HCC) 07/16/2009     Priority: Low   • Protein S deficiency (CMS-HCC) 07/16/2009     Priority: Low   • TMJ (temporomandibular joint syndrome) 06/19/2009     Priority: Low   • Recurrent major depressive disorder, in partial remission (CMS-HCC) 06/29/2017   • Essential hypertension, benign 06/22/2017   • Emphysema lung (CMS-HCC) 02/11/2017   • Anemia due to multiple mechanisms 09/14/2016   • 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/2016   • Vitamin D deficiency disease 02/11/2016   • Mild intermittent asthma 10/19/2015   • Cervical disc disease with myelopathy 03/11/2015   • Muscle weakness of lower extremity 03/11/2015   • MEDICAL HOME 01/12/2015   • GERD (gastroesophageal reflux disease) 09/05/2014   • Mixed urge and stress incontinence    • Chronic pain syndrome 08/21/2014   • Insomnia 03/10/2014   • History of decubitus ulcer 08/24/2012       Current medicines (including changes today)  Current Outpatient Prescriptions   Medication Sig Dispense Refill   • esomeprazole (NEXIUM) 40 MG delayed-release capsule Take 1 Cap by mouth every morning with breakfast. 30 Cap 6   • buPROPion SR (WELLBUTRIN-SR) 100 MG TABLET SR 12 HR Take 2 Tabs by mouth 2 Times a Day. 120 Tab 6   • levothyroxine (SYNTHROID) 125 MCG Tab Take 1 Tab by mouth Every morning on an empty stomach. 30 Tab 6   • tolterodine ER (DETROL LA) 4 MG CAPSULE SR 24 HR Take 1 Cap by mouth every day. 30 Cap 6   • gabapentin (NEURONTIN) 100 MG Cap Take 1 Cap by mouth 2 Times a Day. 60 Cap 0   • ammonium lactate (LAC-HYDRIN) 12 % Lotion APPLY TO THE AFFECTED AREA TWICE DAILY 226 g 1   • ondansetron (ZOFRAN) 4 MG Tab tablet TAKE ONE  "TABLET BY MOUTH EVERY EIGHT HOURS AS NEEDED FOR NAUSEA / VOMITING 60 Tab 2   • furosemide (LASIX) 40 MG Tab TAKE ONE (1) TABLET BY MOUTH TWICE DAILY  60 Tab 6   • furosemide (LASIX) 40 MG Tab TAKE ONE (1) TABLET BY MOUTH TWICE DAILY  60 Tab 0   • lisinopril (PRINIVIL) 5 MG Tab TAKE ONE (1) TABLET BY MOUTH TWICE DAILY  60 Tab 6   • trazodone (DESYREL) 100 MG Tab Take 2 Tabs by mouth every bedtime. 60 Tab 6   • albuterol (PROAIR HFA) 108 (90 BASE) MCG/ACT Aero Soln inhalation aerosol Inhale 2 Puffs by mouth every 6 hours as needed for Shortness of Breath. 8.5 g 6   • carvedilol (COREG) 12.5 MG Tab Take 0.5 Tabs by mouth 2 times a day, with meals. 60 Tab 6   • warfarin (COUMADIN) 3 MG Tab Take 3 Tabs by mouth every day. 90 Tab 3   • tizanidine (ZANAFLEX) 4 MG Tab TAKE ONE (1) TABLET BY MOUTH THREE TIMES DAILY  90 Tab 3   • nortriptyline (PAMELOR) 10 MG Cap TAKE ONE CAPSULE BY MOUTH NIGHTLY AT BEDTIME  30 Cap 6   • Simethicone 125 MG Cap Spray 125 mg in mouth/throat as needed (gas).     • chlorpheniramine (CHLOR-TRIMETRON) 4 MG Tab Take 4 mg by mouth every 6 hours as needed for Allergies.     • mupirocin (BACTROBAN) 2 % Ointment Apply 1 Inch to affected area(s) every day. 1 Tube 11   • morphine ER (MS CONTIN) 15 MG Tab CR tablet Take 15 mg by mouth every 12 hours.     • oxycodone-acetaminophen (PERCOCET)  MG Tab Take 1 Tab by mouth every 6 hours as needed. 30 Tab 0   • docusate sodium (COLACE) 100 MG CAPS Take 200 mg by mouth 2 times a day.     • sennosides (SENOKOT) 8.6 MG TABS Take 8.6 mg by mouth every day. Indications: Constipation, **OTC**       Current Facility-Administered Medications   Medication Dose Route Frequency Provider Last Rate Last Dose   • cyanocobalamin (VITAMIN B-12) injection 1,000 mcg  1,000 mcg Intramuscular Once Javon Reeves M.D.           Allergies   Allergen Reactions   • Ezetimibe      \"I fell and hit my head but it could've been the other medications that I was taken.\"   • " "Metoprolol      \"I fell and hit my head but it could've been the other medications that I was taken.\"     • Other Environmental    • Spironolactone      Abrupt renal failure   • Statins [Hmg-Coa-R Inhibitors]      \"I fell and hit my head but it could've been the other medications that I was taken.\"   • Tape Hives and Rash     Plastic tape       ROS: As per HPI       Objective:     Blood pressure 130/70, pulse 84, temperature 36.8 °C (98.2 °F), resp. rate 16, height 1.753 m (5' 9.02\"), weight 68.9 kg (152 lb), SpO2 94 %. Body mass index is 22.43 kg/m².    Physical Exam:  Constitutional: Well-developed and well-nourished. Not diaphoretic. No distress. Lucid and fluent.She is using her motorized wheelchair.  Skin: Skin is warm and dry. No rash noted.  Head: Atraumatic without lesions.  Eyes: Conjunctivae and extraocular motions are normal. Pupils are equal, round, and reactive to light. No scleral icterus.   Mouth/Throat: Tongue normal. Oropharynx is clear and moist. Posterior pharynx without erythema or exudates.  Neck: Supple, trachea midline. No thyromegaly present. No cervical or supraclavicular lymphadenopathy. No JVD or carotid bruits appreciated  Cardiovascular: Regular rate and rhythm.  Normal S1, S2 without murmur appreciated.  Chest: Effort normal. Clear to auscultation throughout. No adventitious sounds.   Abdomen: Soft, non tender, and without distention. Active bowel sounds in all four quadrants. Limited exam due to wheelchair status.  Extremities: No cyanosis, clubbing, erythema, nor edema.   Neurological: Alert and oriented x 3. DTRs 2+/3 and symmetric. No cranial nerve deficit.   Psychiatric:  Behavior, mood, and affect are appropriate.       Assessment and Plan:     72 y.o. female with the following issues:    1. Need for immunization against influenza  INFLUENZA VACCINE, HIGH DOSE (65+ ONLY)   2. B12 deficiency  cyanocobalamin (VITAMIN B-12) injection 1,000 mcg   3. Encounter for screening mammogram " for breast cancer  MA-MAMMO SCREENING BILAT W/DALTON W/CAD   4. Depression with anxiety  buPROPion SR (WELLBUTRIN-SR) 100 MG TABLET SR 12 HR   5. Idiopathic atrophic hypothyroidism  levothyroxine (SYNTHROID) 125 MCG Tab   6. PUD (peptic ulcer disease)  esomeprazole (NEXIUM) 40 MG delayed-release capsule   7. Mixed urge and stress incontinence  tolterodine ER (DETROL LA) 4 MG CAPSULE SR 24 HR   8. Lumbar disc herniation with radiculopathy  gabapentin (NEURONTIN) 100 MG Cap         Followup: Return in about 4 months (around 2/12/2018), or if symptoms worsen or fail to improve.

## 2017-10-13 ENCOUNTER — TELEPHONE (OUTPATIENT)
Dept: MEDICAL GROUP | Facility: CLINIC | Age: 72
End: 2017-10-13

## 2017-10-13 NOTE — TELEPHONE ENCOUNTER
VOICEMAIL  1. Caller Name: Alexander                       Call Back Number: [phone] (home)     2. Message: reporting in-home INR value (2.0). Please note    3. Patient approves office to leave a detailed voicemail/MyChart message: yes

## 2017-11-02 ENCOUNTER — TELEPHONE (OUTPATIENT)
Dept: MEDICAL GROUP | Facility: CLINIC | Age: 72
End: 2017-11-02

## 2017-11-02 NOTE — TELEPHONE ENCOUNTER
That is surprisingly low. Please ask how much Coumadin she is actually taking. We obviously need to increase. Please verify with him that it was indeed 0.9 instead of 1.9.

## 2017-11-02 NOTE — TELEPHONE ENCOUNTER
Patient verified that INR is 0.9. Per pt she is taking 2 pills of 3 mg 5 days and 3 pills of 3mg 2 days.

## 2017-11-02 NOTE — TELEPHONE ENCOUNTER
1. Caller Name: weston muniz                      Call Back Number: [phone] (home)     2. Message: INR results 0.9, please advise thank you     3. Patient approves office to leave a detailed voicemail/MyChart message: yes

## 2017-11-08 ENCOUNTER — OFFICE VISIT (OUTPATIENT)
Dept: CARDIOLOGY | Facility: MEDICAL CENTER | Age: 72
End: 2017-11-08
Payer: MEDICARE

## 2017-11-08 VITALS
HEIGHT: 69 IN | HEART RATE: 66 BPM | BODY MASS INDEX: 20.44 KG/M2 | WEIGHT: 138 LBS | DIASTOLIC BLOOD PRESSURE: 78 MMHG | OXYGEN SATURATION: 96 % | SYSTOLIC BLOOD PRESSURE: 116 MMHG

## 2017-11-08 DIAGNOSIS — Z79.01 CHRONIC ANTICOAGULATION: ICD-10-CM

## 2017-11-08 DIAGNOSIS — I27.20 PULMONARY HYPERTENSION (HCC): ICD-10-CM

## 2017-11-08 DIAGNOSIS — I44.7 LBBB (LEFT BUNDLE BRANCH BLOCK): ICD-10-CM

## 2017-11-08 DIAGNOSIS — I34.0 MILD MITRAL REGURGITATION: ICD-10-CM

## 2017-11-08 DIAGNOSIS — D68.59 PROTEIN S DEFICIENCY (HCC): ICD-10-CM

## 2017-11-08 DIAGNOSIS — E78.2 MIXED HYPERLIPIDEMIA: ICD-10-CM

## 2017-11-08 DIAGNOSIS — M05.79 RHEUMATOID ARTHRITIS INVOLVING MULTIPLE SITES WITH POSITIVE RHEUMATOID FACTOR (HCC): ICD-10-CM

## 2017-11-08 DIAGNOSIS — D68.59 PROTEIN C DEFICIENCY (HCC): ICD-10-CM

## 2017-11-08 DIAGNOSIS — I42.0 DILATED CARDIOMYOPATHY (HCC): ICD-10-CM

## 2017-11-08 DIAGNOSIS — Z86.711 HISTORY OF PULMONARY EMBOLISM: ICD-10-CM

## 2017-11-08 PROCEDURE — 99213 OFFICE O/P EST LOW 20 MIN: CPT | Performed by: INTERNAL MEDICINE

## 2017-11-08 ASSESSMENT — ENCOUNTER SYMPTOMS
ORTHOPNEA: 0
DEPRESSION: 1
COUGH: 0
PND: 0
BRUISES/BLEEDS EASILY: 0
FALLS: 0
MYALGIAS: 0
NERVOUS/ANXIOUS: 0
WHEEZING: 0

## 2017-11-08 ASSESSMENT — LIFESTYLE VARIABLES: SUBSTANCE_ABUSE: 0

## 2017-11-08 NOTE — LETTER
[facility]   [address]   [address]  [place] [address]  Phone: [phone]  Fax: [phone]              Dian Mendoza  1945    Encounter Date: 11/8/2017    Aijth Reddy M.D.          PROGRESS NOTE:  Subjective:   Dian Mendoza is a 72 y.o. female who presents today to come off carvedilol. She says she just cannot stand it because the depression and metoprolol was worse.  Her cardiomyopathy has been very satisfactory in terms of symptoms on this regimen, it is just that the depression bothers her.    Past Medical History:   Diagnosis Date   • Anemia    • Atrial tachycardia, paroxysmal (CMS-[facility])     occasional arrythmia, has been evaluated by Dr. Reddy   • Cancer of left breast (CMS-[facility]) 1997    Left breast   • Cardiomyopathy (CMS-[facility]) 1/6/2015    Idiopathic with normal coronary arteries on cath 1/6/2015    • Cholelithiasis 2014    With gallbladder wall thickening, now status post cholecystectomy   • COPD (chronic obstructive pulmonary disease) (CMS-[facility])    • E. coli sepsis (CMS-[facility])     Right buttock pressure wound   • Full dentures    • Gastritis    • Hypertension     • Hypokalemia     uncertain cause maintained with potassium replacement.   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   • Pulmonary hypertension    • Renal atrophy, right    • Rheumatoid arthritis(714.0)     historical diagnosis   • S/P insertion of IVC (inferior vena caval) filter     Prior to 2000, records not available, not retrievable.   • Seizure (CMS-[facility])     As  "a child   • Sleep apnea     Witnessed in hospital needs work up   • Systolic and diastolic CHF, chronic (CMS-HCC)    • TMJ (temporomandibular joint syndrome)      Past Surgical History:   Procedure Laterality Date   • VAMSHI BY LAPAROSCOPY  8/27/2014    Performed by Ajith Kearney M.D. at SURGERY [facility]   • GASTROSCOPY WITH BIOPSY  12/28/2011    Performed by RAD CANTU at SURGERY [facility]   • EGD W/ENDOSCOPIC ULTRASOUND  12/28/2011    Performed by RAD CANTU at SURGERY [facility]   • ERCP  12/28/2011    Performed by RAD CANTU at [facility]   • RECOVERY  12/8/2010    Performed by TERRELL MCKENNA at [facility]   • KNEE REPLACEMENT, TOTAL  2009    left   • ULCER DEBRIDEMENT  6/5/08    Performed by ARIELLE MITCHELL at SURGERY [facility]   • FLAP CLOSURE  6/5/08    Performed by ARIELLE MITCHELL at SURGERY [facility]   • LUMBAR FUSION POSTERIOR  1998   • ABDOMINAL HYSTERECTOMY TOTAL  1970's   • ELBOW ORIF  1950    right   • BURSA EXCISION      bursa sac removed bilat hips   • CATARACT EXTRACTION WITH IOL Bilateral    • COLONOSCOPY  2001, 6/2010    normal   • KNEE ARTHROSCOPY      left   • MASTECTOMY      left   • OTHER ORTHOPEDIC SURGERY      BILAT FOOT   • TONSILLECTOMY       Family History   Problem Relation Age of Onset   • Heart Disease Sister    • Heart Disease Mother    • Heart Disease Father    • Cancer Father      esophageal   • Non-contributory Brother      copd, cirrhosis, alzheimer's mild   • Heart Disease Brother      3 MIs   • GI Sister      colon polyps, precancerous   • Cancer Sister      breast X 2     History   Smoking Status   • Former Smoker   • Packs/day: 1.50   • Years: 40.00   • Types: Cigarettes   • Quit date: 1/1/1972   Smokeless Tobacco   • Former User     Allergies   Allergen Reactions   • Ezetimibe      \"I fell and hit my head but it could've been the other medications that I was taken.\"   • " "Metoprolol      \"I fell and hit my head but it could've been the other medications that I was taken.\"     • Other Environmental    • Spironolactone      Abrupt renal failure   • Statins [Hmg-Coa-R Inhibitors]      \"I fell and hit my head but it could've been the other medications that I was taken.\"   • Tape Hives and Rash     Plastic tape     Outpatient Encounter Prescriptions as of 11/8/2017   Medication Sig Dispense Refill   • esomeprazole (NEXIUM) 40 MG delayed-release capsule Take 1 Cap by mouth every morning with breakfast. 30 Cap 6   • buPROPion SR (WELLBUTRIN-SR) 100 MG TABLET SR 12 HR Take 2 Tabs by mouth 2 Times a Day. 120 Tab 6   • levothyroxine (SYNTHROID) 125 MCG Tab Take 1 Tab by mouth Every morning on an empty stomach. 30 Tab 6   • tolterodine ER (DETROL LA) 4 MG CAPSULE SR 24 HR Take 1 Cap by mouth every day. 30 Cap 6   • gabapentin (NEURONTIN) 100 MG Cap Take 1 Cap by mouth 2 Times a Day. 60 Cap 0   • ammonium lactate (LAC-HYDRIN) 12 % Lotion APPLY TO THE AFFECTED AREA TWICE DAILY 226 g 1   • ondansetron (ZOFRAN) 4 MG Tab tablet TAKE ONE TABLET BY MOUTH EVERY EIGHT HOURS AS NEEDED FOR NAUSEA / VOMITING 60 Tab 2   • furosemide (LASIX) 40 MG Tab TAKE ONE (1) TABLET BY MOUTH TWICE DAILY  60 Tab 6   • lisinopril (PRINIVIL) 5 MG Tab TAKE ONE (1) TABLET BY MOUTH TWICE DAILY  60 Tab 6   • trazodone (DESYREL) 100 MG Tab Take 2 Tabs by mouth every bedtime. 60 Tab 6   • albuterol (PROAIR HFA) 108 (90 BASE) MCG/ACT Aero Soln inhalation aerosol Inhale 2 Puffs by mouth every 6 hours as needed for Shortness of Breath. 8.5 g 6   • carvedilol (COREG) 12.5 MG Tab Take 0.5 Tabs by mouth 2 times a day, with meals. 60 Tab 6   • warfarin (COUMADIN) 3 MG Tab Take 3 Tabs by mouth every day. 90 Tab 3   • tizanidine (ZANAFLEX) 4 MG Tab TAKE ONE (1) TABLET BY MOUTH THREE TIMES DAILY  90 Tab 3   • nortriptyline (PAMELOR) 10 MG Cap TAKE ONE CAPSULE BY MOUTH NIGHTLY AT BEDTIME  30 Cap 6   • Simethicone 125 MG Cap Spray 125 mg in " "mouth/throat as needed (gas).     • chlorpheniramine (CHLOR-TRIMETRON) 4 MG Tab Take 4 mg by mouth every 6 hours as needed for Allergies.     • mupirocin (BACTROBAN) 2 % Ointment Apply 1 Inch to affected area(s) every day. 1 Tube 11   • morphine ER (MS CONTIN) 15 MG Tab CR tablet Take 15 mg by mouth every 12 hours.     • oxycodone-acetaminophen (PERCOCET)  MG Tab Take 1 Tab by mouth every 6 hours as needed. 30 Tab 0   • docusate sodium (COLACE) 100 MG CAPS Take 200 mg by mouth 2 times a day.     • sennosides (SENOKOT) 8.6 MG TABS Take 8.6 mg by mouth every day. Indications: Constipation, **OTC**       No facility-administered encounter medications on file as of 11/8/2017.      Review of Systems   HENT: Negative for nosebleeds.    Respiratory: Negative for cough and wheezing.    Cardiovascular: Negative for orthopnea and PND.   Musculoskeletal: Negative for falls and myalgias.   Endo/Heme/Allergies: Does not bruise/bleed easily.   Psychiatric/Behavioral: Positive for depression. Negative for substance abuse. The patient is not nervous/anxious.         Objective:   /78   Pulse 66   Ht 1.753 m (5' 9\")   Wt 62.6 kg (138 lb)   SpO2 96%   BMI 20.38 kg/m²      Physical Exam   Constitutional: She is oriented to person, place, and time. She appears well-developed and well-nourished. No distress.   She does well with her scooter   Eyes: Conjunctivae are normal. No scleral icterus.   Neck: No JVD present.   Cardiovascular: Normal rate, regular rhythm and S1 normal.  Exam reveals no gallop.    Murmur (2/6 systolic ejection murmur radiating to carotids which have normal amplitude and contour) heard.  Paradoxically split 2nd sound with well-preserved A2   Pulmonary/Chest: Effort normal and breath sounds normal.   Musculoskeletal: She exhibits edema (1+ bilateral).   Neurological: She is alert and oriented to person, place, and time.   Skin: Skin is warm and dry. She is not diaphoretic.   Psychiatric: She has a " normal mood and affect. Thought content normal.       Assessment:     1. Dilated cardiomyopathy (CMS-[facility])  ECHOCARDIOGRAM COMP W/O CONT   2. LBBB (left bundle branch block)     3. History of pulmonary embolism     4. Pulmonary hypertension     5. Chronic anticoagulation  CBC WITH DIFFERENTIAL   6. Rheumatoid arthritis involving multiple sites with positive rheumatoid factor (CMS-[facility])  WESTERGREN SED RATE   7. Mild mitral regurgitation     8. Protein C deficiency (CMS-[facility])     9. Protein S deficiency (CMS-[facility])     10. Mixed hyperlipidemia  LIPID PROFILE    COMP METABOLIC PANEL    TSH   I explained to her the rationale of the carvedilol and that she really improved with it but she is absolutely opposed to continuing it.    Medical Decision Making:  Today's Assessment / Status / Plan:     Taper off the carvedilol over the next 2 weeks and then obtain an echocardiogram and call for those results. She is also due for follow-up lab as noted above. Continue primary follow-up with Dr. Reeves as well. Assuming her ventricular function does not collapse she'll be seen in follow-up in January.      Javon Reeves M.D.  [address] [facility] [id number]  [address]  [facility] [id number]  VIA In Basket

## 2017-11-09 ENCOUNTER — HOSPITAL ENCOUNTER (OUTPATIENT)
Dept: LAB | Facility: MEDICAL CENTER | Age: 72
End: 2017-11-09
Attending: INTERNAL MEDICINE
Payer: MEDICARE

## 2017-11-09 DIAGNOSIS — M05.79 RHEUMATOID ARTHRITIS INVOLVING MULTIPLE SITES WITH POSITIVE RHEUMATOID FACTOR (HCC): ICD-10-CM

## 2017-11-09 DIAGNOSIS — E78.2 MIXED HYPERLIPIDEMIA: ICD-10-CM

## 2017-11-09 DIAGNOSIS — Z79.01 CHRONIC ANTICOAGULATION: ICD-10-CM

## 2017-11-09 LAB
ALBUMIN SERPL BCP-MCNC: 4 G/DL (ref 3.2–4.9)
ALBUMIN/GLOB SERPL: 1.4 G/DL
ALP SERPL-CCNC: 49 U/L (ref 30–99)
ALT SERPL-CCNC: 6 U/L (ref 2–50)
ANION GAP SERPL CALC-SCNC: 6 MMOL/L (ref 0–11.9)
AST SERPL-CCNC: 22 U/L (ref 12–45)
BASOPHILS # BLD AUTO: 0.9 % (ref 0–1.8)
BASOPHILS # BLD: 0.05 K/UL (ref 0–0.12)
BILIRUB SERPL-MCNC: 0.2 MG/DL (ref 0.1–1.5)
BUN SERPL-MCNC: 23 MG/DL (ref 8–22)
CALCIUM SERPL-MCNC: 9.1 MG/DL (ref 8.5–10.5)
CHLORIDE SERPL-SCNC: 110 MMOL/L (ref 96–112)
CHOLEST SERPL-MCNC: 196 MG/DL (ref 100–199)
CO2 SERPL-SCNC: 22 MMOL/L (ref 20–33)
CREAT SERPL-MCNC: 1.06 MG/DL (ref 0.5–1.4)
EOSINOPHIL # BLD AUTO: 0.43 K/UL (ref 0–0.51)
EOSINOPHIL NFR BLD: 7.7 % (ref 0–6.9)
ERYTHROCYTE [DISTWIDTH] IN BLOOD BY AUTOMATED COUNT: 48.8 FL (ref 35.9–50)
GFR SERPL CREATININE-BSD FRML MDRD: 51 ML/MIN/1.73 M 2
GLOBULIN SER CALC-MCNC: 2.9 G/DL (ref 1.9–3.5)
GLUCOSE SERPL-MCNC: 72 MG/DL (ref 65–99)
HCT VFR BLD AUTO: 33.6 % (ref 37–47)
HDLC SERPL-MCNC: 69 MG/DL
HGB BLD-MCNC: 10.5 G/DL (ref 12–16)
IMM GRANULOCYTES # BLD AUTO: 0.01 K/UL (ref 0–0.11)
IMM GRANULOCYTES NFR BLD AUTO: 0.2 % (ref 0–0.9)
LDLC SERPL CALC-MCNC: 117 MG/DL
LYMPHOCYTES # BLD AUTO: 1.83 K/UL (ref 1–4.8)
LYMPHOCYTES NFR BLD: 33 % (ref 22–41)
MCH RBC QN AUTO: 29.8 PG (ref 27–33)
MCHC RBC AUTO-ENTMCNC: 31.3 G/DL (ref 33.6–35)
MCV RBC AUTO: 95.5 FL (ref 81.4–97.8)
MONOCYTES # BLD AUTO: 0.3 K/UL (ref 0–0.85)
MONOCYTES NFR BLD AUTO: 5.4 % (ref 0–13.4)
NEUTROPHILS # BLD AUTO: 2.93 K/UL (ref 2–7.15)
NEUTROPHILS NFR BLD: 52.8 % (ref 44–72)
NRBC # BLD AUTO: 0 K/UL
NRBC BLD AUTO-RTO: 0 /100 WBC
PLATELET # BLD AUTO: 324 K/UL (ref 164–446)
PMV BLD AUTO: 11.7 FL (ref 9–12.9)
POTASSIUM SERPL-SCNC: 4.8 MMOL/L (ref 3.6–5.5)
PROT SERPL-MCNC: 6.9 G/DL (ref 6–8.2)
RBC # BLD AUTO: 3.52 M/UL (ref 4.2–5.4)
SODIUM SERPL-SCNC: 138 MMOL/L (ref 135–145)
TRIGL SERPL-MCNC: 52 MG/DL (ref 0–149)
TSH SERPL DL<=0.005 MIU/L-ACNC: 0.28 UIU/ML (ref 0.3–3.7)
WBC # BLD AUTO: 5.6 K/UL (ref 4.8–10.8)

## 2017-11-09 PROCEDURE — 80061 LIPID PANEL: CPT

## 2017-11-09 PROCEDURE — 80053 COMPREHEN METABOLIC PANEL: CPT

## 2017-11-09 PROCEDURE — 85025 COMPLETE CBC W/AUTO DIFF WBC: CPT

## 2017-11-09 PROCEDURE — 84443 ASSAY THYROID STIM HORMONE: CPT

## 2017-11-09 PROCEDURE — 36415 COLL VENOUS BLD VENIPUNCTURE: CPT

## 2017-11-09 NOTE — PROGRESS NOTES
Subjective:   Dian Mendoza is a 72 y.o. female who presents today to come off carvedilol. She says she just cannot stand it because the depression and metoprolol was worse.  Her cardiomyopathy has been very satisfactory in terms of symptoms on this regimen, it is just that the depression bothers her.    Past Medical History:   Diagnosis Date   • Anemia    • Atrial tachycardia, paroxysmal (CMS-[facility])     occasional arrythmia, has been evaluated by Dr. Reddy   • Cancer of left breast (CMS-HCC) 1997    Left breast   • Cardiomyopathy (CMS-HCC) 1/6/2015    Idiopathic with normal coronary arteries on cath 1/6/2015    • Cholelithiasis 2014    With gallbladder wall thickening, now status post cholecystectomy   • COPD (chronic obstructive pulmonary disease) (CMS-[facility])    • E. coli sepsis (CMS-[facility])     Right buttock pressure wound   • Full dentures    • Gastritis    • Hypertension     • Hypokalemia     uncertain cause maintained with potassium replacement.   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   • Pulmonary hypertension    • Renal atrophy, right    • Rheumatoid arthritis(714.0)     historical diagnosis   • S/P insertion of IVC (inferior vena caval) filter     Prior to 2000, records not available, not retrievable.   • Seizure (CMS-[facility])     As a child   • Sleep apnea     Witnessed in hospital needs work up   • Systolic and diastolic CHF, chronic (CMS-[facility])    • TMJ (temporomandibular joint syndrome)      Past Surgical History:   Procedure Laterality Date   • VAMSHI BY LAPAROSCOPY  8/27/2014    Performed by Ajith Kearney M.D. at  "SURGERY [facility] ORS   • GASTROSCOPY WITH BIOPSY  12/28/2011    Performed by RAD CANTU at [facility]   • EGD W/ENDOSCOPIC ULTRASOUND  12/28/2011    Performed by RAD CANTU at [facility]   • ERCP  12/28/2011    Performed by RAD CANTU at [facility]   • RECOVERY  12/8/2010    Performed by TERRELL MCKENNA at [facility]   • KNEE REPLACEMENT, TOTAL  2009    left   • ULCER DEBRIDEMENT  6/5/08    Performed by ARIELLE MITCHELL at [facility]   • FLAP CLOSURE  6/5/08    Performed by ARIELLE MITCHELL at SURGERY [facility]   • LUMBAR FUSION POSTERIOR  1998   • ABDOMINAL HYSTERECTOMY TOTAL  1970's   • ELBOW ORIF  1950    right   • BURSA EXCISION      bursa sac removed bilat hips   • CATARACT EXTRACTION WITH IOL Bilateral    • COLONOSCOPY  2001, 6/2010    normal   • KNEE ARTHROSCOPY      left   • MASTECTOMY      left   • OTHER ORTHOPEDIC SURGERY      BILAT FOOT   • TONSILLECTOMY       Family History   Problem Relation Age of Onset   • Heart Disease Sister    • Heart Disease Mother    • Heart Disease Father    • Cancer Father      esophageal   • Non-contributory Brother      copd, cirrhosis, alzheimer's mild   • Heart Disease Brother      3 MIs   • GI Sister      colon polyps, precancerous   • Cancer Sister      breast X 2     History   Smoking Status   • Former Smoker   • Packs/day: 1.50   • Years: 40.00   • Types: Cigarettes   • Quit date: 1/1/1972   Smokeless Tobacco   • Former User     Allergies   Allergen Reactions   • Ezetimibe      \"I fell and hit my head but it could've been the other medications that I was taken.\"   • Metoprolol      \"I fell and hit my head but it could've been the other medications that I was taken.\"     • Other Environmental    • Spironolactone      Abrupt renal failure   • Statins [Hmg-Coa-R Inhibitors]      \"I fell and hit my head but it could've been the other medications that I was " "taken.\"   • Tape Hives and Rash     Plastic tape     Outpatient Encounter Prescriptions as of 11/8/2017   Medication Sig Dispense Refill   • esomeprazole (NEXIUM) 40 MG delayed-release capsule Take 1 Cap by mouth every morning with breakfast. 30 Cap 6   • buPROPion SR (WELLBUTRIN-SR) 100 MG TABLET SR 12 HR Take 2 Tabs by mouth 2 Times a Day. 120 Tab 6   • levothyroxine (SYNTHROID) 125 MCG Tab Take 1 Tab by mouth Every morning on an empty stomach. 30 Tab 6   • tolterodine ER (DETROL LA) 4 MG CAPSULE SR 24 HR Take 1 Cap by mouth every day. 30 Cap 6   • gabapentin (NEURONTIN) 100 MG Cap Take 1 Cap by mouth 2 Times a Day. 60 Cap 0   • ammonium lactate (LAC-HYDRIN) 12 % Lotion APPLY TO THE AFFECTED AREA TWICE DAILY 226 g 1   • ondansetron (ZOFRAN) 4 MG Tab tablet TAKE ONE TABLET BY MOUTH EVERY EIGHT HOURS AS NEEDED FOR NAUSEA / VOMITING 60 Tab 2   • furosemide (LASIX) 40 MG Tab TAKE ONE (1) TABLET BY MOUTH TWICE DAILY  60 Tab 6   • lisinopril (PRINIVIL) 5 MG Tab TAKE ONE (1) TABLET BY MOUTH TWICE DAILY  60 Tab 6   • trazodone (DESYREL) 100 MG Tab Take 2 Tabs by mouth every bedtime. 60 Tab 6   • albuterol (PROAIR HFA) 108 (90 BASE) MCG/ACT Aero Soln inhalation aerosol Inhale 2 Puffs by mouth every 6 hours as needed for Shortness of Breath. 8.5 g 6   • carvedilol (COREG) 12.5 MG Tab Take 0.5 Tabs by mouth 2 times a day, with meals. 60 Tab 6   • warfarin (COUMADIN) 3 MG Tab Take 3 Tabs by mouth every day. 90 Tab 3   • tizanidine (ZANAFLEX) 4 MG Tab TAKE ONE (1) TABLET BY MOUTH THREE TIMES DAILY  90 Tab 3   • nortriptyline (PAMELOR) 10 MG Cap TAKE ONE CAPSULE BY MOUTH NIGHTLY AT BEDTIME  30 Cap 6   • Simethicone 125 MG Cap Spray 125 mg in mouth/throat as needed (gas).     • chlorpheniramine (CHLOR-TRIMETRON) 4 MG Tab Take 4 mg by mouth every 6 hours as needed for Allergies.     • mupirocin (BACTROBAN) 2 % Ointment Apply 1 Inch to affected area(s) every day. 1 Tube 11   • morphine ER (MS CONTIN) 15 MG Tab CR tablet Take 15 mg " "by mouth every 12 hours.     • oxycodone-acetaminophen (PERCOCET)  MG Tab Take 1 Tab by mouth every 6 hours as needed. 30 Tab 0   • docusate sodium (COLACE) 100 MG CAPS Take 200 mg by mouth 2 times a day.     • sennosides (SENOKOT) 8.6 MG TABS Take 8.6 mg by mouth every day. Indications: Constipation, **OTC**       No facility-administered encounter medications on file as of 11/8/2017.      Review of Systems   HENT: Negative for nosebleeds.    Respiratory: Negative for cough and wheezing.    Cardiovascular: Negative for orthopnea and PND.   Musculoskeletal: Negative for falls and myalgias.   Endo/Heme/Allergies: Does not bruise/bleed easily.   Psychiatric/Behavioral: Positive for depression. Negative for substance abuse. The patient is not nervous/anxious.         Objective:   /78   Pulse 66   Ht 1.753 m (5' 9\")   Wt 62.6 kg (138 lb)   SpO2 96%   BMI 20.38 kg/m²     Physical Exam   Constitutional: She is oriented to person, place, and time. She appears well-developed and well-nourished. No distress.   She does well with her scooter   Eyes: Conjunctivae are normal. No scleral icterus.   Neck: No JVD present.   Cardiovascular: Normal rate, regular rhythm and S1 normal.  Exam reveals no gallop.    Murmur (2/6 systolic ejection murmur radiating to carotids which have normal amplitude and contour) heard.  Paradoxically split 2nd sound with well-preserved A2   Pulmonary/Chest: Effort normal and breath sounds normal.   Musculoskeletal: She exhibits edema (1+ bilateral).   Neurological: She is alert and oriented to person, place, and time.   Skin: Skin is warm and dry. She is not diaphoretic.   Psychiatric: She has a normal mood and affect. Thought content normal.       Assessment:     1. Dilated cardiomyopathy (CMS-HCC)  ECHOCARDIOGRAM COMP W/O CONT   2. LBBB (left bundle branch block)     3. History of pulmonary embolism     4. Pulmonary hypertension     5. Chronic anticoagulation  CBC WITH DIFFERENTIAL "   6. Rheumatoid arthritis involving multiple sites with positive rheumatoid factor (CMS-HCC)  WESTERGREN SED RATE   7. Mild mitral regurgitation     8. Protein C deficiency (CMS-HCC)     9. Protein S deficiency (CMS-HCC)     10. Mixed hyperlipidemia  LIPID PROFILE    COMP METABOLIC PANEL    TSH   I explained to her the rationale of the carvedilol and that she really improved with it but she is absolutely opposed to continuing it.    Medical Decision Making:  Today's Assessment / Status / Plan:     Taper off the carvedilol over the next 2 weeks and then obtain an echocardiogram and call for those results. She is also due for follow-up lab as noted above. Continue primary follow-up with Dr. Reeves as well. Assuming her ventricular function does not collapse she'll be seen in follow-up in January.

## 2017-11-10 DIAGNOSIS — M51.16 LUMBAR DISC HERNIATION WITH RADICULOPATHY: ICD-10-CM

## 2017-11-10 RX ORDER — GABAPENTIN 100 MG/1
CAPSULE ORAL
Qty: 60 CAP | Refills: 3 | Status: SHIPPED | OUTPATIENT
Start: 2017-11-10 | End: 2018-01-17

## 2017-11-13 ENCOUNTER — HOSPITAL ENCOUNTER (OUTPATIENT)
Dept: LAB | Facility: MEDICAL CENTER | Age: 72
End: 2017-11-13
Attending: INTERNAL MEDICINE
Payer: MEDICARE

## 2017-11-13 LAB — ERYTHROCYTE [SEDIMENTATION RATE] IN BLOOD BY WESTERGREN METHOD: 13 MM/HOUR (ref 0–30)

## 2017-11-13 PROCEDURE — 85652 RBC SED RATE AUTOMATED: CPT

## 2017-11-16 ENCOUNTER — TELEPHONE (OUTPATIENT)
Dept: CARDIOLOGY | Facility: MEDICAL CENTER | Age: 72
End: 2017-11-16

## 2017-11-16 NOTE — TELEPHONE ENCOUNTER
----- Message from Pam Mosqueda sent at 11/16/2017 10:32 AM PST -----  Regarding: question about medication  NI/Amber    Patient has a question about her medication and recent lab work she had done. She can be reached at [phone].

## 2017-11-17 ENCOUNTER — TELEPHONE (OUTPATIENT)
Dept: MEDICAL GROUP | Facility: CLINIC | Age: 72
End: 2017-11-17

## 2017-11-17 NOTE — TELEPHONE ENCOUNTER
1. Caller Name: alexander son                      Call Back Number: [phone] (home)     2. Message: Alexander son call Pat INR is (5.7) she is taking 9 mg daily please advise. This is right today.     3. Patient approves office to leave a detailed voicemail/MyChart message: yes

## 2017-11-18 NOTE — TELEPHONE ENCOUNTER
Please have her take NONE tonight, if already taken today do not take tomorrow.  After this reduce to 6 mg every day.  Please recheck in a week.

## 2017-11-30 ENCOUNTER — TELEPHONE (OUTPATIENT)
Dept: MEDICAL GROUP | Facility: CLINIC | Age: 72
End: 2017-11-30

## 2017-11-30 NOTE — TELEPHONE ENCOUNTER
1. Caller Name: flavio son                      Call Back Number: [phone] (home)     2. Message: today's INR is 1.3. Thank you    3. Patient approves office to leave a detailed voicemail/MyChart message: yes

## 2017-12-01 NOTE — TELEPHONE ENCOUNTER
Please double check with her that she is actually taking the coumadin medication and what she is taking.  Frustrating, seems to be doing worse!

## 2017-12-07 ENCOUNTER — HOSPITAL ENCOUNTER (OUTPATIENT)
Dept: CARDIOLOGY | Facility: MEDICAL CENTER | Age: 72
End: 2017-12-07
Attending: INTERNAL MEDICINE
Payer: MEDICARE

## 2017-12-07 DIAGNOSIS — I42.0 DILATED CARDIOMYOPATHY (HCC): ICD-10-CM

## 2017-12-07 PROCEDURE — 93306 TTE W/DOPPLER COMPLETE: CPT | Mod: 26 | Performed by: INTERNAL MEDICINE

## 2017-12-07 PROCEDURE — 93306 TTE W/DOPPLER COMPLETE: CPT

## 2017-12-08 LAB
LV EJECT FRACT  99904: 45
LV EJECT FRACT MOD 2C 99903: 43.34
LV EJECT FRACT MOD 4C 99902: 46.23
LV EJECT FRACT MOD BP 99901: 43.07

## 2017-12-14 ENCOUNTER — TELEPHONE (OUTPATIENT)
Dept: CARDIOLOGY | Facility: MEDICAL CENTER | Age: 72
End: 2017-12-14

## 2017-12-14 NOTE — TELEPHONE ENCOUNTER
She has an appointment with Dr. Jacques in January.    Echo read by Dr. WALKER:    CONCLUSIONS  PRior echo 08/24/16Normal left ventricular chamber size.  Mild concentric left ventricular hypertrophy.  Left ventricular ejection fraction is visually estimated to be 45%.  Grade I diastolic dysfunction.  Aortic sclerosis without stenosis.  Trace aortic insufficiency.  Mitral annular calcification.  Trace mitral regurgitation.  Mild tricuspid regurgitation.  Normal pericardium without effusion.    Please advise.

## 2017-12-15 ENCOUNTER — TELEPHONE (OUTPATIENT)
Dept: CARDIOLOGY | Facility: MEDICAL CENTER | Age: 72
End: 2017-12-15

## 2017-12-15 DIAGNOSIS — D68.59 PROTEIN S DEFICIENCY (HCC): ICD-10-CM

## 2017-12-15 DIAGNOSIS — D68.59 PROTEIN C DEFICIENCY (HCC): ICD-10-CM

## 2017-12-15 DIAGNOSIS — Z79.01 CHRONIC ANTICOAGULATION: ICD-10-CM

## 2017-12-15 RX ORDER — WARFARIN SODIUM 3 MG/1
TABLET ORAL
Qty: 90 TAB | Refills: 6 | Status: SHIPPED | OUTPATIENT
Start: 2017-12-15 | End: 2018-05-15 | Stop reason: SDUPTHER

## 2017-12-15 NOTE — TELEPHONE ENCOUNTER
I informed her of the results and let her know Dr. Reddy would be letting us know further what the plan is.

## 2017-12-15 NOTE — TELEPHONE ENCOUNTER
----- Message from Sharyn Pina sent at 12/15/2017  9:56 AM PST -----  Regarding: Patient wants to get Echo results  NI/Sandy    Patient wants to get the results of her Echo and can be reached at [phone].

## 2017-12-18 ENCOUNTER — TELEPHONE (OUTPATIENT)
Dept: MEDICAL GROUP | Facility: CLINIC | Age: 72
End: 2017-12-18

## 2017-12-20 ENCOUNTER — TELEPHONE (OUTPATIENT)
Dept: MEDICAL GROUP | Facility: CLINIC | Age: 72
End: 2017-12-20

## 2017-12-20 NOTE — TELEPHONE ENCOUNTER
1. Caller Name: Dian Mendoza                                         Call Back Number: [phone] (home)       Patient approves a detailed voicemail message: yes    Patient states her INR is 2.6.  SHANIA

## 2017-12-30 ENCOUNTER — TELEPHONE (OUTPATIENT)
Dept: CARDIOLOGY | Facility: MEDICAL CENTER | Age: 72
End: 2017-12-30

## 2017-12-31 NOTE — TELEPHONE ENCOUNTER
Compared to her previous study ejection fraction is certainly not worse and may in fact be a little better. She is following up the abnormal hematology studies with Dr. Reeves.  Her thyroid dose is also being tracked.  Her depression is dramatically better off carvedilol. Therefore we're going to stay off until she sees Dr. Jacques in 3 weeks.  I discussed these findings with her.  I explained my concern is that she really did improve on therapy from an ejection fraction below 30% what is now about 40% and I hate to risk loss of that remission but she really cannot tolerate beta blockade.

## 2018-01-17 ENCOUNTER — OFFICE VISIT (OUTPATIENT)
Dept: CARDIOLOGY | Facility: MEDICAL CENTER | Age: 73
End: 2018-01-17
Payer: MEDICARE

## 2018-01-17 VITALS
HEART RATE: 98 BPM | DIASTOLIC BLOOD PRESSURE: 62 MMHG | RESPIRATION RATE: 14 BRPM | WEIGHT: 143 LBS | HEIGHT: 69 IN | BODY MASS INDEX: 21.18 KG/M2 | OXYGEN SATURATION: 94 % | SYSTOLIC BLOOD PRESSURE: 104 MMHG

## 2018-01-17 DIAGNOSIS — Z86.711 HISTORY OF PULMONARY EMBOLISM: ICD-10-CM

## 2018-01-17 DIAGNOSIS — I50.32 CHRONIC DIASTOLIC CONGESTIVE HEART FAILURE (HCC): ICD-10-CM

## 2018-01-17 DIAGNOSIS — N18.30 CKD (CHRONIC KIDNEY DISEASE) STAGE 3, GFR 30-59 ML/MIN (HCC): ICD-10-CM

## 2018-01-17 DIAGNOSIS — I42.0 DILATED CARDIOMYOPATHY (HCC): ICD-10-CM

## 2018-01-17 DIAGNOSIS — I10 ESSENTIAL HYPERTENSION, BENIGN: ICD-10-CM

## 2018-01-17 DIAGNOSIS — I10 ESSENTIAL HYPERTENSION: ICD-10-CM

## 2018-01-17 LAB — EKG IMPRESSION: NORMAL

## 2018-01-17 PROCEDURE — 93000 ELECTROCARDIOGRAM COMPLETE: CPT | Performed by: INTERNAL MEDICINE

## 2018-01-17 PROCEDURE — 99214 OFFICE O/P EST MOD 30 MIN: CPT | Performed by: INTERNAL MEDICINE

## 2018-01-17 RX ORDER — BISOPROLOL FUMARATE 5 MG/1
2.5 TABLET, FILM COATED ORAL DAILY
Qty: 15 TAB | Refills: 2 | Status: SHIPPED | OUTPATIENT
Start: 2018-01-17 | End: 2018-02-12

## 2018-01-17 RX ORDER — FUROSEMIDE 40 MG/1
40 TABLET ORAL DAILY
Qty: 60 TAB | Refills: 6 | Status: ON HOLD | COMMUNITY
Start: 2018-01-17 | End: 2018-10-09

## 2018-01-17 ASSESSMENT — ENCOUNTER SYMPTOMS
PALPITATIONS: 1
WEAKNESS: 1

## 2018-01-17 NOTE — PATIENT INSTRUCTIONS
Please start bisoprolol 2.5mg (one half pill) daily for your heart heart.  Let us know if you have any symptoms and if you do please stop this medication.    Please get non-fasting lab test at your earliest convenience.

## 2018-01-17 NOTE — PROGRESS NOTES
Cardiology Follow-up Consultation Note    Date of note:    1/17/2018    Primary Care Provider: Javon Reeves M.D.  Referring Provider: Javon Reeves M.D.    Patient Name: Dian Mendoza     YOB: 1945  MRN:              [id number]    Chief Complaint: CHF    History of Present Illness: Dian Mendoza is a 72 y.o. female whose current medical problems include idiopathic non-ischemic cardiomyopathy, last ejection fraction 45%, HFpEF, COPD, hypertension, previous pulmonary embolism found to have protein C and S deficiency and s/p previous IVC filter placement, and rhuematoid arthritis who is here for follow-up.  She is in a wheelchair due to unsteadiness and her musculoskeletal symptoms     She was last seen by Dr. Reddy on 11/8/2017.    Interim Events:  Home weights range between 135 pounds and 140 pounds.     Weight up by 5 pounds by our scale    She reports multiple chronic complaints including rare palpitations, occasional left sided chest pain she has had for years (negative cath around time symptoms started), continued orthopnea, leg swelling and leg pain, all chronic and improved from previous visit.    Her mood is much better of carvedilol or metoprolol.  Will try bisoprolol and will stop if has mood difficulties again.     She reports no significant bleeding recently.     She has significant memory and concentration issues when giving me her history.     Review of Systems   Constitution: Positive for weakness.   Cardiovascular: Positive for palpitations.       All other systems reviewed and discussed using a comprehensive questionnaire which has been scanned into [employer] as part of this appointment.            Past Medical History:   Diagnosis Date   • Anemia    • Atrial tachycardia, paroxysmal (CMS-HCC)     occasional arrythmia, has been evaluated by Dr. Reddy   • Cancer of left breast (CMS-HCC) 1997    Left breast   • Cardiomyopathy (CMS-HCC) 1/6/2015    Idiopathic with  normal coronary arteries on cath 1/6/2015    • Cholelithiasis 2014    With gallbladder wall thickening, now status post cholecystectomy   • COPD (chronic obstructive pulmonary disease) (CMS-[facility])    • E. coli sepsis (CMS-[facility])     Right buttock pressure wound   • Full dentures    • Gastritis    • Hypertension     • Hypokalemia     uncertain cause maintained with potassium replacement.   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   • Pulmonary hypertension    • Renal atrophy, right    • Rheumatoid arthritis(714.0)     historical diagnosis   • S/P insertion of IVC (inferior vena caval) filter     Prior to 2000, records not available, not retrievable.   • Seizure (CMS-[facility])     As a child   • Sleep apnea     Witnessed in hospital needs work up   • Systolic and diastolic CHF, chronic (CMS-[facility])    • TMJ (temporomandibular joint syndrome)          Past Surgical History:   Procedure Laterality Date   • VAMSHI BY LAPAROSCOPY  8/27/2014    Performed by Ajith Kearney M.D. at [facility]   • GASTROSCOPY WITH BIOPSY  12/28/2011    Performed by RAD CANTU at [facility]   • EGD W/ENDOSCOPIC ULTRASOUND  12/28/2011    Performed by RAD CANTU at [facility]   • ERCP  12/28/2011    Performed by RAD CANTU at [facility]   • RECOVERY  12/8/2010    Performed by TERRELL MCKENNA at [facility] ORS   • KNEE REPLACEMENT, TOTAL  2009    left   • ULCER DEBRIDEMENT  6/5/08    Performed by ARIELLE MITCHELL at [facility]   • FLAP CLOSURE  6/5/08     Performed by ARIELLE MITCHELL at [facility] ORS   • LUMBAR FUSION POSTERIOR  1998   • ABDOMINAL HYSTERECTOMY TOTAL  1970's   • ELBOW ORIF  1950    right   • BURSA EXCISION      bursa sac removed bilat hips   • CATARACT EXTRACTION WITH IOL Bilateral    • COLONOSCOPY  2001, 6/2010    normal   • KNEE ARTHROSCOPY      left   • MASTECTOMY      left   • OTHER ORTHOPEDIC SURGERY      BILAT FOOT   • TONSILLECTOMY           Current Outpatient Prescriptions   Medication Sig Dispense Refill   • furosemide (LASIX) 40 MG Tab Take 1 Tab by mouth every day. 60 Tab 6   • warfarin (COUMADIN) 3 MG Tab TAKE THREE (3) TABLETS BY MOUTH DAILY  90 Tab 6   • esomeprazole (NEXIUM) 40 MG delayed-release capsule Take 1 Cap by mouth every morning with breakfast. 30 Cap 6   • buPROPion SR (WELLBUTRIN-SR) 100 MG TABLET SR 12 HR Take 2 Tabs by mouth 2 Times a Day. 120 Tab 6   • levothyroxine (SYNTHROID) 125 MCG Tab Take 1 Tab by mouth Every morning on an empty stomach. 30 Tab 6   • tolterodine ER (DETROL LA) 4 MG CAPSULE SR 24 HR Take 1 Cap by mouth every day. 30 Cap 6   • ammonium lactate (LAC-HYDRIN) 12 % Lotion APPLY TO THE AFFECTED AREA TWICE DAILY 226 g 1   • ondansetron (ZOFRAN) 4 MG Tab tablet TAKE ONE TABLET BY MOUTH EVERY EIGHT HOURS AS NEEDED FOR NAUSEA / VOMITING 60 Tab 2   • lisinopril (PRINIVIL) 5 MG Tab TAKE ONE (1) TABLET BY MOUTH TWICE DAILY  60 Tab 6   • trazodone (DESYREL) 100 MG Tab Take 2 Tabs by mouth every bedtime. 60 Tab 6   • albuterol (PROAIR HFA) 108 (90 BASE) MCG/ACT Aero Soln inhalation aerosol Inhale 2 Puffs by mouth every 6 hours as needed for Shortness of Breath. 8.5 g 6   • tizanidine (ZANAFLEX) 4 MG Tab TAKE ONE (1) TABLET BY MOUTH THREE TIMES DAILY  (Patient taking differently: TAKE ONE (1) TABLET BY MOUTH THREE TIMES DAILY/PRN) 90 Tab 3   • nortriptyline (PAMELOR) 10 MG Cap TAKE ONE CAPSULE BY MOUTH NIGHTLY AT BEDTIME  30 Cap 6   • chlorpheniramine (CHLOR-TRIMETRON) 4 MG Tab Take 4 mg by mouth every 6  "hours as needed for Allergies.     • mupirocin (BACTROBAN) 2 % Ointment Apply 1 Inch to affected area(s) every day. 1 Tube 11   • morphine ER (MS CONTIN) 15 MG Tab CR tablet Take 15 mg by mouth every 12 hours.     • oxycodone-acetaminophen (PERCOCET)  MG Tab Take 1 Tab by mouth every 6 hours as needed. 30 Tab 0   • docusate sodium (COLACE) 100 MG CAPS Take 200 mg by mouth 2 times a day.     • sennosides (SENOKOT) 8.6 MG TABS Take 8.6 mg by mouth every day. Indications: Constipation, **OTC**     • Simethicone 125 MG Cap Spray 125 mg in mouth/throat as needed (gas).       No current facility-administered medications for this visit.          Allergies   Allergen Reactions   • Ezetimibe      \"I fell and hit my head but it could've been the other medications that I was taken.\"   • Metoprolol      \"I fell and hit my head but it could've been the other medications that I was taken.\"     • Other Environmental    • Spironolactone      Abrupt renal failure   • Statins [Hmg-Coa-R Inhibitors]      \"I fell and hit my head but it could've been the other medications that I was taken.\"   • Tape Hives and Rash     Plastic tape         Family History   Problem Relation Age of Onset   • Heart Disease Sister    • Heart Disease Mother    • Heart Disease Father    • Cancer Father      esophageal   • Non-contributory Brother      copd, cirrhosis, alzheimer's mild   • Heart Disease Brother      3 MIs   • GI Sister      colon polyps, precancerous   • Cancer Sister      breast X 2         Social History     Social History   • Marital status:      Spouse name: N/A   • Number of children: N/A   • Years of education: N/A     Occupational History   • Not on file.     Social History Main Topics   • Smoking status: Former Smoker     Packs/day: 1.50     Years: 40.00     Types: Cigarettes     Quit date: 1/1/1972   • Smokeless tobacco: Former User   • Alcohol use No      Comment: none since 2007   • Drug use: No   • Sexual activity: Not " "Currently     Other Topics Concern   • Not on file     Social History Narrative   • No narrative on file         Physical Exam:  Ambulatory Vitals  Blood pressure 104/62, pulse 98, resp. rate 14, height 1.753 m (5' 9\"), weight 64.9 kg (143 lb), SpO2 94 %.   Oxygen Therapy:  Pulse Oximetry: 94 %  BP Readings from Last 4 Encounters:   01/17/18 104/62   11/08/17 116/78   10/12/17 130/70   06/29/17 130/70       Weight/BMI: Body mass index is 21.12 kg/m².  Wt Readings from Last 4 Encounters:   01/17/18 64.9 kg (143 lb)   11/08/17 62.6 kg (138 lb)   10/12/17 68.9 kg (152 lb)   06/29/17 68.9 kg (152 lb)       General: Ill appearing, cachetic, anxious.   Eyes: nl conjunctiva  ENT: OP clear  Neck: JVP <8 cm H2O, no carotid bruits  Lungs: normal respiratory effort, CTAB  Heart: RRR, 2/6 mid systolic murmur at RUSB, +s3,  no rubs, trace edema bilateral lower extremities. No LV/RV heave on cardiac palpatation. 2+ bilateral radial pulses.  2+ bilateral dp pulses.   Abdomen: soft, non tender, non distended, no masses, normal bowel sounds.  No HSM.  Extremities/MSK: no clubbing, no cyanosis  Neurological: No focal sensory deficits  Psychiatric: anxious, A/O x 3  Skin: Warm extremities      Lab Data Review:  Lab Results   Component Value Date/Time    CHOLSTRLTOT 196 11/09/2017 09:42 AM     (H) 11/09/2017 09:42 AM    HDL 69 11/09/2017 09:42 AM    TRIGLYCERIDE 52 11/09/2017 09:42 AM       Lab Results   Component Value Date/Time    SODIUM 138 11/09/2017 09:42 AM    POTASSIUM 4.8 11/09/2017 09:42 AM    CHLORIDE 110 11/09/2017 09:42 AM    CO2 22 11/09/2017 09:42 AM    GLUCOSE 72 11/09/2017 09:42 AM    BUN 23 (H) 11/09/2017 09:42 AM    CREATININE 1.06 11/09/2017 09:42 AM    CREATININE 1.4 06/05/2008 01:50 PM     CrCl cannot be calculated (Patient's most recent lab result is older than the maximum 7 days allowed.).  Lab Results   Component Value Date/Time    ALKPHOSPHAT 49 11/09/2017 09:42 AM    ASTSGOT 22 11/09/2017 09:42 AM    " ALTSGPT 6 11/09/2017 09:42 AM    TBILIRUBIN 0.2 11/09/2017 09:42 AM      Lab Results   Component Value Date/Time    WBC 5.6 11/09/2017 09:42 AM    WBC 6.4 10/17/2012 09:40 AM     No results found for: HBA1C  No components found for: TROP      Cardiac Imaging and Procedures Review:    EKG dated 1/17/2018 : My personal interpretation is NSR, LBBB    Echo dated 12/7/2017:   CONCLUSIONS  PRior echo 08/24/16Normal left ventricular chamber size.  Mild concentric left ventricular hypertrophy.  Left ventricular ejection fraction is visually estimated to be 45%.  Grade I diastolic dysfunction.  Aortic sclerosis without stenosis.  Trace aortic insufficiency.  Mitral annular calcification.  Trace mitral regurgitation.  Mild tricuspid regurgitation.  Normal pericardium without effusion.      Nuclear Perfusion Imaging (1/2015):   Myocardial Perfusion   Report   IMPRESSIONS        ECG Interpretation will be added as an addendum to   this   report.   No evidence of significant jeopardized viable myocardium or prior   myocardial    infarction.   LVE.   Global hypokinesia. LVER = 27%.   Possible triple vessel disease or cardiomyopathy.      [facility] (2015):   DATE OF PROCEDURE:  01/06/2015     PREOPERATIVE DIAGNOSES:  1.  Cardiomyopathy of unknown etiology.  2.  History of protein C, protein S deficiency with history of deep vein   thrombosis and pulmonary embolism.  3.  Left bundle-branch block   4.  Past history of breast cancer.    FINDINGS:  1.  Hemodynamic LV systolic was 104-106 mmHg.  There was no gradient across the aortic valve.  Left ventricular end-diastolic pressure was 8-9 mmHg.  2.  Left ventriculography show mildly dilated left ventricle with global hypokinesis.  Visual estimation ejection fraction in the range of 25-30%.  There was 1+ mitral regurgitation.  3.  No significant coronary artery disease.  4.  The left main is a large-caliber vessel and angiographically free of disease.  It bifurcates into left anterior  descending artery and left circumflex artery.  5.  The left anterior descending artery is a large-caliber vessel, is around 3.5 mm in diameter is also quite long, extending to at least apical one-half of the inferior wall.  It gives rises to medium-sized bifurcating diagonal branch in the mid segment, and several small and short diagonal branches distally.  There is no significant stenosis noted in the left anterior   descending artery or its major branch.  Antegrade flow was normal.  6.  Left circumflex artery is a large caliber vessel is around 3.5 mm in diameter.  It gives rises to medium-sized first and second obtuse marginal branches in the proximal and distal segment and terminated into a small AV groove branch and distal third obtuse marginal branch.  There is no significant disease in the left circumflex artery or its major branches.  7.  The right coronary is a dominant system.  The right coronary artery is around 3 mm in diameter.  It gives rises to conus branch, a couple of small to medium size acute marginal branches, medium to large posterior descending artery and medium-sized posterolateral branch.  There is no significant disease noted in the stent in the right coronary or its major branches.      Radiology test Review:  CXR: 2017     1.  No acute cardiopulmonary disease.  2.  Atherosclerosis  3.  Hyperexpansion of lungs favors changes of COPD.      Medical Decision Makin. Dilated cardiomyopathy (CMS-HCC)  Stable, EF improved to 45%  -continue lisinopril  -trial of bisoprolol 2.5mg PO daily, discussed at length if side effects develop to stop medications    2. Essential hypertension  Well controlled    3. CKD (chronic kidney disease) stage 3, GFR 30-59 ml/min  Recheck labs    4. History of pulmonary embolism  Continue coumadin    5. Chronic diastolic congestive heart failure (CMS-HCC)  Appears euvolemic  -continue lasix  -lisinopril  -will consider spironolactone at next visit  -trial of  metoprolol      Return in about 3 months (around 4/17/2018).      Pedro Jacques MD  [facility], [address]  [address] [place] [id number]  Phone: [phone]  Fax: [phone]

## 2018-02-10 RX ORDER — ONDANSETRON 4 MG/1
TABLET, FILM COATED ORAL
Qty: 15 TAB | Refills: 6 | Status: SHIPPED | OUTPATIENT
Start: 2018-02-10 | End: 2018-04-10

## 2018-02-12 ENCOUNTER — OFFICE VISIT (OUTPATIENT)
Dept: MEDICAL GROUP | Facility: CLINIC | Age: 73
End: 2018-02-12
Payer: MEDICARE

## 2018-02-12 VITALS
WEIGHT: 139 LBS | TEMPERATURE: 99.3 F | RESPIRATION RATE: 16 BRPM | DIASTOLIC BLOOD PRESSURE: 74 MMHG | SYSTOLIC BLOOD PRESSURE: 122 MMHG | HEART RATE: 71 BPM | OXYGEN SATURATION: 100 % | BODY MASS INDEX: 20.59 KG/M2 | HEIGHT: 69 IN

## 2018-02-12 DIAGNOSIS — R11.2 INTRACTABLE VOMITING WITH NAUSEA, UNSPECIFIED VOMITING TYPE: ICD-10-CM

## 2018-02-12 DIAGNOSIS — J43.9 PULMONARY EMPHYSEMA, UNSPECIFIED EMPHYSEMA TYPE (HCC): ICD-10-CM

## 2018-02-12 DIAGNOSIS — Z12.31 ENCOUNTER FOR SCREENING MAMMOGRAM FOR BREAST CANCER: ICD-10-CM

## 2018-02-12 DIAGNOSIS — D68.59 PROTEIN C DEFICIENCY (HCC): ICD-10-CM

## 2018-02-12 DIAGNOSIS — F33.41 RECURRENT MAJOR DEPRESSIVE DISORDER, IN PARTIAL REMISSION (HCC): ICD-10-CM

## 2018-02-12 DIAGNOSIS — F51.01 PRIMARY INSOMNIA: ICD-10-CM

## 2018-02-12 DIAGNOSIS — I82.220 INFERIOR VENA CAVAL THROMBOSIS (HCC): ICD-10-CM

## 2018-02-12 DIAGNOSIS — M05.79 RHEUMATOID ARTHRITIS INVOLVING MULTIPLE SITES WITH POSITIVE RHEUMATOID FACTOR (HCC): ICD-10-CM

## 2018-02-12 DIAGNOSIS — I42.5 OTHER RESTRICTIVE CARDIOMYOPATHY (HCC): ICD-10-CM

## 2018-02-12 DIAGNOSIS — M62.838 MUSCLE SPASTICITY: ICD-10-CM

## 2018-02-12 DIAGNOSIS — N18.30 CKD (CHRONIC KIDNEY DISEASE) STAGE 3, GFR 30-59 ML/MIN (HCC): ICD-10-CM

## 2018-02-12 PROCEDURE — 99214 OFFICE O/P EST MOD 30 MIN: CPT | Performed by: FAMILY MEDICINE

## 2018-02-12 RX ORDER — TRAZODONE HYDROCHLORIDE 100 MG/1
200 TABLET ORAL
Qty: 60 TAB | Refills: 11 | Status: SHIPPED | OUTPATIENT
Start: 2018-02-12 | End: 2018-04-04 | Stop reason: SDUPTHER

## 2018-02-12 RX ORDER — NORTRIPTYLINE HYDROCHLORIDE 10 MG/1
10 CAPSULE ORAL
Qty: 30 CAP | Refills: 11 | Status: SHIPPED | OUTPATIENT
Start: 2018-02-12 | End: 2018-02-12 | Stop reason: SINTOL

## 2018-02-12 RX ORDER — ONDANSETRON 4 MG/1
4 TABLET, FILM COATED ORAL 2 TIMES DAILY
Qty: 60 TAB | Refills: 6 | Status: SHIPPED | OUTPATIENT
Start: 2018-02-12 | End: 2018-04-10 | Stop reason: SDUPTHER

## 2018-02-12 RX ORDER — TIZANIDINE 4 MG/1
4 TABLET ORAL 2 TIMES DAILY
Qty: 60 TAB | Refills: 11 | Status: SHIPPED | OUTPATIENT
Start: 2018-02-12 | End: 2018-04-10 | Stop reason: SDUPTHER

## 2018-02-12 RX ORDER — LISINOPRIL 5 MG/1
5 TABLET ORAL 2 TIMES DAILY
Qty: 60 TAB | Refills: 11 | Status: SHIPPED | OUTPATIENT
Start: 2018-02-12

## 2018-02-12 ASSESSMENT — PATIENT HEALTH QUESTIONNAIRE - PHQ9
5. POOR APPETITE OR OVEREATING: 2 - MORE THAN HALF THE DAYS
CLINICAL INTERPRETATION OF PHQ2 SCORE: 5
SUM OF ALL RESPONSES TO PHQ QUESTIONS 1-9: 15

## 2018-02-12 ASSESSMENT — PAIN SCALES - GENERAL: PAINLEVEL: 5=MODERATE PAIN

## 2018-02-12 NOTE — PROGRESS NOTES
Chief Complaint   Patient presents with   • Chronic Kidney Disease     6 months   • Heart Problem   • Insomnia   • Arthritis   • COPD       Subjective:     HPI:   Dian Mendoza presents today with the followin. CKD (chronic kidney disease) stage 3, GFR 30-59 ml/min  Last labs in November showed stability.  She     2. Other restrictive cardiomyopathy (CMS-[facility])  She was prescribed a low dose beta blocker by Dr. Jacques.  She never picked it up.  She had forgotten that it was prescribed.  Last EF off carvedilol but on the lisinopril bid was improved.  She has such severe depression on the beta blockers that I have asked her to avoid them all.      3. Primary insomnia  She still has major struggles with insomnia.  She continues the trazodone.  She stays asleep for around 4 to 5 hours.  She has been taking the buproprion am and also right before bed at 10 pm.  Discussed taking the second dose much earlier, mid afternoon.  Nortriptyline gives her leg weakness the next day.  She has had to stop it.     4. Muscle spasticity  She has persistent muscle spasticity and spasm.  She is using tizanidine also to help.    5. Rheumatoid arthritis involving multiple sites with positive rheumatoid factor (CMS-[facility])  Her rheumatoid symptoms have been stable lately.  She no longer sees rheumatology.  She is not on a DMAARD.    6. Protein C deficiency (CMS-[facility])  She continues her warfarin with a stable diet.  Now doing well, generally at a 2.  Was 2 today.    7. 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HCC)  Has the last one with an IVC filter.  No clots since, doing well.    8. Pulmonary emphysema, unspecified emphysema type (CMS-HCC)  This is stable, unfortunately persistent SOB.  Good oxygenation during the day on room air.  She does not have nighttime oxygen.  She did not qualify.     9. Encounter for screening mammogram for breast cancer  Order discussed and placed.    10. Recurrent major depressive disorder, in partial remission  (CMS-HCC)  She continues to ruminate and perseverate.  She struggles with depression and has for many years.  She feels much better off the carvedilol.  She has now had several good days.  Feels her quality of life is better.  Have asked her to avoid all beta blockers.     11. Intractable vomiting with nausea, unspecified vomiting type  She is having persistent daily nausea and vomiting, usually has to take two daily.  Is overdue for GI.  Referral to Dr. Anguiano placed and discussed.        Patient Active Problem List    Diagnosis Date Noted   • Mixed hyperlipidemia 10/12/2015     Priority: High   • Chronic anticoagulation 10/12/2015     Priority: High   • LBBB (left bundle branch block) 10/12/2015     Priority: High   • CKD (chronic kidney disease) stage 3, GFR 30-59 ml/min 08/23/2014     Priority: High   • History of deep vein thrombosis 08/24/2012     Priority: High   • S/P IVC filter 10/08/2011     Priority: High   • PUD (peptic ulcer disease) 10/08/2011     Priority: High   • Muscle spasticity 12/07/2010     Priority: High   • Other restrictive cardiomyopathy (CMS-HCC) 01/06/2015     Priority: Medium   • History of pulmonary embolism 04/12/2013     Priority: Medium   • Atherosclerosis of native arteries of extremity with intermittent claudication (CMS-HCC) 12/19/2011     Priority: Medium   • 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/2011     Priority: Medium   • Rheumatoid arthritis involving multiple sites with positive rheumatoid factor (CMS-HCC) 12/07/2010     Priority: Medium   • Pain management 11/18/2016     Priority: Low   • Pulmonary hypertension 01/31/2014     Priority: Low   • Anxiety 10/08/2011     Priority: Low   • Lumbar disc herniation with radiculopathy      Priority: Low   • Idiopathic atrophic hypothyroidism      Priority: Low   • Protein C deficiency (CMS-HCC) 07/16/2009     Priority: Low   • Protein S deficiency (CMS-HCC) 07/16/2009     Priority: Low   • TMJ (temporomandibular joint  syndrome) 06/19/2009     Priority: Low   • Recurrent major depressive disorder, in partial remission (CMS-HCC) 06/29/2017   • Essential hypertension, benign 06/22/2017   • Emphysema lung (CMS-HCC) 02/11/2017   • Anemia due to multiple mechanisms 09/14/2016   • 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/2016   • Vitamin D deficiency disease 02/11/2016   • Mild intermittent asthma 10/19/2015   • Cervical disc disease with myelopathy 03/11/2015   • Muscle weakness of lower extremity 03/11/2015   • MEDICAL HOME 01/12/2015   • GERD (gastroesophageal reflux disease) 09/05/2014   • Mixed urge and stress incontinence    • Chronic pain syndrome 08/21/2014   • Insomnia 03/10/2014   • History of decubitus ulcer 08/24/2012       Current medicines (including changes today)  Current Outpatient Prescriptions   Medication Sig Dispense Refill   • lisinopril (PRINIVIL) 5 MG Tab Take 1 Tab by mouth 2 times a day. 60 Tab 11   • traZODone (DESYREL) 100 MG Tab Take 2 Tabs by mouth every bedtime. 60 Tab 11   • tizanidine (ZANAFLEX) 4 MG Tab Take 1 Tab by mouth 2 times a day. 60 Tab 11   • ondansetron (ZOFRAN) 4 MG Tab tablet Take 1 Tab by mouth 2 Times a Day. 60 Tab 6   • ondansetron (ZOFRAN) 4 MG Tab tablet TAKE ONE TABLET BY MOUTH EVERY EIGHT HOURS AS NEEDED FOR NAUSEA / VOMITING 15 Tab 6   • furosemide (LASIX) 40 MG Tab Take 1 Tab by mouth every day. 60 Tab 6   • warfarin (COUMADIN) 3 MG Tab TAKE THREE (3) TABLETS BY MOUTH DAILY  90 Tab 6   • esomeprazole (NEXIUM) 40 MG delayed-release capsule Take 1 Cap by mouth every morning with breakfast. 30 Cap 6   • buPROPion SR (WELLBUTRIN-SR) 100 MG TABLET SR 12 HR Take 2 Tabs by mouth 2 Times a Day. 120 Tab 6   • levothyroxine (SYNTHROID) 125 MCG Tab Take 1 Tab by mouth Every morning on an empty stomach. 30 Tab 6   • tolterodine ER (DETROL LA) 4 MG CAPSULE SR 24 HR Take 1 Cap by mouth every day. 30 Cap 6   • ammonium lactate (LAC-HYDRIN) 12 % Lotion APPLY TO THE AFFECTED AREA TWICE DAILY 226 g 1  "  • albuterol (PROAIR HFA) 108 (90 BASE) MCG/ACT Aero Soln inhalation aerosol Inhale 2 Puffs by mouth every 6 hours as needed for Shortness of Breath. 8.5 g 6   • Simethicone 125 MG Cap Spray 125 mg in mouth/throat as needed (gas).     • chlorpheniramine (CHLOR-TRIMETRON) 4 MG Tab Take 4 mg by mouth every 6 hours as needed for Allergies.     • mupirocin (BACTROBAN) 2 % Ointment Apply 1 Inch to affected area(s) every day. 1 Tube 11   • morphine ER (MS CONTIN) 15 MG Tab CR tablet Take 15 mg by mouth every 12 hours.     • oxycodone-acetaminophen (PERCOCET)  MG Tab Take 1 Tab by mouth every 6 hours as needed. 30 Tab 0   • docusate sodium (COLACE) 100 MG CAPS Take 200 mg by mouth 2 times a day.     • sennosides (SENOKOT) 8.6 MG TABS Take 8.6 mg by mouth every day. Indications: Constipation, **OTC**       No current facility-administered medications for this visit.        Allergies   Allergen Reactions   • Beta Adrenergic Blockers      depression   • Ezetimibe      \"I fell and hit my head but it could've been the other medications that I was taken.\"   • Metoprolol      \"I fell and hit my head but it could've been the other medications that I was taken.\"     • Other Environmental    • Spironolactone      Abrupt renal failure   • Statins [Hmg-Coa-R Inhibitors]      \"I fell and hit my head but it could've been the other medications that I was taken.\"   • Tape Hives and Rash     Plastic tape       ROS: As per HPI       Objective:     Blood pressure 122/74, pulse 71, temperature 37.4 °C (99.3 °F), resp. rate 16, height 1.753 m (5' 9\"), weight 63 kg (139 lb), SpO2 100 %, not currently breastfeeding. Body mass index is 20.53 kg/m².    Physical Exam:  Constitutional: Well-developed and well-nourished. Not diaphoretic. No distress. Lucid and fluent.  Skin: Skin is warm and dry. No rash noted.  Head: Atraumatic without lesions.  Eyes: Conjunctivae and extraocular motions are normal. Pupils are equal, round, and reactive to " light. No scleral icterus.   Mouth/Throat: Tongue normal. Oropharynx is clear and moist. Posterior pharynx without erythema or exudates.  Neck: Supple, trachea midline. No thyromegaly present. No cervical or supraclavicular lymphadenopathy. No JVD or carotid bruits appreciated  Cardiovascular: Regular rate and rhythm.  Normal S1, S2 without murmur appreciated.  Chest: Effort normal. Clear to auscultation throughout, distant BS. No rales. No adventitious sounds.   Abdomen: Soft, epigastrium and mid abdomen tender, and without distention. Active bowel sounds in all four quadrants. Limited examination in chair.  Extremities: No cyanosis, clubbing, erythema, nor edema.   Neurological: Alert and oriented x 3.  She is in her motorized wheelchair.    Psychiatric:  Behavior, mood, and affect are appropriate.       Assessment and Plan:     72 y.o. female with the following issues:    1. CKD (chronic kidney disease) stage 3, GFR 30-59 ml/min  lisinopril (PRINIVIL) 5 MG Tab   2. Other restrictive cardiomyopathy (CMS-HCC)  lisinopril (PRINIVIL) 5 MG Tab   3. Primary insomnia  traZODone (DESYREL) 100 MG Tab   4. Muscle spasticity  tizanidine (ZANAFLEX) 4 MG Tab   5. Rheumatoid arthritis involving multiple sites with positive rheumatoid factor (CMS-HCC)     6. Protein C deficiency (CMS-HCC)     7. 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HCC)     8. Pulmonary emphysema, unspecified emphysema type (CMS-HCC)     9. Encounter for screening mammogram for breast cancer  MA-MAMMO SCREENING BILAT W/DALTON W/CAD   10. Recurrent major depressive disorder, in partial remission (CMS-HCC)  Patient has been identified as being depressed and appropriate orders and counseling have been given    DISCONTINUED: nortriptyline (PAMELOR) 10 MG Cap   11. Intractable vomiting with nausea, unspecified vomiting type  ondansetron (ZOFRAN) 4 MG Tab tablet    REFERRAL TO GASTROENTEROLOGY         Followup: Return in about 4 months (around 6/12/2018), or if symptoms  worsen or fail to improve.

## 2018-04-04 DIAGNOSIS — F51.01 PRIMARY INSOMNIA: ICD-10-CM

## 2018-04-04 RX ORDER — TRAZODONE HYDROCHLORIDE 100 MG/1
200 TABLET ORAL
Qty: 60 TAB | Refills: 11 | Status: SHIPPED | OUTPATIENT
Start: 2018-04-04 | End: 2018-05-22 | Stop reason: SDUPTHER

## 2018-04-10 ENCOUNTER — OFFICE VISIT (OUTPATIENT)
Dept: MEDICAL GROUP | Facility: CLINIC | Age: 73
End: 2018-04-10
Payer: MEDICARE

## 2018-04-10 VITALS
WEIGHT: 148 LBS | DIASTOLIC BLOOD PRESSURE: 66 MMHG | OXYGEN SATURATION: 95 % | HEART RATE: 70 BPM | RESPIRATION RATE: 16 BRPM | TEMPERATURE: 98.4 F | HEIGHT: 69 IN | SYSTOLIC BLOOD PRESSURE: 140 MMHG | BODY MASS INDEX: 21.92 KG/M2

## 2018-04-10 DIAGNOSIS — R11.2 NAUSEA AND VOMITING, INTRACTABILITY OF VOMITING NOT SPECIFIED, UNSPECIFIED VOMITING TYPE: ICD-10-CM

## 2018-04-10 DIAGNOSIS — M62.838 MUSCLE SPASTICITY: ICD-10-CM

## 2018-04-10 DIAGNOSIS — M25.512 CHRONIC LEFT SHOULDER PAIN: ICD-10-CM

## 2018-04-10 DIAGNOSIS — E03.4 IDIOPATHIC ATROPHIC HYPOTHYROIDISM: ICD-10-CM

## 2018-04-10 DIAGNOSIS — F33.41 RECURRENT MAJOR DEPRESSIVE DISORDER, IN PARTIAL REMISSION (HCC): ICD-10-CM

## 2018-04-10 DIAGNOSIS — Z98.890 HISTORY OF REPAIR OF RIGHT ROTATOR CUFF: ICD-10-CM

## 2018-04-10 DIAGNOSIS — G89.29 CHRONIC LEFT SHOULDER PAIN: ICD-10-CM

## 2018-04-10 DIAGNOSIS — Z12.31 ENCOUNTER FOR SCREENING MAMMOGRAM FOR BREAST CANCER: ICD-10-CM

## 2018-04-10 PROCEDURE — 99213 OFFICE O/P EST LOW 20 MIN: CPT | Performed by: FAMILY MEDICINE

## 2018-04-10 RX ORDER — LEVOTHYROXINE SODIUM 0.12 MG/1
125 TABLET ORAL
Qty: 30 TAB | Refills: 8 | Status: SHIPPED | OUTPATIENT
Start: 2018-04-10

## 2018-04-10 RX ORDER — BUPROPION HYDROCHLORIDE 300 MG/1
300 TABLET ORAL EVERY MORNING
Qty: 30 TAB | Refills: 11 | Status: SHIPPED | OUTPATIENT
Start: 2018-04-10

## 2018-04-10 RX ORDER — TIZANIDINE 4 MG/1
4 TABLET ORAL EVERY 8 HOURS PRN
Qty: 60 TAB | Refills: 11 | Status: SHIPPED | OUTPATIENT
Start: 2018-04-10 | End: 2019-07-09

## 2018-04-10 RX ORDER — ONDANSETRON 4 MG/1
4 TABLET, FILM COATED ORAL EVERY 8 HOURS PRN
Qty: 60 TAB | Refills: 11 | Status: ON HOLD | OUTPATIENT
Start: 2018-04-10 | End: 2018-10-09

## 2018-04-10 ASSESSMENT — PAIN SCALES - GENERAL: PAINLEVEL: 5=MODERATE PAIN

## 2018-04-10 ASSESSMENT — PATIENT HEALTH QUESTIONNAIRE - PHQ9
SUM OF ALL RESPONSES TO PHQ QUESTIONS 1-9: 18
5. POOR APPETITE OR OVEREATING: 1 - SEVERAL DAYS
CLINICAL INTERPRETATION OF PHQ2 SCORE: 5

## 2018-04-10 NOTE — PROGRESS NOTES
Chief Complaint   Patient presents with   • Shoulder Pain   • Depression       Subjective:     HPI:   Dian Mendoza presents today with the followin. Chronic left shoulder pain  She has had this now a little under three months.  No trauma associated.  She thought she had strained a muscle but the pain and stiffness persist.    2. History of repair of right rotator cuff  This was over 20 years ago.  They were not fully able to repair.  However it is doing okay.  The left is what is causing the pain.    3. Recurrent major depressive disorder, in partial remission (CMS-[facility])  Here for: depression, anxiety disorder.    Current symptoms include: palpitations, sweating, insomnia, feelings of losing control, difficulty concentrating,  depressed mood, insomnia, fatigue, feelings of worthlessness/guilt, difficulty concentrating and hopelessness  Since last OV, symptoms are slightly better.  However, she feels the bupropion is not working as well as in the past.  Discussed dose increase and changing to long acting.  She is taking medicine daily as directed. Side effects: Dry mouth.  Mood currently does affect: relationships, social activities.  Denies agoraphobia, panic attacks, SI/HI. (Denies wishing to be dead, thinking about death, intent to commit suicide, previous suicide attempt)     Review Of Systems  Taking supplements to help mood or symptoms: no  Using alcohol or other substances to help mood or symptoms: no  No polydipsia, polyuria, temperature intolerance, bowel changes  No visual or auditory hallucinations. Denies symptoms of ludmila: grandiosity, euphoria, need for little or no sleep, rapid pressured speech, spending sprees, reckless or risky behavior, hypersexual behavior.   Pertinent  ROS findings as above. All other systems reviewed and are negative.    4. Encounter for screening mammogram for breast cancer  Order discussed and placed    5. Idiopathic atrophic hypothyroidism  Patient reports good  energy level on the medication. Patient denies insomnia, tremor or change in appetite.  Patient is taking the medication on an empty stomach in the morning and waiting at least 30 minutes before eating.  Last TSH in November was slightly suppressed.  Patient states she does well at this dose.    6. Muscle spasticity  She feels the tizanidine continues to be helpful.      7. Nausea and vomiting, intractability of vomiting not specified, unspecified vomiting type  The ondansetron is helpful.  Rewrote the medication.        Patient Active Problem List    Diagnosis Date Noted   • Mixed hyperlipidemia 10/12/2015     Priority: High   • Chronic anticoagulation 10/12/2015     Priority: High   • LBBB (left bundle branch block) 10/12/2015     Priority: High   • CKD (chronic kidney disease) stage 3, GFR 30-59 ml/min 08/23/2014     Priority: High   • History of deep vein thrombosis 08/24/2012     Priority: High   • S/P IVC filter 10/08/2011     Priority: High   • PUD (peptic ulcer disease) 10/08/2011     Priority: High   • Muscle spasticity 12/07/2010     Priority: High   • Other restrictive cardiomyopathy (CMS-HCC) 01/06/2015     Priority: Medium   • History of pulmonary embolism 04/12/2013     Priority: Medium   • Atherosclerosis of native arteries of extremity with intermittent claudication (CMS-HCC) 12/19/2011     Priority: Medium   • 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/2011     Priority: Medium   • Rheumatoid arthritis involving multiple sites with positive rheumatoid factor (CMS-HCC) 12/07/2010     Priority: Medium   • Pain management 11/18/2016     Priority: Low   • Pulmonary hypertension 01/31/2014     Priority: Low   • Anxiety 10/08/2011     Priority: Low   • Lumbar disc herniation with radiculopathy      Priority: Low   • Idiopathic atrophic hypothyroidism      Priority: Low   • Protein C deficiency (CMS-HCC) 07/16/2009     Priority: Low   • Protein S deficiency (CMS-HCC) 07/16/2009     Priority: Low   •  TMJ (temporomandibular joint syndrome) 06/19/2009     Priority: Low   • History of repair of right rotator cuff 04/10/2018   • Recurrent major depressive disorder, in partial remission (CMS-HCC) 06/29/2017   • Essential hypertension, benign 06/22/2017   • Emphysema lung (CMS-HCC) 02/11/2017   • Anemia due to multiple mechanisms 09/14/2016   • 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/2016   • Vitamin D deficiency disease 02/11/2016   • Mild intermittent asthma 10/19/2015   • Cervical disc disease with myelopathy 03/11/2015   • Muscle weakness of lower extremity 03/11/2015   • MEDICAL HOME 01/12/2015   • GERD (gastroesophageal reflux disease) 09/05/2014   • Mixed urge and stress incontinence    • Chronic pain syndrome 08/21/2014   • Insomnia 03/10/2014   • History of decubitus ulcer 08/24/2012       Current medicines (including changes today)  Current Outpatient Prescriptions   Medication Sig Dispense Refill   • levothyroxine (SYNTHROID) 125 MCG Tab Take 1 Tab by mouth Every morning on an empty stomach. 30 Tab 8   • buPROPion (WELLBUTRIN XL) 300 MG XL tablet Take 1 Tab by mouth every morning. 30 Tab 11   • tizanidine (ZANAFLEX) 4 MG Tab Take 1 Tab by mouth every 8 hours as needed (nausea and vomiting). 60 Tab 11   • ondansetron (ZOFRAN) 4 MG Tab tablet Take 1 Tab by mouth every 8 hours as needed for Nausea/Vomiting. 60 Tab 11   • traZODone (DESYREL) 100 MG Tab Take 2 Tabs by mouth every bedtime. 60 Tab 11   • lisinopril (PRINIVIL) 5 MG Tab Take 1 Tab by mouth 2 times a day. 60 Tab 11   • furosemide (LASIX) 40 MG Tab Take 1 Tab by mouth every day. 60 Tab 6   • warfarin (COUMADIN) 3 MG Tab TAKE THREE (3) TABLETS BY MOUTH DAILY  90 Tab 6   • esomeprazole (NEXIUM) 40 MG delayed-release capsule Take 1 Cap by mouth every morning with breakfast. 30 Cap 6   • tolterodine ER (DETROL LA) 4 MG CAPSULE SR 24 HR Take 1 Cap by mouth every day. 30 Cap 6   • ammonium lactate (LAC-HYDRIN) 12 % Lotion APPLY TO THE AFFECTED AREA TWICE  "DAILY 226 g 1   • albuterol (PROAIR HFA) 108 (90 BASE) MCG/ACT Aero Soln inhalation aerosol Inhale 2 Puffs by mouth every 6 hours as needed for Shortness of Breath. 8.5 g 6   • Simethicone 125 MG Cap Spray 125 mg in mouth/throat as needed (gas).     • chlorpheniramine (CHLOR-TRIMETRON) 4 MG Tab Take 4 mg by mouth every 6 hours as needed for Allergies.     • mupirocin (BACTROBAN) 2 % Ointment Apply 1 Inch to affected area(s) every day. 1 Tube 11   • morphine ER (MS CONTIN) 15 MG Tab CR tablet Take 15 mg by mouth every 12 hours.     • oxycodone-acetaminophen (PERCOCET)  MG Tab Take 1 Tab by mouth every 6 hours as needed. 30 Tab 0   • docusate sodium (COLACE) 100 MG CAPS Take 200 mg by mouth 2 times a day.     • sennosides (SENOKOT) 8.6 MG TABS Take 8.6 mg by mouth every day. Indications: Constipation, **OTC**       No current facility-administered medications for this visit.        Allergies   Allergen Reactions   • Beta Adrenergic Blockers      depression   • Ezetimibe      \"I fell and hit my head but it could've been the other medications that I was taken.\"   • Metoprolol      \"I fell and hit my head but it could've been the other medications that I was taken.\"     • Other Environmental    • Spironolactone      Abrupt renal failure   • Statins [Hmg-Coa-R Inhibitors]      \"I fell and hit my head but it could've been the other medications that I was taken.\"   • Tape Hives and Rash     Plastic tape       ROS: As per HPI       Objective:     Blood pressure 140/66, pulse 70, temperature 36.9 °C (98.4 °F), resp. rate 16, height 1.753 m (5' 9\"), weight 67.1 kg (148 lb), SpO2 95 %, not currently breastfeeding. Body mass index is 21.86 kg/m².    Physical Exam:  Constitutional: Well-developed and well-nourished. Not diaphoretic. No distress. Lucid and fluent.  Skin: Skin is warm and dry. No rash noted.  Head: Atraumatic without lesions.  Eyes: Conjunctivae and extraocular motions are normal. Pupils are equal, round, and " reactive to light. No scleral icterus.   Mouth/Throat: Tongue normal. Oropharynx is clear and moist. Posterior pharynx without erythema or exudates.  Neck: Supple, trachea midline. No thyromegaly present. No cervical or supraclavicular lymphadenopathy. No JVD or carotid bruits appreciated  Cardiovascular: Regular rate and rhythm to auscultation.  Normal S1, S2 without murmur appreciated.  Chest: Effort normal. Clear to auscultation throughout. No adventitious sounds. No rales appreciated today.  Extremities: No cyanosis, clubbing, erythema, nor edema. Left shoulder motion is restricted by pain both in abduction, extension and flexion. No heat or erythema appreciated.  No significant edema.  Neurological: Alert and oriented x 3. DTRs 2+/3 and symmetric. No cranial nerve deficit.  Psychiatric:  Behavior, mood, and affect are appropriate.       Assessment and Plan:     72 y.o. female with the following issues:    1. Chronic left shoulder pain  REFERRAL TO ORTHOPEDICS   2. History of repair of right rotator cuff  REFERRAL TO ORTHOPEDICS   3. Recurrent major depressive disorder, in partial remission (CMS-[facility])  Patient has been identified as being depressed and appropriate orders and counseling have been given    buPROPion (WELLBUTRIN XL) 300 MG XL tablet   4. Encounter for screening mammogram for breast cancer  MA-MAMMO SCREENING BILAT W/[place] W/CAD   5. Idiopathic atrophic hypothyroidism  levothyroxine (SYNTHROID) 125 MCG Tab   6. Muscle spasticity  tizanidine (ZANAFLEX) 4 MG Tab   7. Nausea and vomiting, intractability of vomiting not specified, unspecified vomiting type  ondansetron (ZOFRAN) 4 MG Tab tablet         Followup: Return in about 3 months (around 7/10/2018), or if symptoms worsen or fail to improve.

## 2018-04-19 ENCOUNTER — TELEPHONE (OUTPATIENT)
Dept: CARDIOLOGY | Facility: MEDICAL CENTER | Age: 73
End: 2018-04-19

## 2018-04-25 ENCOUNTER — APPOINTMENT (OUTPATIENT)
Dept: RADIOLOGY | Facility: MEDICAL CENTER | Age: 73
End: 2018-04-25
Attending: FAMILY MEDICINE
Payer: MEDICARE

## 2018-05-15 ENCOUNTER — OFFICE VISIT (OUTPATIENT)
Dept: MEDICAL GROUP | Facility: CLINIC | Age: 73
End: 2018-05-15
Payer: MEDICARE

## 2018-05-15 VITALS
DIASTOLIC BLOOD PRESSURE: 64 MMHG | RESPIRATION RATE: 16 BRPM | OXYGEN SATURATION: 96 % | TEMPERATURE: 99.3 F | HEIGHT: 69 IN | WEIGHT: 151 LBS | HEART RATE: 77 BPM | BODY MASS INDEX: 22.36 KG/M2 | SYSTOLIC BLOOD PRESSURE: 110 MMHG

## 2018-05-15 DIAGNOSIS — F33.41 RECURRENT MAJOR DEPRESSIVE DISORDER, IN PARTIAL REMISSION (HCC): ICD-10-CM

## 2018-05-15 DIAGNOSIS — M25.512 ARTHRALGIA OF LEFT ACROMIOCLAVICULAR JOINT: ICD-10-CM

## 2018-05-15 DIAGNOSIS — Z79.01 CHRONIC ANTICOAGULATION: ICD-10-CM

## 2018-05-15 DIAGNOSIS — D68.59 PROTEIN S DEFICIENCY (HCC): ICD-10-CM

## 2018-05-15 DIAGNOSIS — D68.59 PROTEIN C DEFICIENCY (HCC): ICD-10-CM

## 2018-05-15 DIAGNOSIS — M75.02 ADHESIVE CAPSULITIS OF LEFT SHOULDER: ICD-10-CM

## 2018-05-15 PROCEDURE — 99213 OFFICE O/P EST LOW 20 MIN: CPT | Performed by: FAMILY MEDICINE

## 2018-05-15 RX ORDER — KETOROLAC TROMETHAMINE 30 MG/ML
60 INJECTION, SOLUTION INTRAMUSCULAR; INTRAVENOUS ONCE
Status: COMPLETED | OUTPATIENT
Start: 2018-05-15 | End: 2018-05-15

## 2018-05-15 RX ORDER — WARFARIN SODIUM 3 MG/1
6 TABLET ORAL DAILY
Qty: 90 TAB | Refills: 6 | Status: ON HOLD | OUTPATIENT
Start: 2018-05-15 | End: 2019-07-17

## 2018-05-15 RX ADMIN — KETOROLAC TROMETHAMINE 60 MG: 30 INJECTION, SOLUTION INTRAMUSCULAR; INTRAVENOUS at 11:50

## 2018-05-15 NOTE — PROGRESS NOTES
Chief Complaint   Patient presents with   • Shoulder Pain     left shoulder pain   • Depression       Subjective:     HPI:   Dian Mendoza presents today with the followin. Adhesive capsulitis of left shoulder  She is having considerable pain and limitation of the left shoulder.    2. Arthralgia of left acromioclavicular joint  She is having pain at the point of the shoulder.  She also has limited ROM and tenderness.  She will be seeing orthopedics.  Toradol only today.  - Ketorolac Tromethamine    3. Recurrent major depressive disorder, in partial remission (HCC)  Current PHQ score is 13, moderate depression.  Her son is living with her as caretaker but now often has to be out of town for his work.  She feels it is working overall.  Denies SI or plan.  Occasional morbid thoughts.    Renewal on her warfarin is placed.  Using her home gadget every 2 weeks, protimes have been between 2.0 and 3.0.      Patient Active Problem List    Diagnosis Date Noted   • Mixed hyperlipidemia 10/12/2015     Priority: High   • Chronic anticoagulation 10/12/2015     Priority: High   • LBBB (left bundle branch block) 10/12/2015     Priority: High   • CKD (chronic kidney disease) stage 3, GFR 30-59 ml/min 2014     Priority: High   • History of deep vein thrombosis 2012     Priority: High   • S/P IVC filter 10/08/2011     Priority: High   • PUD (peptic ulcer disease) 10/08/2011     Priority: High   • Muscle spasticity 2010     Priority: High   • Other restrictive cardiomyopathy ([facility]) 2015     Priority: Medium   • History of pulmonary embolism 2013     Priority: Medium   • Atherosclerosis of native arteries of extremity with intermittent claudication ([facility]) 2011     Priority: Medium   • Inferior vena caval thrombosis ([facility]) 2011     Priority: Medium   • Rheumatoid arthritis involving multiple sites with positive rheumatoid factor ([facility]) 2010     Priority: Medium   • Pain management  11/18/2016     Priority: Low   • Pulmonary hypertension ([facility]) 01/31/2014     Priority: Low   • Anxiety 10/08/2011     Priority: Low   • Lumbar disc herniation with radiculopathy      Priority: Low   • Idiopathic atrophic hypothyroidism      Priority: Low   • Protein C deficiency ([facility]) 07/16/2009     Priority: Low   • Protein S deficiency ([facility]) 07/16/2009     Priority: Low   • TMJ (temporomandibular joint syndrome) 06/19/2009     Priority: Low   • History of repair of right rotator cuff 04/10/2018   • Recurrent major depressive disorder, in partial remission ([facility]) 06/29/2017   • Essential hypertension, benign 06/22/2017   • Emphysema lung ([facility]) 02/11/2017   • Anemia due to multiple mechanisms 09/14/2016   • Delusions of parasitosis ([facility]) 08/23/2016   • Vitamin D deficiency disease 02/11/2016   • Mild intermittent asthma 10/19/2015   • Cervical disc disease with myelopathy 03/11/2015   • Muscle weakness of lower extremity 03/11/2015   • MEDICAL HOME 01/12/2015   • GERD (gastroesophageal reflux disease) 09/05/2014   • Mixed urge and stress incontinence    • Chronic pain syndrome 08/21/2014   • Insomnia 03/10/2014   • History of decubitus ulcer 08/24/2012       Current medicines (including changes today)  Current Outpatient Prescriptions   Medication Sig Dispense Refill   • levothyroxine (SYNTHROID) 125 MCG Tab Take 1 Tab by mouth Every morning on an empty stomach. 30 Tab 8   • buPROPion (WELLBUTRIN XL) 300 MG XL tablet Take 1 Tab by mouth every morning. 30 Tab 11   • tizanidine (ZANAFLEX) 4 MG Tab Take 1 Tab by mouth every 8 hours as needed (nausea and vomiting). 60 Tab 11   • traZODone (DESYREL) 100 MG Tab Take 2 Tabs by mouth every bedtime. 60 Tab 11   • lisinopril (PRINIVIL) 5 MG Tab Take 1 Tab by mouth 2 times a day. 60 Tab 11   • furosemide (LASIX) 40 MG Tab Take 1 Tab by mouth every day. 60 Tab 6   • warfarin (COUMADIN) 3 MG Tab TAKE THREE (3) TABLETS BY MOUTH DAILY  90 Tab 6   • esomeprazole (NEXIUM) 40 MG  "delayed-release capsule Take 1 Cap by mouth every morning with breakfast. 30 Cap 6   • tolterodine ER (DETROL LA) 4 MG CAPSULE SR 24 HR Take 1 Cap by mouth every day. 30 Cap 6   • morphine ER (MS CONTIN) 15 MG Tab CR tablet Take 15 mg by mouth every 12 hours.     • oxycodone-acetaminophen (PERCOCET)  MG Tab Take 1 Tab by mouth every 6 hours as needed. 30 Tab 0   • docusate sodium (COLACE) 100 MG CAPS Take 200 mg by mouth 2 times a day.     • ondansetron (ZOFRAN) 4 MG Tab tablet Take 1 Tab by mouth every 8 hours as needed for Nausea/Vomiting. 60 Tab 11   • ammonium lactate (LAC-HYDRIN) 12 % Lotion APPLY TO THE AFFECTED AREA TWICE DAILY 226 g 1   • albuterol (PROAIR HFA) 108 (90 BASE) MCG/ACT Aero Soln inhalation aerosol Inhale 2 Puffs by mouth every 6 hours as needed for Shortness of Breath. 8.5 g 6   • Simethicone 125 MG Cap Spray 125 mg in mouth/throat as needed (gas).     • chlorpheniramine (CHLOR-TRIMETRON) 4 MG Tab Take 4 mg by mouth every 6 hours as needed for Allergies.     • mupirocin (BACTROBAN) 2 % Ointment Apply 1 Inch to affected area(s) every day. 1 Tube 11   • sennosides (SENOKOT) 8.6 MG TABS Take 8.6 mg by mouth every day. Indications: Constipation, **OTC**       Current Facility-Administered Medications   Medication Dose Route Frequency Provider Last Rate Last Dose   • ketorolac (TORADOL) injection 60 mg  60 mg Intramuscular Once Javon Reeves M.D.           Allergies   Allergen Reactions   • Beta Adrenergic Blockers      depression   • Ezetimibe      \"I fell and hit my head but it could've been the other medications that I was taken.\"   • Metoprolol      \"I fell and hit my head but it could've been the other medications that I was taken.\"     • Other Environmental    • Spironolactone      Abrupt renal failure   • Statins [Hmg-Coa-R Inhibitors]      \"I fell and hit my head but it could've been the other medications that I was taken.\"   • Tape Hives and Rash     Plastic tape       ROS: As per " "HPI       Objective:     Blood pressure 110/64, pulse 77, temperature 37.4 °C (99.3 °F), resp. rate 16, height 1.753 m (5' 9\"), weight 68.5 kg (151 lb), SpO2 96 %. Body mass index is 22.3 kg/m².    Physical Exam:  Constitutional: Well-developed and well-nourished. Not diaphoretic. No distress. Lucid and fluent.  Skin: Skin is warm and dry. No rash noted.  Head: Atraumatic without lesions.  Eyes: Conjunctivae and extraocular motions are normal. Pupils are equal, round, and reactive to light. No scleral icterus.   Neck: kyphosis with forward position.  ROM is limited in extension. No thyromegaly present. No cervical or supraclavicular lymphadenopathy. No JVD or carotid bruits appreciated  Cardiovascular: Regular rate and rhythm.  Normal S1, S2 without murmur appreciated.  Chest: Effort normal. Clear to auscultation throughout. No adventitious sounds.   Extremities: No cyanosis, clubbing, erythema, nor edema. Left shoulder ROM is considerably compromised.  Neurological: Alert and oriented x 3. Gait is slow and antalgic, uses her four wheeled walker.  Psychiatric:  Behavior, mood, and affect are appropriate.    Depression Screen (PHQ-2/PHQ-9) 10/12/2017 2/12/2018 4/10/2018   PHQ-2 Total Score - - -   PHQ-2 Total Score - - -   PHQ-2 Total Score - - -   PHQ-2 Total Score 0 5 5   PHQ-9 Total Score - - -   PHQ-9 Total Score - - -   PHQ-9 Total Score - 15 18       Interpretation of PHQ-9 Total Score   Score Severity   1-4 No Depression   5-9 Mild Depression   10-14 Moderate Depression   15-19 Moderately Severe Depression   20-27 Severe Depression       Assessment and Plan:     72 y.o. female with the following issues:    1. Adhesive capsulitis of left shoulder     2. Arthralgia of left acromioclavicular joint  ketorolac (TORADOL) injection 60 mg   3. Recurrent major depressive disorder, in partial remission (HCC)           Followup: Return in about 4 months (around 9/15/2018), or if symptoms worsen or fail to improve.  "

## 2018-05-22 DIAGNOSIS — F51.01 PRIMARY INSOMNIA: ICD-10-CM

## 2018-05-22 RX ORDER — TRAZODONE HYDROCHLORIDE 100 MG/1
200 TABLET ORAL
Qty: 60 TAB | Refills: 11 | Status: SHIPPED | OUTPATIENT
Start: 2018-05-22 | End: 2019-07-09

## 2018-06-19 DIAGNOSIS — I50.32 CHRONIC DIASTOLIC CONGESTIVE HEART FAILURE (HCC): ICD-10-CM

## 2018-06-19 DIAGNOSIS — I42.0 DILATED CARDIOMYOPATHY (HCC): ICD-10-CM

## 2018-06-19 RX ORDER — BISOPROLOL FUMARATE 5 MG/1
2.5 TABLET, FILM COATED ORAL DAILY
Qty: 45 TAB | Refills: 3 | Status: SHIPPED | OUTPATIENT
Start: 2018-06-19

## 2018-06-19 NOTE — TELEPHONE ENCOUNTER
Upon chart review, last OV notes from pt's PCP-Dr Reeves 2/12/18    2. Other restrictive cardiomyopathy (CMS-HCC)  She was prescribed a low dose beta blocker by Dr. Jacques.  She never picked it up.  She had forgotten that it was prescribed.  Last EF off carvedilol but on the lisinopril bid was improved.  She has such severe depression on the beta blockers that I have asked her to avoid them all.       Called pt to clarify, per pt she is taking Bisoprolol is actually feeling better since she's been taking it, denies any side effects and requested to please approved refill Rx.     Refill Rx approved.

## 2018-06-26 ENCOUNTER — HOSPITAL ENCOUNTER (OUTPATIENT)
Dept: CARDIOLOGY | Facility: MEDICAL CENTER | Age: 73
End: 2018-06-26
Attending: NURSE PRACTITIONER
Payer: MEDICARE

## 2018-06-26 ENCOUNTER — HOSPITAL ENCOUNTER (OUTPATIENT)
Dept: RADIOLOGY | Facility: MEDICAL CENTER | Age: 73
End: 2018-06-26
Attending: NURSE PRACTITIONER
Payer: MEDICARE

## 2018-06-26 ENCOUNTER — HOSPITAL ENCOUNTER (OUTPATIENT)
Dept: LAB | Facility: MEDICAL CENTER | Age: 73
End: 2018-06-26
Attending: NURSE PRACTITIONER
Payer: MEDICARE

## 2018-06-26 ENCOUNTER — OFFICE VISIT (OUTPATIENT)
Dept: CARDIOLOGY | Facility: MEDICAL CENTER | Age: 73
End: 2018-06-26
Payer: MEDICARE

## 2018-06-26 VITALS
OXYGEN SATURATION: 100 % | HEART RATE: 72 BPM | SYSTOLIC BLOOD PRESSURE: 122 MMHG | DIASTOLIC BLOOD PRESSURE: 70 MMHG | BODY MASS INDEX: 21.92 KG/M2 | WEIGHT: 148 LBS | HEIGHT: 69 IN

## 2018-06-26 DIAGNOSIS — Z86.711 HISTORY OF PULMONARY EMBOLISM: ICD-10-CM

## 2018-06-26 DIAGNOSIS — I42.5 OTHER RESTRICTIVE CARDIOMYOPATHY (HCC): ICD-10-CM

## 2018-06-26 DIAGNOSIS — Z79.01 CHRONIC ANTICOAGULATION: ICD-10-CM

## 2018-06-26 DIAGNOSIS — R07.89 OTHER CHEST PAIN: ICD-10-CM

## 2018-06-26 DIAGNOSIS — Z95.828 S/P IVC FILTER: ICD-10-CM

## 2018-06-26 DIAGNOSIS — E78.2 MIXED HYPERLIPIDEMIA: ICD-10-CM

## 2018-06-26 DIAGNOSIS — G89.29 CHRONIC LEFT SHOULDER PAIN: ICD-10-CM

## 2018-06-26 DIAGNOSIS — I44.7 LBBB (LEFT BUNDLE BRANCH BLOCK): ICD-10-CM

## 2018-06-26 DIAGNOSIS — N18.30 CKD (CHRONIC KIDNEY DISEASE) STAGE 3, GFR 30-59 ML/MIN (HCC): ICD-10-CM

## 2018-06-26 DIAGNOSIS — J45.20 MILD INTERMITTENT ASTHMA WITHOUT COMPLICATION: ICD-10-CM

## 2018-06-26 DIAGNOSIS — M25.512 CHRONIC LEFT SHOULDER PAIN: ICD-10-CM

## 2018-06-26 LAB
ANION GAP SERPL CALC-SCNC: 6 MMOL/L (ref 0–11.9)
BASOPHILS # BLD AUTO: 0.9 % (ref 0–1.8)
BASOPHILS # BLD: 0.06 K/UL (ref 0–0.12)
BNP SERPL-MCNC: 58 PG/ML (ref 0–100)
BUN SERPL-MCNC: 24 MG/DL (ref 8–22)
CALCIUM SERPL-MCNC: 8.8 MG/DL (ref 8.5–10.5)
CHLORIDE SERPL-SCNC: 107 MMOL/L (ref 96–112)
CO2 SERPL-SCNC: 22 MMOL/L (ref 20–33)
CREAT SERPL-MCNC: 1.43 MG/DL (ref 0.5–1.4)
EKG IMPRESSION: NORMAL
EOSINOPHIL # BLD AUTO: 0.4 K/UL (ref 0–0.51)
EOSINOPHIL NFR BLD: 6.1 % (ref 0–6.9)
ERYTHROCYTE [DISTWIDTH] IN BLOOD BY AUTOMATED COUNT: 48.5 FL (ref 35.9–50)
GLUCOSE SERPL-MCNC: 80 MG/DL (ref 65–99)
HCT VFR BLD AUTO: 32 % (ref 37–47)
HGB BLD-MCNC: 10.4 G/DL (ref 12–16)
IMM GRANULOCYTES # BLD AUTO: 0.01 K/UL (ref 0–0.11)
IMM GRANULOCYTES NFR BLD AUTO: 0.2 % (ref 0–0.9)
LV EJECT FRACT  99904: 50
LV EJECT FRACT MOD 2C 99903: 55.21
LV EJECT FRACT MOD 4C 99902: 56.98
LV EJECT FRACT MOD BP 99901: 56.36
LYMPHOCYTES # BLD AUTO: 1.65 K/UL (ref 1–4.8)
LYMPHOCYTES NFR BLD: 25 % (ref 22–41)
MCH RBC QN AUTO: 30.7 PG (ref 27–33)
MCHC RBC AUTO-ENTMCNC: 32.5 G/DL (ref 33.6–35)
MCV RBC AUTO: 94.4 FL (ref 81.4–97.8)
MONOCYTES # BLD AUTO: 0.38 K/UL (ref 0–0.85)
MONOCYTES NFR BLD AUTO: 5.8 % (ref 0–13.4)
NEUTROPHILS # BLD AUTO: 4.1 K/UL (ref 2–7.15)
NEUTROPHILS NFR BLD: 62 % (ref 44–72)
NRBC # BLD AUTO: 0 K/UL
NRBC BLD-RTO: 0 /100 WBC
PLATELET # BLD AUTO: 391 K/UL (ref 164–446)
PMV BLD AUTO: 11.1 FL (ref 9–12.9)
POTASSIUM SERPL-SCNC: 4.3 MMOL/L (ref 3.6–5.5)
RBC # BLD AUTO: 3.39 M/UL (ref 4.2–5.4)
SODIUM SERPL-SCNC: 135 MMOL/L (ref 135–145)
WBC # BLD AUTO: 6.6 K/UL (ref 4.8–10.8)

## 2018-06-26 PROCEDURE — 80048 BASIC METABOLIC PNL TOTAL CA: CPT

## 2018-06-26 PROCEDURE — 93000 ELECTROCARDIOGRAM COMPLETE: CPT | Performed by: NURSE PRACTITIONER

## 2018-06-26 PROCEDURE — 93306 TTE W/DOPPLER COMPLETE: CPT

## 2018-06-26 PROCEDURE — 85025 COMPLETE CBC W/AUTO DIFF WBC: CPT

## 2018-06-26 PROCEDURE — 99214 OFFICE O/P EST MOD 30 MIN: CPT | Performed by: NURSE PRACTITIONER

## 2018-06-26 PROCEDURE — 73030 X-RAY EXAM OF SHOULDER: CPT | Mod: LT

## 2018-06-26 PROCEDURE — 83880 ASSAY OF NATRIURETIC PEPTIDE: CPT

## 2018-06-26 PROCEDURE — 36415 COLL VENOUS BLD VENIPUNCTURE: CPT

## 2018-06-26 ASSESSMENT — ENCOUNTER SYMPTOMS
PALPITATIONS: 0
BRUISES/BLEEDS EASILY: 0
HEADACHES: 0
NAUSEA: 0
COUGH: 0
ORTHOPNEA: 0
MYALGIAS: 0
LOSS OF CONSCIOUSNESS: 0
PND: 0
FEVER: 0
ABDOMINAL PAIN: 0
SHORTNESS OF BREATH: 1
CHILLS: 0
DIZZINESS: 0

## 2018-06-26 NOTE — LETTER
[facility]   [address]   [address]   [place] [address]  Phone: [phone]  Fax: [phone]              Dian Mendoza  1945    Encounter Date: 6/26/2018    BRITTA Vaughan          PROGRESS NOTE:  Chief Complaint   Patient presents with   • Follow-Up   • Cardiomyopathy (Non-ischemic)   • COPD   • HTN (Controlled)   • Anticoagulation       Subjective:   Dian Mendoza is a 72 y.o. female who presents today for four month follow-up of non-ischemic cardiomyopathy, COPD, hypertension, hyperlipidemia, and history of previous PE with protein C and S deficiency on Coumadin.    Dian is a 72 year old female with history of Protein C & S deficiencies and history of DVT, anticoagulated with Coumadin, non-ischemic cardiomyopathy with LVEF 45%, pulmonary hypertension, LBBB, hypertension, hyperlipidemia and arthritis with severe back pain.     At last follow-up, low dose Bisoprolol was added, but this made her depression worse. She has since stopped it. She does not feel well today; she feels very short of breath and tired; she also complains of some left to right sided chest pain. She is also having dizziness, but no syncope. She also fell and is having left shoulder pain. She cannot lie flat; mild, stable LE edema.    Past Medical History:   Diagnosis Date   • Anemia    • Atrial tachycardia, paroxysmal (HCC)     occasional arrythmia, has been evaluated by Dr. Reddy   • Cancer of left breast (HCC) 1997    Left breast   • Cardiomyopathy (HCC) 1/6/2015    Idiopathic with normal coronary arteries on cath 1/6/2015    • Cholelithiasis 2014    With gallbladder wall thickening, now status post cholecystectomy   • COPD (chronic obstructive pulmonary disease) (HCC)    • E. coli sepsis (HCC)     Right buttock pressure wound   • Full dentures    • Gastritis    • Hypertension     • Hypokalemia     uncertain cause maintained with potassium  replacement.   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   • Pulmonary hypertension (HCC)    • Renal atrophy, right    • Rheumatoid arthritis(714.0)     historical diagnosis   • S/P insertion of IVC (inferior vena caval) filter     Prior to 2000, records not available, not retrievable.   • Seizure (HCC)     As a child   • Sleep apnea     Witnessed in hospital needs work up   • Systolic and diastolic CHF, chronic (HCC)    • TMJ (temporomandibular joint syndrome)      Past Surgical History:   Procedure Laterality Date   • VAMSHI BY LAPAROSCOPY  8/27/2014    Performed by Ajith Kearney M.D. at SUR[facility]ERY [facility]   • GASTROSCOPY WITH BIOPSY  12/28/2011    Performed by RAD CANTU at [facility]   • EGD W/ENDOSCOPIC ULTRASOUND  12/28/2011    Performed by RAD CANTU at [facility]   • ERCP  12/28/2011    Performed by RAD CANTU at [facility]   • RECOVERY  12/8/2010    Performed by TERRELL MCKENNA at [facility]   • KNEE REPLACEMENT, TOTAL  2009    left   • ULCER DEBRIDEMENT  6/5/08    Performed by ARIELLE MITCHELL at SURGERY [facility]   • FLAP CLOSURE  6/5/08    Performed by ARIELLE MITCHELL at [facility]   • LUMBAR FUSION POSTERIOR  1998   • ABDOMINAL HYSTERECTOMY TOTAL  1970's   • ELBOW ORIF  1950    right   • BURSA EXCISION      bursa sac removed bilat hips   • CATARACT EXTRACTION WITH IOL Bilateral    • COLONOSCOPY  2001, 6/2010    normal   • KNEE ARTHROSCOPY      left   • MASTECTOMY      left   • OTHER ORTHOPEDIC SURGERY      BILAT FOOT   "  • TONSILLECTOMY       Family History   Problem Relation Age of Onset   • Heart Disease Sister    • Heart Disease Mother    • Heart Disease Father    • Cancer Father      esophageal   • Non-contributory Brother      copd, cirrhosis, alzheimer's mild   • Heart Disease Brother      3 MIs   • GI Sister      colon polyps, precancerous   • Cancer Sister      breast X 2     Social History     Social History   • Marital status:      Spouse name: N/A   • Number of children: N/A   • Years of education: N/A     Occupational History   • Not on file.     Social History Main Topics   • Smoking status: Former Smoker     Packs/day: 1.50     Years: 40.00     Types: Cigarettes     Quit date: 1/1/1972   • Smokeless tobacco: Former User   • Alcohol use No      Comment: none since 2007   • Drug use: No   • Sexual activity: Not Currently     Other Topics Concern   • Not on file     Social History Narrative   • No narrative on file     Allergies   Allergen Reactions   • Beta Adrenergic Blockers      depression   • Ezetimibe      \"I fell and hit my head but it could've been the other medications that I was taken.\"   • Metoprolol      \"I fell and hit my head but it could've been the other medications that I was taken.\"     • Other Environmental    • Spironolactone      Abrupt renal failure   • Statins [Hmg-Coa-R Inhibitors]      \"I fell and hit my head but it could've been the other medications that I was taken.\"   • Tape Hives and Rash     Plastic tape     Outpatient Encounter Prescriptions as of 6/26/2018   Medication Sig Dispense Refill   • Budesonide-Formoterol Fumarate (SYMBICORT INH) Inhale  by mouth.     • bisoprolol (ZEBETA) 5 MG Tab Take 0.5 Tabs by mouth every day. 45 Tab 3   • traZODone (DESYREL) 100 MG Tab Take 2 Tabs by mouth every bedtime. 60 Tab 11   • warfarin (COUMADIN) 3 MG Tab Take 2 Tabs by mouth every day. 90 Tab 6   • levothyroxine (SYNTHROID) 125 MCG Tab Take 1 Tab by mouth Every morning on an empty stomach. " 30 Tab 8   • buPROPion (WELLBUTRIN XL) 300 MG XL tablet Take 1 Tab by mouth every morning. 30 Tab 11   • tizanidine (ZANAFLEX) 4 MG Tab Take 1 Tab by mouth every 8 hours as needed (nausea and vomiting). 60 Tab 11   • ondansetron (ZOFRAN) 4 MG Tab tablet Take 1 Tab by mouth every 8 hours as needed for Nausea/Vomiting. 60 Tab 11   • lisinopril (PRINIVIL) 5 MG Tab Take 1 Tab by mouth 2 times a day. 60 Tab 11   • furosemide (LASIX) 40 MG Tab Take 1 Tab by mouth every day. 60 Tab 6   • esomeprazole (NEXIUM) 40 MG delayed-release capsule Take 1 Cap by mouth every morning with breakfast. 30 Cap 6   • tolterodine ER (DETROL LA) 4 MG CAPSULE SR 24 HR Take 1 Cap by mouth every day. 30 Cap 6   • Simethicone 125 MG Cap Spray 125 mg in mouth/throat as needed (gas).     • chlorpheniramine (CHLOR-TRIMETRON) 4 MG Tab Take 4 mg by mouth every 6 hours as needed for Allergies.     • mupirocin (BACTROBAN) 2 % Ointment Apply 1 Inch to affected area(s) every day. 1 Tube 11   • morphine ER (MS CONTIN) 15 MG Tab CR tablet Take 15 mg by mouth every 12 hours.     • oxycodone-acetaminophen (PERCOCET)  MG Tab Take 1 Tab by mouth every 6 hours as needed. 30 Tab 0   • docusate sodium (COLACE) 100 MG CAPS Take 200 mg by mouth 2 times a day.     • sennosides (SENOKOT) 8.6 MG TABS Take 8.6 mg by mouth every day. Indications: Constipation, **OTC**     • ammonium lactate (LAC-HYDRIN) 12 % Lotion APPLY TO THE AFFECTED AREA TWICE DAILY 226 g 1   • albuterol (PROAIR HFA) 108 (90 BASE) MCG/ACT Aero Soln inhalation aerosol Inhale 2 Puffs by mouth every 6 hours as needed for Shortness of Breath. 8.5 g 6     No facility-administered encounter medications on file as of 6/26/2018.      Review of Systems   Constitutional: Positive for malaise/fatigue. Negative for chills and fever.   Respiratory: Positive for shortness of breath. Negative for cough.    Cardiovascular: Positive for chest pain and leg swelling. Negative for palpitations, orthopnea and PND.  "  Gastrointestinal: Negative for abdominal pain and nausea.   Musculoskeletal: Negative for myalgias.   Neurological: Negative for dizziness, loss of consciousness and headaches.   Endo/Heme/Allergies: Does not bruise/bleed easily.        Objective:   /70   Pulse 72   Ht 1.753 m (5' 9\")   Wt 67.1 kg (148 lb)   SpO2 100%   BMI 21.86 kg/m²      Physical Exam   Constitutional: She is oriented to person, place, and time. She appears well-developed and well-nourished.   HENT:   Head: Normocephalic.   Eyes: EOM are normal.   Neck: Normal range of motion. Neck supple. No JVD present.   Cardiovascular: Normal rate and regular rhythm.    Murmur heard.   Systolic murmur is present with a grade of 2/6   Murmur at RUSB.   Pulmonary/Chest: Effort normal and breath sounds normal. No respiratory distress. She has no wheezes. She has no rales.   Abdominal: Soft. Bowel sounds are normal. She exhibits no distension. There is no tenderness.   Musculoskeletal: Normal range of motion. She exhibits edema.   1+ edema to the shins bilaterally.   Neurological: She is alert and oriented to person, place, and time.   Skin: Skin is warm and dry. No rash noted. There is pallor.   Psychiatric: She has a normal mood and affect.     CONCLUSIONS OF ECHOCARDIOGRAM OF 12/7/2017 - REVIEWED WITH PATIENT:  PRior echo 08/24/16Normal left ventricular chamber size.  Mild concentric left ventricular hypertrophy.  Left ventricular ejection fraction is visually estimated to be 45%.  Grade I diastolic dysfunction.  Aortic sclerosis without stenosis.  Trace aortic insufficiency.  Mitral annular calcification.  Trace mitral regurgitation.  Mild tricuspid regurgitation.  Normal pericardium without effusion.    EKG done today and reviewed by me shows sinus rhythm with LBBB (known).    Assessment:     1. Other chest pain  [facility] EP[facility]ANY EKG (Clinic Performed)    ECHOCARDIOGRAM COMP W/O CONT   2. Other restrictive cardiomyopathy (HCC)  ECHOCARDIOGRAM COMP " W/O CONT    BTYPE NATRIURETIC PEPTIDE   3. LBBB (left bundle branch block)     4. Mild intermittent asthma without complication     5. History of pulmonary embolism     6. S/P IVC filter     7. Chronic anticoagulation     8. CKD (chronic kidney disease) stage 3, GFR 30-59 ml/min  BASIC METABOLIC PANEL    CBC WITH DIFFERENTIAL   9. Mixed hyperlipidemia     10. Chronic left shoulder pain  DX-SHOULDER 2+ LEFT       Medical Decision Making:  Today's Assessment / Status / Plan:     1. Chest pain, with history of restrictive cardiomyopathy. To check echo today. Unable to tolerate beta blockers due to depression; remains on ACEI.    2. LBBB, known/stable.    3. History of asthma, with shortness of breath.    4. History of PE, with IVC filter and on Coumadin.    5. Chronic kidney disease, to check CBC and BMP.    6. Hyperlipidemia, not currently on any therapy.    7. Left shoulder pain, to check left shoulder xray.    Plan as above: echo, shoulder xray and labs. We will make changes based on these results.    Collaborating MD: Williams Abrams

## 2018-06-26 NOTE — PROGRESS NOTES
Chief Complaint   Patient presents with   • Follow-Up   • Cardiomyopathy (Non-ischemic)   • COPD   • HTN (Controlled)   • Anticoagulation       Subjective:   Dian Mendoza is a 72 y.o. female who presents today for four month follow-up of non-ischemic cardiomyopathy, COPD, hypertension, hyperlipidemia, and history of previous PE with protein C and S deficiency on Coumadin.    Dian is a 72 year old female with history of Protein C & S deficiencies and history of DVT, anticoagulated with Coumadin, non-ischemic cardiomyopathy with LVEF 45%, pulmonary hypertension, LBBB, hypertension, hyperlipidemia and arthritis with severe back pain.     At last follow-up, low dose Bisoprolol was added, but this made her depression worse. She has since stopped it. She does not feel well today; she feels very short of breath and tired; she also complains of some left to right sided chest pain. She is also having dizziness, but no syncope. She also fell and is having left shoulder pain. She cannot lie flat; mild, stable LE edema.    Past Medical History:   Diagnosis Date   • Anemia    • Atrial tachycardia, paroxysmal (HCC)     occasional arrythmia, has been evaluated by Dr. Reddy   • Cancer of left breast (HCC) 1997    Left breast   • Cardiomyopathy (HCC) 1/6/2015    Idiopathic with normal coronary arteries on cath 1/6/2015    • Cholelithiasis 2014    With gallbladder wall thickening, now status post cholecystectomy   • COPD (chronic obstructive pulmonary disease) (HCC)    • E. coli sepsis (HCC)     Right buttock pressure wound   • Full dentures    • Gastritis    • Hypertension     • Hypokalemia     uncertain cause maintained with potassium replacement.   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   • Pulmonary hypertension (HCC)    • Renal atrophy, right    • Rheumatoid arthritis(714.0)     historical diagnosis   • S/P insertion of IVC (inferior vena caval) filter     Prior to 2000, records not available, not retrievable.   • Seizure (HCC)     As a child   • Sleep apnea     Witnessed in hospital needs work up   • Systolic and diastolic CHF, chronic (HCC)    • TMJ (temporomandibular joint syndrome)      Past Surgical History:   Procedure Laterality Date   • VAMSHI BY LAPAROSCOPY  8/27/2014    Performed by Ajith Kearney M.D. at SUR[facility]   • GASTROSCOPY WITH BIOPSY  12/28/2011    Performed by RAD CANTU at [facility]   • EGD W/ENDOSCOPIC ULTRASOUND  12/28/2011    Performed by RAD CANTU at [facility]   • ERCP  12/28/2011    Performed by RAD CANTU at [facility]   • RECOVERY  12/8/2010    Performed by TERRELL MCKENNA at [facility]   • KNEE REPLACEMENT, TOTAL  2009    left   • ULCER DEBRIDEMENT  6/5/08    Performed by ARIELLE MITCHELL at [facility]   • FLAP CLOSURE  6/5/08    Performed by ARIELLE MITCHELL at [facility]   • LUMBAR FUSION POSTERIOR  1998   • ABDOMINAL HYSTERECTOMY TOTAL  1970's   • ELBOW ORIF  1950    right   • BURSA EXCISION      bursa sac removed bilat hips   • CATARACT EXTRACTION WITH IOL Bilateral    • COLONOSCOPY  2001, 6/2010    normal   • KNEE ARTHROSCOPY      left   • MASTECTOMY      left   • OTHER ORTHOPEDIC SURGERY      BILAT FOOT   • TONSILLECTOMY       Family History   Problem Relation Age of Onset   • Heart Disease Sister    • Heart Disease Mother    • Heart Disease Father    • Cancer Father      esophageal   • Non-contributory Brother      copd, cirrhosis, alzheimer's mild   • Heart Disease Brother      3 MIs  "  • GI Sister      colon polyps, precancerous   • Cancer Sister      breast X 2     Social History     Social History   • Marital status:      Spouse name: N/A   • Number of children: N/A   • Years of education: N/A     Occupational History   • Not on file.     Social History Main Topics   • Smoking status: Former Smoker     Packs/day: 1.50     Years: 40.00     Types: Cigarettes     Quit date: 1/1/1972   • Smokeless tobacco: Former User   • Alcohol use No      Comment: none since 2007   • Drug use: No   • Sexual activity: Not Currently     Other Topics Concern   • Not on file     Social History Narrative   • No narrative on file     Allergies   Allergen Reactions   • Beta Adrenergic Blockers      depression   • Ezetimibe      \"I fell and hit my head but it could've been the other medications that I was taken.\"   • Metoprolol      \"I fell and hit my head but it could've been the other medications that I was taken.\"     • Other Environmental    • Spironolactone      Abrupt renal failure   • Statins [Hmg-Coa-R Inhibitors]      \"I fell and hit my head but it could've been the other medications that I was taken.\"   • Tape Hives and Rash     Plastic tape     Outpatient Encounter Prescriptions as of 6/26/2018   Medication Sig Dispense Refill   • Budesonide-Formoterol Fumarate (SYMBICORT INH) Inhale  by mouth.     • bisoprolol (ZEBETA) 5 MG Tab Take 0.5 Tabs by mouth every day. 45 Tab 3   • traZODone (DESYREL) 100 MG Tab Take 2 Tabs by mouth every bedtime. 60 Tab 11   • warfarin (COUMADIN) 3 MG Tab Take 2 Tabs by mouth every day. 90 Tab 6   • levothyroxine (SYNTHROID) 125 MCG Tab Take 1 Tab by mouth Every morning on an empty stomach. 30 Tab 8   • buPROPion (WELLBUTRIN XL) 300 MG XL tablet Take 1 Tab by mouth every morning. 30 Tab 11   • tizanidine (ZANAFLEX) 4 MG Tab Take 1 Tab by mouth every 8 hours as needed (nausea and vomiting). 60 Tab 11   • ondansetron (ZOFRAN) 4 MG Tab tablet Take 1 Tab by mouth every 8 hours " as needed for Nausea/Vomiting. 60 Tab 11   • lisinopril (PRINIVIL) 5 MG Tab Take 1 Tab by mouth 2 times a day. 60 Tab 11   • furosemide (LASIX) 40 MG Tab Take 1 Tab by mouth every day. 60 Tab 6   • esomeprazole (NEXIUM) 40 MG delayed-release capsule Take 1 Cap by mouth every morning with breakfast. 30 Cap 6   • tolterodine ER (DETROL LA) 4 MG CAPSULE SR 24 HR Take 1 Cap by mouth every day. 30 Cap 6   • Simethicone 125 MG Cap Spray 125 mg in mouth/throat as needed (gas).     • chlorpheniramine (CHLOR-TRIMETRON) 4 MG Tab Take 4 mg by mouth every 6 hours as needed for Allergies.     • mupirocin (BACTROBAN) 2 % Ointment Apply 1 Inch to affected area(s) every day. 1 Tube 11   • morphine ER (MS CONTIN) 15 MG Tab CR tablet Take 15 mg by mouth every 12 hours.     • oxycodone-acetaminophen (PERCOCET)  MG Tab Take 1 Tab by mouth every 6 hours as needed. 30 Tab 0   • docusate sodium (COLACE) 100 MG CAPS Take 200 mg by mouth 2 times a day.     • sennosides (SENOKOT) 8.6 MG TABS Take 8.6 mg by mouth every day. Indications: Constipation, **OTC**     • ammonium lactate (LAC-HYDRIN) 12 % Lotion APPLY TO THE AFFECTED AREA TWICE DAILY 226 g 1   • albuterol (PROAIR HFA) 108 (90 BASE) MCG/ACT Aero Soln inhalation aerosol Inhale 2 Puffs by mouth every 6 hours as needed for Shortness of Breath. 8.5 g 6     No facility-administered encounter medications on file as of 6/26/2018.      Review of Systems   Constitutional: Positive for malaise/fatigue. Negative for chills and fever.   Respiratory: Positive for shortness of breath. Negative for cough.    Cardiovascular: Positive for chest pain and leg swelling. Negative for palpitations, orthopnea and PND.   Gastrointestinal: Negative for abdominal pain and nausea.   Musculoskeletal: Negative for myalgias.   Neurological: Negative for dizziness, loss of consciousness and headaches.   Endo/Heme/Allergies: Does not bruise/bleed easily.        Objective:   /70   Pulse 72   Ht 1.753 m  "(5' 9\")   Wt 67.1 kg (148 lb)   SpO2 100%   BMI 21.86 kg/m²     Physical Exam   Constitutional: She is oriented to person, place, and time. She appears well-developed and well-nourished.   HENT:   Head: Normocephalic.   Eyes: EOM are normal.   Neck: Normal range of motion. Neck supple. No JVD present.   Cardiovascular: Normal rate and regular rhythm.    Murmur heard.   Systolic murmur is present with a grade of 2/6   Murmur at RUSB.   Pulmonary/Chest: Effort normal and breath sounds normal. No respiratory distress. She has no wheezes. She has no rales.   Abdominal: Soft. Bowel sounds are normal. She exhibits no distension. There is no tenderness.   Musculoskeletal: Normal range of motion. She exhibits edema.   1+ edema to the shins bilaterally.   Neurological: She is alert and oriented to person, place, and time.   Skin: Skin is warm and dry. No rash noted. There is pallor.   Psychiatric: She has a normal mood and affect.     CONCLUSIONS OF ECHOCARDIOGRAM OF 12/7/2017 - REVIEWED WITH PATIENT:  PRior echo 08/24/16Normal left ventricular chamber size.  Mild concentric left ventricular hypertrophy.  Left ventricular ejection fraction is visually estimated to be 45%.  Grade I diastolic dysfunction.  Aortic sclerosis without stenosis.  Trace aortic insufficiency.  Mitral annular calcification.  Trace mitral regurgitation.  Mild tricuspid regurgitation.  Normal pericardium without effusion.    EKG done today and reviewed by me shows sinus rhythm with LBBB (known).    Assessment:     1. Other chest pain  [facility] EP[facility]ANY EKG (Clinic Performed)    ECHOCARDIOGRAM COMP W/O CONT   2. Other restrictive cardiomyopathy (HCC)  ECHOCARDIOGRAM COMP W/O CONT    BTYPE NATRIURETIC PEPTIDE   3. LBBB (left bundle branch block)     4. Mild intermittent asthma without complication     5. History of pulmonary embolism     6. S/P IVC filter     7. Chronic anticoagulation     8. CKD (chronic kidney disease) stage 3, GFR 30-59 ml/min  BASIC " METABOLIC PANEL    CBC WITH DIFFERENTIAL   9. Mixed hyperlipidemia     10. Chronic left shoulder pain  DX-SHOULDER 2+ LEFT       Medical Decision Making:  Today's Assessment / Status / Plan:     1. Chest pain, with history of restrictive cardiomyopathy. To check echo today. Unable to tolerate beta blockers due to depression; remains on ACEI.    2. LBBB, known/stable.    3. History of asthma, with shortness of breath.    4. History of PE, with IVC filter and on Coumadin.    5. Chronic kidney disease, to check CBC and BMP.    6. Hyperlipidemia, not currently on any therapy.    7. Left shoulder pain, to check left shoulder xray.    Plan as above: echo, shoulder xray and labs. We will make changes based on these results.    Collaborating MD: Williams

## 2018-06-29 ENCOUNTER — TELEPHONE (OUTPATIENT)
Dept: CARDIOLOGY | Facility: MEDICAL CENTER | Age: 73
End: 2018-06-29

## 2018-06-29 NOTE — TELEPHONE ENCOUNTER
----- Message from Raquel Murdock L.P.N. sent at 6/29/2018  9:19 AM PDT -----      ----- Message -----  From: BRITTA Vaughan  Sent: 6/28/2018  10:02 AM  To: Brandi Evangelista R.N.    Have her FU in 6 weeks, just so we can make sure she is doing OK - she had a lot of symptoms at last FU visit.  Thanks, AB

## 2018-06-29 NOTE — TELEPHONE ENCOUNTER
Called pt. To advise. She is scheduled to see Brenda 8-7-18.    ECHOCARDIOGRAM COMP W/O CONT   Order: [id number]   Status:  Final result   Visible to patient:  No (Not Released) Dx:  Other chest pain; Other restrictive c...   Notes recorded by BRITTA Vaughan on 6/28/2018 at 10:02 AM PDT  Have her FU in 6 weeks, just so we can make sure she is doing OK - she had a lot of symptoms at last FU visit.  Thanks, AB  ------    Notes recorded by Brandi Evangelista R.N. on 6/28/2018 at 9:22 AM PDT  Patient was informed.  She would like to know when Brenda would like to have a return visit.  ------    Notes recorded by BRITTA Vaughan on 6/27/2018 at 1:17 PM PDT  Labs are also stable, as is her shoulder xray - no fracture, just arthritis.  Thanks, AB  ------    Notes recorded by BRITTA Vaughan on 6/27/2018 at 1:16 PM PDT  Please let pt know that echo looks good - normal LV size and function, mild TR. Very good!  Same meds. Awaiting xray results and labs.  Thanks, aB

## 2018-07-05 DIAGNOSIS — R21 EXCORIATED RASH: ICD-10-CM

## 2018-07-05 RX ORDER — AMMONIUM LACTATE 12 G/100G
LOTION TOPICAL
Qty: 226 G | Refills: 1 | Status: SHIPPED | OUTPATIENT
Start: 2018-07-05 | End: 2018-09-21 | Stop reason: SDUPTHER

## 2018-07-10 ENCOUNTER — TELEPHONE (OUTPATIENT)
Dept: MEDICAL GROUP | Facility: CLINIC | Age: 73
End: 2018-07-10

## 2018-07-10 NOTE — TELEPHONE ENCOUNTER
1. Caller Name: Dian Mendoza                                           Call Back Number: [phone] (home)         Patient approves a detailed voicemail message: yes    R&R levels were at 3.3 and wants to know if the coumadin needs to be adjusted.

## 2018-07-11 NOTE — TELEPHONE ENCOUNTER
So I am assuming that means 2 of the 3 mg tablets?  If that so she should take 2 tablets 6 days a week, only 1 tablet on the seventh day and then recheck the lab in 1 month.

## 2018-07-17 ENCOUNTER — TELEPHONE (OUTPATIENT)
Dept: MEDICAL GROUP | Facility: CLINIC | Age: 73
End: 2018-07-17

## 2018-07-17 NOTE — TELEPHONE ENCOUNTER
1. Caller Name: Dian Mendoza                                           Call Back Number: [phone] (home)         Patient approves a detailed voicemail message: yes    Patient is calling in she has sores on her arms and legs that are growing in size as well as spreading.

## 2018-07-25 ENCOUNTER — OFFICE VISIT (OUTPATIENT)
Dept: MEDICAL GROUP | Facility: CLINIC | Age: 73
End: 2018-07-25
Payer: MEDICARE

## 2018-07-25 VITALS
DIASTOLIC BLOOD PRESSURE: 68 MMHG | HEIGHT: 69 IN | BODY MASS INDEX: 22.66 KG/M2 | WEIGHT: 153 LBS | OXYGEN SATURATION: 92 % | HEART RATE: 75 BPM | SYSTOLIC BLOOD PRESSURE: 124 MMHG | TEMPERATURE: 99.5 F | RESPIRATION RATE: 13 BRPM

## 2018-07-25 DIAGNOSIS — K27.9 PUD (PEPTIC ULCER DISEASE): ICD-10-CM

## 2018-07-25 DIAGNOSIS — I42.5 OTHER RESTRICTIVE CARDIOMYOPATHY (HCC): ICD-10-CM

## 2018-07-25 DIAGNOSIS — M05.79 RHEUMATOID ARTHRITIS INVOLVING MULTIPLE SITES WITH POSITIVE RHEUMATOID FACTOR (HCC): ICD-10-CM

## 2018-07-25 DIAGNOSIS — R23.8 EXCORIATED PAPULE: ICD-10-CM

## 2018-07-25 DIAGNOSIS — I70.213 ATHEROSCLEROSIS OF NATIVE ARTERY OF BOTH LOWER EXTREMITIES WITH INTERMITTENT CLAUDICATION (HCC): ICD-10-CM

## 2018-07-25 DIAGNOSIS — J43.9 PULMONARY EMPHYSEMA, UNSPECIFIED EMPHYSEMA TYPE (HCC): ICD-10-CM

## 2018-07-25 DIAGNOSIS — N39.46 MIXED URGE AND STRESS INCONTINENCE: ICD-10-CM

## 2018-07-25 DIAGNOSIS — I82.220 INFERIOR VENA CAVAL THROMBOSIS (HCC): ICD-10-CM

## 2018-07-25 PROCEDURE — 99213 OFFICE O/P EST LOW 20 MIN: CPT | Performed by: FAMILY MEDICINE

## 2018-07-25 RX ORDER — TOLTERODINE 4 MG/1
4 CAPSULE, EXTENDED RELEASE ORAL DAILY
Qty: 30 CAP | Refills: 6 | Status: SHIPPED | OUTPATIENT
Start: 2018-07-25 | End: 2019-07-09

## 2018-07-25 RX ORDER — ESOMEPRAZOLE MAGNESIUM 40 MG/1
40 CAPSULE, DELAYED RELEASE ORAL
Qty: 30 CAP | Refills: 6 | Status: SHIPPED | OUTPATIENT
Start: 2018-07-25 | End: 2019-07-09

## 2018-07-25 NOTE — PROGRESS NOTES
Chief Complaint   Patient presents with   • Sore   • Rash   • Peptic Ulcer Disease       Subjective:     HPI:   Dian Mendoza presents today with the followin. Excoriated papule  She is having a recurrence of multiple excoriated papules.  This happens when she is very stressed.  She and her son have been having quite a few arguments.  She sleeps on the couch and he sleeps in the bed as he cannot sleep on the couch.  They are trying to improve the living situation but it has been very challenging.  Examination of the skin shows deeply excoriated papules some of which seem to have some secondary infection.  I believe she typically gets an itchy papular rash which worsens with stress and anxiety.  She then excoriates these probably in her sleep.  She states she does not scratch during the day but does occasionally wake up finding herself scratching at her skin.  She has been using the Lac-Hydrin lotion which she feels has been very helpful.  She continues this but some of these sores are not healing.  There does appear to be secondary infection Bactroban ointment is discussed for use twice daily.    2. PUD (peptic ulcer disease)  Patient has a history of peptic ulcers.  She did have bleeding ulcers at one time.  For this reason she takes Nexium daily.  She needs a renewal of the medication.  Kidney function has been stable.  She feels the current medication regimen of Nexium daily is controlling the gastroesophageal reflux symptoms and abdominal pain well. Denies dysphagia, reflux symptoms, acidity, abdominal pain or visible blood or mucus in the stool. Denies vomiting or hematemesis. Denies burping or abdominal bloating. Patient avoids nonsteroidal anti-inflammatory drugs. Avoids heavy meals or eating within 2 hours of bedtime.    3. Mixed urge and stress incontinence  Patient has long-standing mixed urge and stress incontinence which is primarily from pelvic floor dysfunction.  States the generic Detrol LA  continues to be very helpful.  This medication is also renewed.  Patient had recent testing from her cardiology provider.  Denies dysuria or visible blood in the urine.    4. Rheumatoid arthritis involving multiple sites with positive rheumatoid factor (HCC)  This was quite active many years ago.  However, approximately 10 years ago now her evaluation showed that the rheumatoid had pretty much burned out per patient.  Westergren sed rate last fall was it well in the normal range.  CPK has been high in the past but only with statins.  This is felt to be clinically quiet.    5. Atherosclerosis of native artery of both lower extremities with intermittent claudication (HCC)  Patient does have peripheral vascular disease and has intermittent claudication.  She is not currently symptomatic and has no ulcerations.  She follows on this problem with cardiology at this point.    6. Inferior vena caval thrombosis (HCC)  She has a history of multiple blood clots and continues on warfarin regularly.  She is checking her pro time on a regular basis.  This is long-standing and permanent problem.    7. Other restrictive cardiomyopathy (HCC)  Continues to follow with cardiology, has been stable.    8. Pulmonary emphysema, unspecified emphysema type (HCC)  She remains quit with her smoking.  Her SOB is multifactorial, anemia and underlying lung disease.        Patient Active Problem List    Diagnosis Date Noted   • Mixed hyperlipidemia 10/12/2015     Priority: High   • Chronic anticoagulation 10/12/2015     Priority: High   • LBBB (left bundle branch block) 10/12/2015     Priority: High   • CKD (chronic kidney disease) stage 3, GFR 30-59 ml/min 08/23/2014     Priority: High   • History of deep vein thrombosis 08/24/2012     Priority: High   • S/P IVC filter 10/08/2011     Priority: High   • PUD (peptic ulcer disease) 10/08/2011     Priority: High   • Muscle spasticity 12/07/2010     Priority: High   • Other restrictive cardiomyopathy  ([facility]) 01/06/2015     Priority: Medium   • History of pulmonary embolism 04/12/2013     Priority: Medium   • Atherosclerosis of native arteries of extremity with intermittent claudication ([facility]) 12/19/2011     Priority: Medium   • Inferior vena caval thrombosis ([facility]) 02/01/2011     Priority: Medium   • Rheumatoid arthritis involving multiple sites with positive rheumatoid factor ([facility]) 12/07/2010     Priority: Medium   • Pain management 11/18/2016     Priority: Low   • Pulmonary hypertension ([facility]) 01/31/2014     Priority: Low   • Anxiety 10/08/2011     Priority: Low   • Lumbar disc herniation with radiculopathy      Priority: Low   • Idiopathic atrophic hypothyroidism      Priority: Low   • Protein C deficiency ([facility]) 07/16/2009     Priority: Low   • Protein S deficiency ([facility]) 07/16/2009     Priority: Low   • TMJ (temporomandibular joint syndrome) 06/19/2009     Priority: Low   • Left shoulder pain 06/26/2018   • History of repair of right rotator cuff 04/10/2018   • Recurrent major depressive disorder, in partial remission ([facility]) 06/29/2017   • Essential hypertension, benign 06/22/2017   • Emphysema lung ([facility]) 02/11/2017   • Anemia due to multiple mechanisms 09/14/2016   • Other chest pain 08/22/2016   • Vitamin D deficiency disease 02/11/2016   • Mild intermittent asthma 10/19/2015   • Cervical disc disease with myelopathy 03/11/2015   • Muscle weakness of lower extremity 03/11/2015   • MEDICAL HOME 01/12/2015   • GERD (gastroesophageal reflux disease) 09/05/2014   • Mixed urge and stress incontinence    • Chronic pain syndrome 08/21/2014   • Insomnia 03/10/2014   • History of decubitus ulcer 08/24/2012       Current medicines (including changes today)  Current Outpatient Prescriptions   Medication Sig Dispense Refill   • mupirocin (BACTROBAN) 2 % Ointment Apply to affected area twice daily 1 Tube 3   • esomeprazole (NEXIUM) 40 MG delayed-release capsule Take 1 Cap by mouth every morning with breakfast. 30 Cap 6   •  tolterodine ER (DETROL LA) 4 MG CAPSULE SR 24 HR Take 1 Cap by mouth every day. 30 Cap 6   • ammonium lactate (LAC-HYDRIN) 12 % Lotion APPLY TO THE AFFECTED AREA TWICE DAILY 226 g 1   • Budesonide-Formoterol Fumarate (SYMBICORT INH) Inhale  by mouth.     • bisoprolol (ZEBETA) 5 MG Tab Take 0.5 Tabs by mouth every day. 45 Tab 3   • traZODone (DESYREL) 100 MG Tab Take 2 Tabs by mouth every bedtime. 60 Tab 11   • warfarin (COUMADIN) 3 MG Tab Take 2 Tabs by mouth every day. 90 Tab 6   • levothyroxine (SYNTHROID) 125 MCG Tab Take 1 Tab by mouth Every morning on an empty stomach. 30 Tab 8   • buPROPion (WELLBUTRIN XL) 300 MG XL tablet Take 1 Tab by mouth every morning. 30 Tab 11   • tizanidine (ZANAFLEX) 4 MG Tab Take 1 Tab by mouth every 8 hours as needed (nausea and vomiting). 60 Tab 11   • ondansetron (ZOFRAN) 4 MG Tab tablet Take 1 Tab by mouth every 8 hours as needed for Nausea/Vomiting. 60 Tab 11   • lisinopril (PRINIVIL) 5 MG Tab Take 1 Tab by mouth 2 times a day. 60 Tab 11   • furosemide (LASIX) 40 MG Tab Take 1 Tab by mouth every day. 60 Tab 6   • albuterol (PROAIR HFA) 108 (90 BASE) MCG/ACT Aero Soln inhalation aerosol Inhale 2 Puffs by mouth every 6 hours as needed for Shortness of Breath. 8.5 g 6   • Simethicone 125 MG Cap Spray 125 mg in mouth/throat as needed (gas).     • chlorpheniramine (CHLOR-TRIMETRON) 4 MG Tab Take 4 mg by mouth every 6 hours as needed for Allergies.     • mupirocin (BACTROBAN) 2 % Ointment Apply 1 Inch to affected area(s) every day. 1 Tube 11   • morphine ER (MS CONTIN) 15 MG Tab CR tablet Take 15 mg by mouth every 12 hours.     • oxycodone-acetaminophen (PERCOCET)  MG Tab Take 1 Tab by mouth every 6 hours as needed. 30 Tab 0   • docusate sodium (COLACE) 100 MG CAPS Take 200 mg by mouth 2 times a day.     • sennosides (SENOKOT) 8.6 MG TABS Take 8.6 mg by mouth every day. Indications: Constipation, **OTC**       No current facility-administered medications for this visit.   "      Allergies   Allergen Reactions   • Beta Adrenergic Blockers      depression   • Ezetimibe      \"I fell and hit my head but it could've been the other medications that I was taken.\"   • Metoprolol      \"I fell and hit my head but it could've been the other medications that I was taken.\"     • Other Environmental    • Spironolactone      Abrupt renal failure   • Statins [Hmg-Coa-R Inhibitors]      \"I fell and hit my head but it could've been the other medications that I was taken.\"   • Tape Hives and Rash     Plastic tape       ROS: As per HPI       Objective:     Blood pressure 124/68, pulse 75, temperature 37.5 °C (99.5 °F), resp. rate 13, height 1.753 m (5' 9\"), weight 69.4 kg (153 lb), SpO2 92 %. Body mass index is 22.59 kg/m².    Physical Exam:  Constitutional: Well-developed and well-nourished. Not diaphoretic. No distress. Lucid and fluent.  Skin: Skin is warm and dry. Excoriated rash shoulders, arms, chest and legs.  Head: Atraumatic without lesions.  Eyes: Conjunctivae and extraocular motions are normal. Pupils are equal, round, and reactive to light. No scleral icterus.   Ears:  External ears unremarkable. Tympanic membranes clear and intact.  Nose: Nares patent. Mucosa without edema or erythema. No discharge. No facial tenderness.  Mouth/Throat: Tongue normal. Oropharynx is clear and moist. Posterior pharynx without erythema or exudates.  Neck: Supple, trachea midline. No thyromegaly present. No cervical or supraclavicular lymphadenopathy. No JVD or carotid bruits appreciated  Cardiovascular: Regular rate and rhythm.  Normal S1, S2 without murmur appreciated.  Chest: Effort normal. Clear to auscultation throughout. No adventitious sounds.   Extremities: No cyanosis, clubbing, erythema, nor edema.   Neurological: Alert and oriented x 3. No tremor.  Using a walker.    Psychiatric:  Behavior, mood, and affect are appropriate.       Assessment and Plan:     73 y.o. female with the following issues:    1. " Excoriated papule  mupirocin (BACTROBAN) 2 % Ointment    DISCONTINUED: mupirocin (BACTROBAN) 2 % Ointment   2. PUD (peptic ulcer disease)  esomeprazole (NEXIUM) 40 MG delayed-release capsule   3. Mixed urge and stress incontinence  tolterodine ER (DETROL LA) 4 MG CAPSULE SR 24 HR   4. Rheumatoid arthritis involving multiple sites with positive rheumatoid factor (HCC)     5. Atherosclerosis of native artery of both lower extremities with intermittent claudication (HCC)     6. Inferior vena caval thrombosis (HCC)     7. Other restrictive cardiomyopathy (HCC)     8. Pulmonary emphysema, unspecified emphysema type (HCC)           Followup: Return in about 6 months (around 1/25/2019), or if symptoms worsen or fail to improve.

## 2018-07-30 DIAGNOSIS — J45.20 MILD INTERMITTENT ASTHMA, UNCOMPLICATED: ICD-10-CM

## 2018-07-30 RX ORDER — ALBUTEROL SULFATE 90 UG/1
2 AEROSOL, METERED RESPIRATORY (INHALATION) EVERY 6 HOURS PRN
Qty: 8.5 G | Refills: 6 | Status: SHIPPED | OUTPATIENT
Start: 2018-07-30

## 2018-08-16 ENCOUNTER — TELEPHONE (OUTPATIENT)
Dept: MEDICAL GROUP | Facility: MEDICAL CENTER | Age: 73
End: 2018-08-16

## 2018-08-16 DIAGNOSIS — R21 PAPULAR RASH, GENERALIZED: ICD-10-CM

## 2018-08-16 DIAGNOSIS — R23.8 EXCORIATED PAPULE: ICD-10-CM

## 2018-08-16 RX ORDER — CEPHALEXIN 500 MG/1
500 CAPSULE ORAL 3 TIMES DAILY
Qty: 15 CAP | Refills: 0 | Status: SHIPPED | OUTPATIENT
Start: 2018-08-16 | End: 2018-08-20

## 2018-08-16 NOTE — TELEPHONE ENCOUNTER
Margi Mora is a 79year old male. HPI:   PT is here to review his recent + nasal swab for mRSA and where to go from here. The test was required by his specialist prior to colonoscopy. He has a remote hx of MRSA infection.   He does not have an active VOICEMAIL  1. Caller Name: Dian Mendoza                      Call Back Number: [phone] (home)     2. Message: pt called stating her medication for the ointment is not working please advise. Thank you     3. Patient approves office to leave a detailed voicemail/MyChart message: yes       HISTORY:  Past Medical History:   Diagnosis Date   • Abdominal mass, right lower quadrant     kidney/abd area- colonoscopy scheduled 04/01/16   • Anesthesia complication     per wife, gets upset/mad after anesthesia sometimes   • Aortic stenosis date: UPPER GI ENDOSCOPY,EXAM   Family History   Problem Relation Age of Onset   • Heart Disorder Father 62     MI   • Hypertension Father    • Heart Disorder Brother 67     MI   • Diabetes Neg       Smoking status: Former Smoker

## 2018-08-20 NOTE — TELEPHONE ENCOUNTER
Pt called stating the mupirocin (BACTROBAN) 2 % Ointment needs to be change to more then 1 tube this is just enough for 2 applications can you put in there to last for 10 days, this stuff has alive critters there is not just bumps. Please help.

## 2018-08-21 NOTE — TELEPHONE ENCOUNTER
She needs to continue using the Lac-Hydrin lotion as well.  She needs to put the Bactroban only on the active lesions.

## 2018-09-21 DIAGNOSIS — R21 EXCORIATED RASH: ICD-10-CM

## 2018-09-21 RX ORDER — AMMONIUM LACTATE 12 G/100G
LOTION TOPICAL
Qty: 226 G | Refills: 1 | Status: SHIPPED | OUTPATIENT
Start: 2018-09-21 | End: 2019-07-09

## 2018-10-04 ENCOUNTER — APPOINTMENT (OUTPATIENT)
Dept: RADIOLOGY | Facility: MEDICAL CENTER | Age: 73
DRG: 071 | End: 2018-10-04
Attending: EMERGENCY MEDICINE
Payer: MEDICARE

## 2018-10-04 ENCOUNTER — HOSPITAL ENCOUNTER (INPATIENT)
Facility: MEDICAL CENTER | Age: 73
LOS: 4 days | DRG: 071 | End: 2018-10-09
Attending: EMERGENCY MEDICINE | Admitting: HOSPITALIST
Payer: MEDICARE

## 2018-10-04 DIAGNOSIS — M05.79 RHEUMATOID ARTHRITIS INVOLVING MULTIPLE SITES WITH POSITIVE RHEUMATOID FACTOR (HCC): ICD-10-CM

## 2018-10-04 DIAGNOSIS — R79.89 ELEVATED TROPONIN: ICD-10-CM

## 2018-10-04 DIAGNOSIS — R79.1 SUPRATHERAPEUTIC INR: ICD-10-CM

## 2018-10-04 DIAGNOSIS — N17.9 ACUTE RENAL FAILURE, UNSPECIFIED ACUTE RENAL FAILURE TYPE (HCC): ICD-10-CM

## 2018-10-04 DIAGNOSIS — F11.288: ICD-10-CM

## 2018-10-04 DIAGNOSIS — Z45.2 EXHAUSTED VASCULAR ACCESS: ICD-10-CM

## 2018-10-04 DIAGNOSIS — R41.82 ALTERED MENTAL STATUS, UNSPECIFIED ALTERED MENTAL STATUS TYPE: ICD-10-CM

## 2018-10-04 DIAGNOSIS — E87.1 HYPONATREMIA: ICD-10-CM

## 2018-10-04 DIAGNOSIS — D68.59 PROTEIN S DEFICIENCY (HCC): ICD-10-CM

## 2018-10-04 DIAGNOSIS — D63.8 ANEMIA, CHRONIC DISEASE: ICD-10-CM

## 2018-10-04 DIAGNOSIS — N18.30 CKD (CHRONIC KIDNEY DISEASE) STAGE 3, GFR 30-59 ML/MIN (HCC): ICD-10-CM

## 2018-10-04 DIAGNOSIS — D68.59 PROTEIN C DEFICIENCY (HCC): ICD-10-CM

## 2018-10-04 DIAGNOSIS — M62.81 MUSCLE WEAKNESS OF LOWER EXTREMITY: ICD-10-CM

## 2018-10-04 LAB — EKG IMPRESSION: NORMAL

## 2018-10-04 PROCEDURE — 84484 ASSAY OF TROPONIN QUANT: CPT

## 2018-10-04 PROCEDURE — 80307 DRUG TEST PRSMV CHEM ANLYZR: CPT

## 2018-10-04 PROCEDURE — 80053 COMPREHEN METABOLIC PANEL: CPT

## 2018-10-04 PROCEDURE — 84443 ASSAY THYROID STIM HORMONE: CPT

## 2018-10-04 PROCEDURE — 83036 HEMOGLOBIN GLYCOSYLATED A1C: CPT

## 2018-10-04 PROCEDURE — 83880 ASSAY OF NATRIURETIC PEPTIDE: CPT

## 2018-10-04 PROCEDURE — 85730 THROMBOPLASTIN TIME PARTIAL: CPT

## 2018-10-04 PROCEDURE — 70450 CT HEAD/BRAIN W/O DYE: CPT

## 2018-10-04 PROCEDURE — 93005 ELECTROCARDIOGRAM TRACING: CPT | Performed by: EMERGENCY MEDICINE

## 2018-10-04 PROCEDURE — 85610 PROTHROMBIN TIME: CPT

## 2018-10-04 PROCEDURE — 81001 URINALYSIS AUTO W/SCOPE: CPT

## 2018-10-04 PROCEDURE — 85025 COMPLETE CBC W/AUTO DIFF WBC: CPT

## 2018-10-04 PROCEDURE — 99285 EMERGENCY DEPT VISIT HI MDM: CPT

## 2018-10-04 PROCEDURE — 84439 ASSAY OF FREE THYROXINE: CPT

## 2018-10-04 ASSESSMENT — PAIN SCALES - GENERAL: PAINLEVEL_OUTOF10: 0

## 2018-10-05 ENCOUNTER — APPOINTMENT (OUTPATIENT)
Dept: RADIOLOGY | Facility: MEDICAL CENTER | Age: 73
DRG: 071 | End: 2018-10-05
Attending: EMERGENCY MEDICINE
Payer: MEDICARE

## 2018-10-05 PROBLEM — G93.40 ACUTE ENCEPHALOPATHY: Status: ACTIVE | Noted: 2018-10-05

## 2018-10-05 PROBLEM — D64.9 NORMOCYTIC ANEMIA: Status: ACTIVE | Noted: 2018-10-05

## 2018-10-05 PROBLEM — N17.9 ACUTE RENAL FAILURE SUPERIMPOSED ON STAGE 3 CHRONIC KIDNEY DISEASE (HCC): Status: ACTIVE | Noted: 2018-10-05

## 2018-10-05 PROBLEM — N18.30 ACUTE RENAL FAILURE SUPERIMPOSED ON STAGE 3 CHRONIC KIDNEY DISEASE (HCC): Status: ACTIVE | Noted: 2018-10-05

## 2018-10-05 LAB
ALBUMIN SERPL BCP-MCNC: 4.2 G/DL (ref 3.2–4.9)
ALBUMIN/GLOB SERPL: 1.3 G/DL
ALP SERPL-CCNC: 63 U/L (ref 30–99)
ALT SERPL-CCNC: 21 U/L (ref 2–50)
AMPHET UR QL SCN: NEGATIVE
ANION GAP SERPL CALC-SCNC: 13 MMOL/L (ref 0–11.9)
ANION GAP SERPL CALC-SCNC: 20 MMOL/L (ref 0–11.9)
APPEARANCE UR: ABNORMAL
APTT PPP: 45.3 SEC (ref 24.7–36)
AST SERPL-CCNC: 31 U/L (ref 12–45)
BACTERIA #/AREA URNS HPF: NEGATIVE /HPF
BARBITURATES UR QL SCN: NEGATIVE
BASOPHILS # BLD AUTO: 0.1 % (ref 0–1.8)
BASOPHILS # BLD: 0.02 K/UL (ref 0–0.12)
BENZODIAZ UR QL SCN: NEGATIVE
BILIRUB SERPL-MCNC: 0.2 MG/DL (ref 0.1–1.5)
BILIRUB UR QL STRIP.AUTO: NEGATIVE
BNP SERPL-MCNC: 830 PG/ML (ref 0–100)
BUN SERPL-MCNC: 63 MG/DL (ref 8–22)
BUN SERPL-MCNC: 73 MG/DL (ref 8–22)
BZE UR QL SCN: NEGATIVE
CALCIUM SERPL-MCNC: 7.6 MG/DL (ref 8.5–10.5)
CALCIUM SERPL-MCNC: 8.6 MG/DL (ref 8.5–10.5)
CANNABINOIDS UR QL SCN: NEGATIVE
CHLORIDE SERPL-SCNC: 108 MMOL/L (ref 96–112)
CHLORIDE SERPL-SCNC: 118 MMOL/L (ref 96–112)
CO2 SERPL-SCNC: 13 MMOL/L (ref 20–33)
CO2 SERPL-SCNC: 14 MMOL/L (ref 20–33)
COLOR UR: ABNORMAL
CREAT SERPL-MCNC: 3.67 MG/DL (ref 0.5–1.4)
CREAT SERPL-MCNC: 4.73 MG/DL (ref 0.5–1.4)
EOSINOPHIL # BLD AUTO: 0 K/UL (ref 0–0.51)
EOSINOPHIL NFR BLD: 0 % (ref 0–6.9)
EPI CELLS #/AREA URNS HPF: NEGATIVE /HPF
ERYTHROCYTE [DISTWIDTH] IN BLOOD BY AUTOMATED COUNT: 51.9 FL (ref 35.9–50)
EST. AVERAGE GLUCOSE BLD GHB EST-MCNC: 111 MG/DL
ETHANOL BLD-MCNC: 0.01 G/DL
FOLATE SERPL-MCNC: >24 NG/ML
GLOBULIN SER CALC-MCNC: 3.3 G/DL (ref 1.9–3.5)
GLUCOSE SERPL-MCNC: 124 MG/DL (ref 65–99)
GLUCOSE SERPL-MCNC: 150 MG/DL (ref 65–99)
GLUCOSE UR STRIP.AUTO-MCNC: NEGATIVE MG/DL
HBA1C MFR BLD: 5.5 % (ref 0–5.6)
HCT VFR BLD AUTO: 36.3 % (ref 37–47)
HGB BLD-MCNC: 11.2 G/DL (ref 12–16)
HIV 1+2 AB+HIV1 P24 AG SERPL QL IA: NON REACTIVE
HYALINE CASTS #/AREA URNS LPF: ABNORMAL /LPF
IMM GRANULOCYTES # BLD AUTO: 0.09 K/UL (ref 0–0.11)
IMM GRANULOCYTES NFR BLD AUTO: 0.5 % (ref 0–0.9)
INR PPP: 8.2 (ref 0.87–1.13)
KETONES UR STRIP.AUTO-MCNC: ABNORMAL MG/DL
LACTATE BLD-SCNC: 1.4 MMOL/L (ref 0.5–2)
LEUKOCYTE ESTERASE UR QL STRIP.AUTO: NEGATIVE
LYMPHOCYTES # BLD AUTO: 0.75 K/UL (ref 1–4.8)
LYMPHOCYTES NFR BLD: 4.4 % (ref 22–41)
MCH RBC QN AUTO: 28.6 PG (ref 27–33)
MCHC RBC AUTO-ENTMCNC: 30.9 G/DL (ref 33.6–35)
MCV RBC AUTO: 92.6 FL (ref 81.4–97.8)
METHADONE UR QL SCN: NEGATIVE
MICRO URNS: ABNORMAL
MONOCYTES # BLD AUTO: 0.98 K/UL (ref 0–0.85)
MONOCYTES NFR BLD AUTO: 5.8 % (ref 0–13.4)
NEUTROPHILS # BLD AUTO: 15.08 K/UL (ref 2–7.15)
NEUTROPHILS NFR BLD: 89.2 % (ref 44–72)
NITRITE UR QL STRIP.AUTO: NEGATIVE
NRBC # BLD AUTO: 0 K/UL
NRBC BLD-RTO: 0 /100 WBC
OPIATES UR QL SCN: POSITIVE
OXYCODONE UR QL SCN: POSITIVE
PCP UR QL SCN: NEGATIVE
PH UR STRIP.AUTO: 5 [PH]
PLATELET # BLD AUTO: 376 K/UL (ref 164–446)
PMV BLD AUTO: 11.5 FL (ref 9–12.9)
POTASSIUM SERPL-SCNC: 4.2 MMOL/L (ref 3.6–5.5)
POTASSIUM SERPL-SCNC: 4.4 MMOL/L (ref 3.6–5.5)
PROPOXYPH UR QL SCN: NEGATIVE
PROT SERPL-MCNC: 7.5 G/DL (ref 6–8.2)
PROT UR QL STRIP: 30 MG/DL
PROTHROMBIN TIME: 68 SEC (ref 12–14.6)
RBC # BLD AUTO: 3.92 M/UL (ref 4.2–5.4)
RBC # URNS HPF: ABNORMAL /HPF
RBC UR QL AUTO: NEGATIVE
SODIUM SERPL-SCNC: 141 MMOL/L (ref 135–145)
SODIUM SERPL-SCNC: 145 MMOL/L (ref 135–145)
SODIUM UR-SCNC: 32 MMOL/L
SP GR UR STRIP.AUTO: 1.02
T4 FREE SERPL-MCNC: 1.02 NG/DL (ref 0.53–1.43)
TREPONEMA PALLIDUM IGG+IGM AB [PRESENCE] IN SERUM OR PLASMA BY IMMUNOASSAY: NON REACTIVE
TROPONIN I SERPL-MCNC: 0.13 NG/ML (ref 0–0.04)
TROPONIN I SERPL-MCNC: 0.15 NG/ML (ref 0–0.04)
TROPONIN I SERPL-MCNC: 0.18 NG/ML (ref 0–0.04)
TSH SERPL DL<=0.005 MIU/L-ACNC: 2.3 UIU/ML (ref 0.38–5.33)
UROBILINOGEN UR STRIP.AUTO-MCNC: 0.2 MG/DL
UUN UR-MCNC: 440 MG/DL
VIT B12 SERPL-MCNC: 1096 PG/ML (ref 211–911)
WBC # BLD AUTO: 16.9 K/UL (ref 4.8–10.8)
WBC #/AREA URNS HPF: ABNORMAL /HPF

## 2018-10-05 PROCEDURE — 51798 US URINE CAPACITY MEASURE: CPT

## 2018-10-05 PROCEDURE — 36415 COLL VENOUS BLD VENIPUNCTURE: CPT

## 2018-10-05 PROCEDURE — 84484 ASSAY OF TROPONIN QUANT: CPT

## 2018-10-05 PROCEDURE — 96368 THER/DIAG CONCURRENT INF: CPT

## 2018-10-05 PROCEDURE — 96366 THER/PROPH/DIAG IV INF ADDON: CPT

## 2018-10-05 PROCEDURE — 87040 BLOOD CULTURE FOR BACTERIA: CPT

## 2018-10-05 PROCEDURE — 700111 HCHG RX REV CODE 636 W/ 250 OVERRIDE (IP): Performed by: EMERGENCY MEDICINE

## 2018-10-05 PROCEDURE — 700105 HCHG RX REV CODE 258: Performed by: EMERGENCY MEDICINE

## 2018-10-05 PROCEDURE — G8996 SWALLOW CURRENT STATUS: HCPCS | Mod: CL

## 2018-10-05 PROCEDURE — 83605 ASSAY OF LACTIC ACID: CPT

## 2018-10-05 PROCEDURE — 84300 ASSAY OF URINE SODIUM: CPT

## 2018-10-05 PROCEDURE — 96367 TX/PROPH/DG ADDL SEQ IV INF: CPT

## 2018-10-05 PROCEDURE — 700105 HCHG RX REV CODE 258: Performed by: INTERNAL MEDICINE

## 2018-10-05 PROCEDURE — 82607 VITAMIN B-12: CPT

## 2018-10-05 PROCEDURE — G0475 HIV COMBINATION ASSAY: HCPCS

## 2018-10-05 PROCEDURE — 82746 ASSAY OF FOLIC ACID SERUM: CPT

## 2018-10-05 PROCEDURE — 700111 HCHG RX REV CODE 636 W/ 250 OVERRIDE (IP): Performed by: HOSPITALIST

## 2018-10-05 PROCEDURE — 700105 HCHG RX REV CODE 258: Performed by: HOSPITALIST

## 2018-10-05 PROCEDURE — 700102 HCHG RX REV CODE 250 W/ 637 OVERRIDE(OP): Performed by: HOSPITALIST

## 2018-10-05 PROCEDURE — 84540 ASSAY OF URINE/UREA-N: CPT

## 2018-10-05 PROCEDURE — G8997 SWALLOW GOAL STATUS: HCPCS | Mod: CI

## 2018-10-05 PROCEDURE — 99223 1ST HOSP IP/OBS HIGH 75: CPT | Mod: AI | Performed by: HOSPITALIST

## 2018-10-05 PROCEDURE — 92610 EVALUATE SWALLOWING FUNCTION: CPT

## 2018-10-05 PROCEDURE — 96365 THER/PROPH/DIAG IV INF INIT: CPT

## 2018-10-05 PROCEDURE — 86780 TREPONEMA PALLIDUM: CPT

## 2018-10-05 PROCEDURE — A9270 NON-COVERED ITEM OR SERVICE: HCPCS | Performed by: HOSPITALIST

## 2018-10-05 PROCEDURE — 80048 BASIC METABOLIC PNL TOTAL CA: CPT

## 2018-10-05 PROCEDURE — 93005 ELECTROCARDIOGRAM TRACING: CPT

## 2018-10-05 PROCEDURE — 770020 HCHG ROOM/CARE - TELE (206)

## 2018-10-05 PROCEDURE — 71045 X-RAY EXAM CHEST 1 VIEW: CPT

## 2018-10-05 RX ORDER — ONDANSETRON 2 MG/ML
4 INJECTION INTRAMUSCULAR; INTRAVENOUS EVERY 4 HOURS PRN
Status: DISCONTINUED | OUTPATIENT
Start: 2018-10-05 | End: 2018-10-09 | Stop reason: HOSPADM

## 2018-10-05 RX ORDER — SODIUM CHLORIDE 9 MG/ML
500 INJECTION, SOLUTION INTRAVENOUS ONCE
Status: COMPLETED | OUTPATIENT
Start: 2018-10-05 | End: 2018-10-05

## 2018-10-05 RX ORDER — AMOXICILLIN 250 MG
2 CAPSULE ORAL 2 TIMES DAILY
Status: DISCONTINUED | OUTPATIENT
Start: 2018-10-05 | End: 2018-10-07

## 2018-10-05 RX ORDER — LISINOPRIL 5 MG/1
5 TABLET ORAL 2 TIMES DAILY
Status: DISCONTINUED | OUTPATIENT
Start: 2018-10-05 | End: 2018-10-09 | Stop reason: HOSPADM

## 2018-10-05 RX ORDER — ENALAPRILAT 1.25 MG/ML
1.25 INJECTION INTRAVENOUS EVERY 6 HOURS PRN
Status: DISCONTINUED | OUTPATIENT
Start: 2018-10-05 | End: 2018-10-09 | Stop reason: HOSPADM

## 2018-10-05 RX ORDER — LEVOTHYROXINE SODIUM 0.12 MG/1
125 TABLET ORAL
Status: DISCONTINUED | OUTPATIENT
Start: 2018-10-05 | End: 2018-10-09 | Stop reason: HOSPADM

## 2018-10-05 RX ORDER — SODIUM CHLORIDE 9 MG/ML
2000 INJECTION, SOLUTION INTRAVENOUS ONCE
Status: COMPLETED | OUTPATIENT
Start: 2018-10-05 | End: 2018-10-05

## 2018-10-05 RX ORDER — OMEPRAZOLE 20 MG/1
20 CAPSULE, DELAYED RELEASE ORAL DAILY
Status: DISCONTINUED | OUTPATIENT
Start: 2018-10-05 | End: 2018-10-09 | Stop reason: HOSPADM

## 2018-10-05 RX ORDER — ONDANSETRON 4 MG/1
4 TABLET, ORALLY DISINTEGRATING ORAL EVERY 4 HOURS PRN
Status: DISCONTINUED | OUTPATIENT
Start: 2018-10-05 | End: 2018-10-09 | Stop reason: HOSPADM

## 2018-10-05 RX ORDER — ESOMEPRAZOLE MAGNESIUM 40 MG/1
40 CAPSULE, DELAYED RELEASE ORAL
Status: DISCONTINUED | OUTPATIENT
Start: 2018-10-05 | End: 2018-10-05

## 2018-10-05 RX ORDER — HEPARIN SODIUM 5000 [USP'U]/ML
5000 INJECTION, SOLUTION INTRAVENOUS; SUBCUTANEOUS EVERY 8 HOURS
Status: DISCONTINUED | OUTPATIENT
Start: 2018-10-05 | End: 2018-10-05

## 2018-10-05 RX ORDER — BISOPROLOL FUMARATE 5 MG/1
2.5 TABLET, FILM COATED ORAL DAILY
Status: DISCONTINUED | OUTPATIENT
Start: 2018-10-05 | End: 2018-10-08

## 2018-10-05 RX ORDER — POLYETHYLENE GLYCOL 3350 17 G/17G
1 POWDER, FOR SOLUTION ORAL
Status: DISCONTINUED | OUTPATIENT
Start: 2018-10-05 | End: 2018-10-07

## 2018-10-05 RX ORDER — BUDESONIDE AND FORMOTEROL FUMARATE DIHYDRATE 160; 4.5 UG/1; UG/1
2 AEROSOL RESPIRATORY (INHALATION)
Status: DISCONTINUED | OUTPATIENT
Start: 2018-10-05 | End: 2018-10-09 | Stop reason: HOSPADM

## 2018-10-05 RX ORDER — OXYCODONE AND ACETAMINOPHEN 10; 325 MG/1; MG/1
1 TABLET ORAL EVERY 6 HOURS PRN
Status: DISCONTINUED | OUTPATIENT
Start: 2018-10-05 | End: 2018-10-09 | Stop reason: HOSPADM

## 2018-10-05 RX ORDER — MORPHINE SULFATE 15 MG/1
15 TABLET, FILM COATED, EXTENDED RELEASE ORAL EVERY 12 HOURS
Status: DISCONTINUED | OUTPATIENT
Start: 2018-10-05 | End: 2018-10-05

## 2018-10-05 RX ORDER — TOLTERODINE 4 MG/1
4 CAPSULE, EXTENDED RELEASE ORAL DAILY
Status: DISCONTINUED | OUTPATIENT
Start: 2018-10-05 | End: 2018-10-09 | Stop reason: HOSPADM

## 2018-10-05 RX ORDER — BUPROPION HYDROCHLORIDE 150 MG/1
150 TABLET, EXTENDED RELEASE ORAL 2 TIMES DAILY
Status: DISCONTINUED | OUTPATIENT
Start: 2018-10-05 | End: 2018-10-09 | Stop reason: HOSPADM

## 2018-10-05 RX ORDER — BUPROPION HYDROCHLORIDE 300 MG/1
300 TABLET ORAL EVERY MORNING
Status: DISCONTINUED | OUTPATIENT
Start: 2018-10-05 | End: 2018-10-05

## 2018-10-05 RX ORDER — ACETAMINOPHEN 325 MG/1
650 TABLET ORAL EVERY 6 HOURS PRN
Status: DISCONTINUED | OUTPATIENT
Start: 2018-10-05 | End: 2018-10-09 | Stop reason: HOSPADM

## 2018-10-05 RX ORDER — BISACODYL 10 MG
10 SUPPOSITORY, RECTAL RECTAL
Status: DISCONTINUED | OUTPATIENT
Start: 2018-10-05 | End: 2018-10-07

## 2018-10-05 RX ORDER — SODIUM CHLORIDE 9 MG/ML
INJECTION, SOLUTION INTRAVENOUS CONTINUOUS
Status: DISCONTINUED | OUTPATIENT
Start: 2018-10-05 | End: 2018-10-08

## 2018-10-05 RX ORDER — ALBUTEROL SULFATE 90 UG/1
2 AEROSOL, METERED RESPIRATORY (INHALATION) EVERY 6 HOURS PRN
Status: DISCONTINUED | OUTPATIENT
Start: 2018-10-05 | End: 2018-10-09 | Stop reason: HOSPADM

## 2018-10-05 RX ADMIN — SODIUM CHLORIDE 2000 ML: 9 INJECTION, SOLUTION INTRAVENOUS at 00:52

## 2018-10-05 RX ADMIN — SODIUM CHLORIDE: 9 INJECTION, SOLUTION INTRAVENOUS at 16:23

## 2018-10-05 RX ADMIN — SODIUM CHLORIDE 500 ML: 9 INJECTION, SOLUTION INTRAVENOUS at 15:50

## 2018-10-05 RX ADMIN — PHYTONADIONE 10 MG: 10 INJECTION, EMULSION INTRAMUSCULAR; INTRAVENOUS; SUBCUTANEOUS at 04:02

## 2018-10-05 RX ADMIN — VANCOMYCIN HYDROCHLORIDE 1900 MG: 100 INJECTION, POWDER, LYOPHILIZED, FOR SOLUTION INTRAVENOUS at 01:36

## 2018-10-05 RX ADMIN — PIPERACILLIN AND TAZOBACTAM 3.38 G: 3; .375 INJECTION, POWDER, LYOPHILIZED, FOR SOLUTION INTRAVENOUS; PARENTERAL at 00:53

## 2018-10-05 RX ADMIN — SODIUM CHLORIDE: 9 INJECTION, SOLUTION INTRAVENOUS at 02:15

## 2018-10-05 ASSESSMENT — COGNITIVE AND FUNCTIONAL STATUS - GENERAL
TOILETING: A LITTLE
CLIMB 3 TO 5 STEPS WITH RAILING: A LITTLE
DRESSING REGULAR LOWER BODY CLOTHING: A LITTLE
PERSONAL GROOMING: A LITTLE
DAILY ACTIVITIY SCORE: 18
WALKING IN HOSPITAL ROOM: A LITTLE
MOVING FROM LYING ON BACK TO SITTING ON SIDE OF FLAT BED: A LITTLE
MOVING TO AND FROM BED TO CHAIR: A LITTLE
EATING MEALS: A LITTLE
MOBILITY SCORE: 18
DRESSING REGULAR UPPER BODY CLOTHING: A LITTLE
TURNING FROM BACK TO SIDE WHILE IN FLAT BAD: A LITTLE
STANDING UP FROM CHAIR USING ARMS: A LITTLE
SUGGESTED CMS G CODE MODIFIER DAILY ACTIVITY: CK
HELP NEEDED FOR BATHING: A LITTLE
SUGGESTED CMS G CODE MODIFIER MOBILITY: CK

## 2018-10-05 ASSESSMENT — PATIENT HEALTH QUESTIONNAIRE - PHQ9
2. FEELING DOWN, DEPRESSED, IRRITABLE, OR HOPELESS: NOT AT ALL
SUM OF ALL RESPONSES TO PHQ9 QUESTIONS 1 AND 2: 0
1. LITTLE INTEREST OR PLEASURE IN DOING THINGS: NOT AT ALL

## 2018-10-05 ASSESSMENT — PAIN SCALES - GENERAL
PAINLEVEL_OUTOF10: 0
PAINLEVEL_OUTOF10: 0

## 2018-10-05 NOTE — THERAPY
"  Speech Language Therapy Clinical Swallow Evaluation completed.  Functional Status: Per nurs, loss of water bolus anteriorly during nursing screening for swallow. Pt with fluctuating sleeping and eyes open but with variable directive following. Pt with decreased left eye opening with pt stating this happens when she first wakes up and is related to a childhood injury. Pt stating she lives in \"[place]\" and that her son lives with her. Pt visualizing SLP but very inconsistent imitating or following oral motor directives. Pt with jerking type movement of her upper body, asterixis like. Max verbal and tactile cues to open oral cavity for 2-1/4 amounts each of NTL water from spoon and applesauce. Pt requiring max verbal cues to close her oral cavity and to swallow. Swallow triggered within 5 seconds. No coughing post swallow but pt at very high risk for airway penetration and or aspiration related to current altered status. SLP collaborated with nurs regarding pt not recommended for oral meds or PO intake. Consider non-oral meds as appropriate. SLP will reassess on Saturday. Pt with no source of nutrition at this time.   Recommendations - Diet:  NPO                          Strategies: to be determined                          Medication Administration:  Non-oral  Plan of Care: Will benefit from Speech Therapy 3 times per week  Post-Acute Therapy: dysphagia. Thanks Celso    See \"Rehab Therapy-Acute\" Patient Summary Report for complete documentation.   "

## 2018-10-05 NOTE — CARE PLAN
Problem: Safety  Goal: Will remain free from injury  Outcome: PROGRESSING AS EXPECTED  Fall risk assessed, pt educated. Bed in lowest and locked position, call light and personal possessions within reach, bed alarm and frequent rounding in place.    Problem: Knowledge Deficit  Goal: Knowledge of disease process/condition, treatment plan, diagnostic tests, and medications will improve  Outcome: PROGRESSING SLOWER THAN EXPECTED  Pt educated about medications with each administration. Needs reinforcement.

## 2018-10-05 NOTE — ED TRIAGE NOTES
Chief Complaint   Patient presents with   • ALOC     Pt BIB [facility] for ALOC.  Per EMS pt was found down by son.  Pt GCS 8 on EMS arrival.  Pt AxOx2 in room.  Pt confused and repeating herself.       Per EMS FSBS 200.  Pt given 1 mg narcan with improvement by EMS.  Pt given 50 fentanyl and 1 mg versed in transport.

## 2018-10-05 NOTE — WOUND TEAM
"[facility]  Inpatient Services  Initial Wound and Skin Care Evaluation    Admission Date: 10/4/2018     Consult Date: 10/05/2018 @ 1511   HPI, PMH, SH: Reviewed    Unit where seen by Wound Team: [id number]     WOUND CONSULT RELATED TO:  Pressure ulcer to sacrum      SUBJECTIVE:  \"I sometimes sit a lot.\"      Self Report / Pain Level:  Pt in and out of sleep during assessment       OBJECTIVE: Inflated waffle overlay to mattress in place. Sacral mepilex in place.     WOUND TYPE, LOCATION, CHARACTERISTICS (Pressure Injuries: location, stage, POA or date identified)    Location and type of wound: Right buttock, sDTI, unstageable, POA         Periwound:        pink      Drainage:        none      Tissue Type and %:       100% maroon     Wound Edges:         attached  Odor:                   none  Exposed structure(s):                none  S&S of Infection:      none      Measurements:    (Obtained 10/05/2018)  Length:       0.5 cm  Width:         1cm   Depth:       BOO until wound reveals its true depth   Tracts/Undermining:     BOO      Lab Values:    WBC:       WBC   Date/Time Value Ref Range Status   10/04/2018 11:26 PM 16.9 (H) 4.8 - 10.8 K/uL Final   10/17/2012 09:40 AM 6.4 4.0 - 10.5 x10E3/uL Final     AIC:      Lab Results   Component Value Date/Time    HBA1C 5.5 10/04/2018 11:26 PM         Culture:   Deferred, no s/s of infection at this time    INTERVENTIONS BY WOUND TEAM:  Patient seen by this Wound Care RN. Pt assisted to left side. Sacrococcygeal area cleansed w/ warm wash cloth and No Rinse Foam. Patted dry with dry wash cloth. Sacrococcygeal area assessed. Palpated right proximal buttock, sDTI noted w/ pink periwound, intact, no drainage noted. Periwound blanching, but sDTI is non blanching. Patient has pink, but blanching intact skin on left proximal buttock. Discussed w/ bedside RN Otf, pt is retaining urine and has not had a BM at this time. Sacral mepilex placed. Pt assisted to left " side, pillow used for support. Nursing staff is getting patient out of bed and per Otf RN, pt is able to turn self w/ prompting. Skin order placed.     Dressing selection:  Sacral mepilex         Interdisciplinary consultation: Patient, Bedside RN (Otf),     EVALUATION: 74 y/o female admitted for encephalopathy. Pt w/ sDTI, sacral mepilex in place to redistribute pressure from sacrococcygeal area. Waffle overlay to mattress in place. Nursing will need to prompt and assist patient q2hrs to turn. sDTI should resolve in 1-2 weeks w/ offloading measures in place.     Factors affecting wound healing: Age, encephalopathy,   Goals: Steady decrease in wound area and depth weekly.    NURSING PLAN OF CARE ORDERS (X):    Dressing changes: See Dressing Maintenance orders:   Skin care: See Skin Care orders: X  Rectal tube care: See Rectal Tube Care orders:   Other orders:    RSKIN: CURRENT (X) ORDERED (O):   Q shift Jack:  X  Q shift pressure point assessments:  X  Pressure redistribution mattress   X         DAVID          Bariatric DAVID         Bariatric foam           Heel float boots O          Float Heels off Bed with Pillows     O          Barrier wipes    O     Barrier Cream         Barrier paste      O if incontinent     Sacral silicone dressing         Silicone O2 tubing         Anchorfast         Cannula fixation Device (Tender )          Gray Foam Ear protectors           Trach with Optifoam split foam                 Waffle cushion    O    Waffle Overlay     X    Rectal tube or BMS         Antifungal tx      Interdry          Turn q 2 hours      O Nursing to prompt pt to turn.   Up to chair      X  Ambulate    X  PT/OT        Dietician        Diabetes Education      PO    X TF     TPN     NPO   # days   Other        WOUND TEAM PLAN OF CARE (X):   NPWT change 3 x week:        Dressing changes by wound team:       Follow up as needed:    X   Other (explain):     Anticipated discharge plans (X):   SNF:            Home Care:           Outpatient Wound Center:            Self Care:            Other:        TBD

## 2018-10-05 NOTE — CARE PLAN
Problem: Safety  Goal: Will remain free from injury  Outcome: PROGRESSING AS EXPECTED    Intervention: Provide assistance with mobility   10/05/18 0500   OTHER   Assistance / Tolerance Assistance of Two or More;Tolerates Well     Intervention: Collaborate with Interdisciplinary Team for safe transfer and mobilization techniques   10/05/18 0500   OTHER   Assistive Devices Hand held assist       Goal: Will remain free from falls  Outcome: PROGRESSING AS EXPECTED    Intervention: Assess risk factors for falls   10/05/18 0500 10/05/18 0505   OTHER   History of fall --  0   Pt Calls for Assistance No --          Problem: Skin Integrity  Goal: Risk for impaired skin integrity will decrease  Outcome: PROGRESSING AS EXPECTED    Intervention: Implement precautions to protect skin integrity in collaboration with the interdisciplinary team   10/05/18 0810   OTHER   Skin Preventative Measures Pillows in Use to Float Heels;Pillows in Use for Support / Positioning;Silicone Oxygen Tubing in Use   Bed Types Low Air Loss Mattress   Friction Interventions Draw Sheet / Pad Used for Repositioning;Sacral Foam Dressing in Use   Moisturizers Barrier Cream;Barrier Wipes;Moisturizer    PT / OT Involved in Care (MD to place order as appropriate)   Activity  Bed;Range of motion;Edge of bed   Patient Turns / Repositioning Left Side;Reposition - RUE;Reposition - LUE;Reposition - RLE;Reposition - LLE   Assistance / Tolerance for Turning/Repositioning Assistance of Two or More;Tolerates Well   Patient is Receiving Nutrition Nothing by Mouth   Nutrition Consult Ordered No, Consult has Already been Placed   Vitamin Therapy in Use No     Intervention: Assess and monitor skin integrity, appearance and/or temperature  Wound consult placed for patient skin issues

## 2018-10-05 NOTE — ASSESSMENT & PLAN NOTE
Delirium resolved and she is denying pain presently  She is significantly debilitated due to her chronic pain  Considering hospice-family meeting this afternoon

## 2018-10-05 NOTE — PROGRESS NOTES
Report received at bedside, patient care assumed, tele box on. Pt AAO x 2 oriented to self and place, very lethargic, falling asleep during assessment. No complaint of pain at this time. Pt denies any additional needs at this time. Bed in lowest position, bed alarm on, pt educated on fall risk, pt has not tried to get out of bed at this time, will continue to reinforce. Call light and personal possessions within reach. Will continue to monitor.

## 2018-10-05 NOTE — ED PROVIDER NOTES
ED Provider Note    Primary care provider: Javon Reeves M.D.  Means of arrival: ems  History obtained from: ems & son  History limited by: AMS    CHIEF COMPLAINT  Chief Complaint   Patient presents with   • ALOC     Pt BIB [facility] for ALOC.  Per EMS pt was found down by son.  Pt GCS 8 on EMS arrival.  Pt AxOx2 in room.  Pt confused and repeating herself.         HPI  Dian Mendoza is a 73 y.o. female with past medical history significant for anemia, atrial tachycardias, hypokalemia, hypertension, hypothyroidism, who presents to the Emergency Department for altered mental status.  Per EMS patient was found down by son and when they arrived she had a GCS of 8.  Patient was given Narcan to which she had slight improvement in her mental status.  Her blood sugar on arrival was 200.  Patient cannot provide any history.  She does not know what happened that brought her into the hospital.  She is alert to self and to place.  Her only complaint at this time is nausea.  I asked her if she has chest pain or abdominal pain and she declines these.      Per chart review patient last saw her PCP in July 2018.  She was seen for excoriated papules in the bilateral lower extremities from itching.  She was also noted to have peptic ulcer disease, stress incontinence, and rheumatoid arthritis.  Per chart review patient is on warfarin for her atrial tachycardia.  She takes many medications.      REVIEW OF SYSTEMS  ROS  Review of systems is limited secondary to altered mental status patient denies any pain but states she is nauseated     PAST MEDICAL HISTORY   has a past medical history of Anemia; Atrial tachycardia, paroxysmal (HCC); Cancer of left breast (HCC) (1997); Cardiomyopathy (HCC) (01/06/2015); Cholelithiasis (2014); COPD (chronic obstructive pulmonary disease) (HCC); E. 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    SURGICAL HISTORY   has a past surgical history that includes ulcer debridement (6/5/08); flap closure (6/5/08); tonsillectomy; knee arthroscopy; other orthopedic surgery; bursa excision; mastectomy; knee replacement, total (2009); lumbar fusion posterior (1998); abdominal hysterectomy total (1970's); elbow orif (1950); recovery (12/8/2010); gastroscopy with biopsy (12/28/2011); egd w/endoscopic ultrasound (12/28/2011); ercp (12/28/2011); colonoscopy (2001, 6/2010); victoriano by laparoscopy (8/27/2014); and cataract extraction with iol (Bilateral).    SOCIAL HISTORY  Social History   Substance Use Topics   • Smoking status: Former Smoker     Packs/day: 1.50     Years: 40.00     Types: Cigarettes     Quit date: 1/1/1972   • Smokeless tobacco: Former User   • Alcohol use No      Comment: none since 2007      History   Drug Use No       FAMILY HISTORY  Family History   Problem Relation Age of Onset   • Heart Disease Sister    • Heart Disease Mother    • Heart Disease Father    • Cancer Father         esophageal   • Non-contributory Brother         copd, cirrhosis, alzheimer's mild   • Heart Disease Brother         3 MIs   • GI Sister         colon polyps, precancerous   • Cancer Sister         breast X 2       CURRENT MEDICATIONS  Home Medications    **Home medications have not yet been reviewed for this encounter**         ALLERGIES  Allergies   Allergen Reactions   • Beta Adrenergic Blockers      depression   • Atorvastatin      \"I fell and hit my head but it " "could've been the other medications that I was taken.\"   • Ezetimibe      \"I fell and hit my head but it could've been the other medications that I was taken.\"   • Metoprolol      \"I fell and hit my head but it could've been the other medications that I was taken.\"     • Other Environmental    • Spironolactone      Abrupt renal failure   • Statins [Hmg-Coa-R Inhibitors]      \"I fell and hit my head but it could've been the other medications that I was taken.\"   • Tape Hives and Rash     Plastic tape       PHYSICAL EXAM  VITAL SIGNS: BP (!) 93/76   Pulse 90   Temp 36.3 °C (97.3 °F)   Resp 16   Ht 1.626 m (5' 4\")   Wt 76 kg (167 lb 8.8 oz)   SpO2 94%   BMI 28.76 kg/m²   Vitals reviewed by myself.  Physical Exam   Constitutional: She is well-developed, well-nourished, and in no distress. No distress.   HENT:   Head: Normocephalic and atraumatic.   Dry mucous membranes   Eyes: Pupils are equal, round, and reactive to light. EOM are normal.   Pupils are 2 mm equal and reactive bilaterally   Neck: Normal range of motion.   Cardiovascular: Normal rate, regular rhythm and normal heart sounds.  Exam reveals no gallop and no friction rub.    No murmur heard.  Pulmonary/Chest: Effort normal and breath sounds normal. No respiratory distress. She has no wheezes. She has no rales.   Abdominal: Soft. Bowel sounds are normal. She exhibits no distension. There is no tenderness. There is no rebound.   Musculoskeletal:   Strength is 5 out of 5 in all extremities   Neurological: No cranial nerve deficit.   Alert and oriented to person and place only.  She does not recall events leading up to coming to the hospital.  No focal neuro deficits are noted.  Sensation is intact in all extremities.   Skin:   Patient is noted to have excoriations to her skin         DIAGNOSTIC STUDIES /  LABS  Labs Reviewed   CBC WITH DIFFERENTIAL - Abnormal; Notable for the following:        Result Value    WBC 16.9 (*)     RBC 3.92 (*)     Hemoglobin " 11.2 (*)     Hematocrit 36.3 (*)     MCHC 30.9 (*)     RDW 51.9 (*)     Neutrophils-Polys 89.20 (*)     Lymphocytes 4.40 (*)     Neutrophils (Absolute) 15.08 (*)     Lymphs (Absolute) 0.75 (*)     Monos (Absolute) 0.98 (*)     All other components within normal limits    Narrative:     Indicate which anticoagulants the patient is on:->UNKNOWN   COMP METABOLIC PANEL - Abnormal; Notable for the following:     Co2 13 (*)     Anion Gap 20.0 (*)     Glucose 150 (*)     Bun 73 (*)     Creatinine 4.73 (*)     All other components within normal limits    Narrative:     Indicate which anticoagulants the patient is on:->UNKNOWN   TROPONIN - Abnormal; Notable for the following:     Troponin I 0.18 (*)     All other components within normal limits    Narrative:     Indicate which anticoagulants the patient is on:->UNKNOWN   PROTHROMBIN TIME - Abnormal; Notable for the following:     PT 68.0 (*)     INR 8.20 (*)     All other components within normal limits    Narrative:     Indicate which anticoagulants the patient is on:->UNKNOWN   APTT - Abnormal; Notable for the following:     APTT 45.3 (*)     All other components within normal limits    Narrative:     Indicate which anticoagulants the patient is on:->UNKNOWN   BTYPE NATRIURETIC PEPTIDE - Abnormal; Notable for the following:     B Natriuretic Peptide 830 (*)     All other components within normal limits    Narrative:     Indicate which anticoagulants the patient is on:->UNKNOWN   URINALYSIS,CULTURE IF INDICATED - Abnormal; Notable for the following:     Character Cloudy (*)     Ketones Trace (*)     Protein 30 (*)     All other components within normal limits   DIAGNOSTIC ALCOHOL - Abnormal; Notable for the following:     Diagnostic Alcohol 0.01 (*)     All other components within normal limits    Narrative:     Indicate which anticoagulants the patient is on:->UNKNOWN   URINE DRUG SCREEN - Abnormal; Notable for the following:     Opiates Positive (*)     Oxycodone Positive  "(*)     All other components within normal limits   URINE MICROSCOPIC (W/UA) - Abnormal; Notable for the following:     Hyaline Cast 6-10 (*)     All other components within normal limits   ESTIMATED GFR - Abnormal; Notable for the following:     GFR If  11 (*)     GFR If Non  9 (*)     All other components within normal limits    Narrative:     Indicate which anticoagulants the patient is on:->UNKNOWN   TSH    Narrative:     Indicate which anticoagulants the patient is on:->UNKNOWN   FREE THYROXINE    Narrative:     Indicate which anticoagulants the patient is on:->UNKNOWN   LACTIC ACID   BLOOD CULTURE    Narrative:     Per Hospital Policy: Only change Specimen Src: to \"Line\" if  specified by physician order.   BLOOD CULTURE    Narrative:     Per Hospital Policy: Only change Specimen Src: to \"Line\" if  specified by physician order.   HEMOGLOBIN A1C   FLUID UREA NITROGEN   URINE SODIUM RANDOM       All labs reviewed by me.    EKG Interpretation:  Interpreted by myself    12 Lead EKG interpreted by me to show:  EKG at 10:11 PM: Atrial paced rhythm, heart rate reading incorrect on this EKG interpretation, actual heart rate is approximately 80, normal axis, normal intervals except for prolonged QR of 154 consistent with paced rhythm, there is a left bundle branch block consistent with paced rhythm, no evidence of acute arrhythmia or ischemia, there is a lot of baseline artifact, this is unchanged when compared to prior EKG from June 2018  My impression of this EKG: Does not indicate ischemia or arrhythmia at this time.    RADIOLOGY  DX-CHEST-PORTABLE (1 VIEW)   Final Result         1.  No focal infiltrates.   2.  Perihilar interstitial prominence and bronchial wall cuffing, appearance suggests changes of underlying bronchial inflammation, consider bronchitis.   3.  Hyperexpansion of the lungs suggest changes of COPD.      CT-HEAD W/O   Final Result         1.  No acute intracranial " abnormality is identified, there are nonspecific white matter changes, commonly associated with small vessel ischemic disease.  Associated mild cerebral atrophy is noted.   2.  Atherosclerosis.        The radiologist's interpretation of all radiological studies have been reviewed by me.        COURSE & MEDICAL DECISION MAKING  Nursing notes, Salvatore PICHARDO reviewed in chart.    Patient is a 73-year-old female who comes in for altered mental status.  Differential diagnosis includes arrhythmia, electrolyte disturbance, intracranial abnormality, acute coronary syndrome, infection.  Diagnostic workup includes labs, chest x-ray, EKG, and CT of the head.    Patient's initial vitals are within normal limits except for slight hypotension with blood pressure of 93 systolic.  Patient is treated with IV fluids after which blood pressure improved to the 110s systolic.  HYDRATION: Based on the patient's presentation of Dehydration and Hypotension the patient was given IV fluids. IV Hydration was used becasue oral hydration was not as rapid as required. Upon recheck following hydration, the patient was Feeling improved her blood pressure was improved.     EKG returns and demonstrates an atrial paced rhythm without any evidence of acute ischemia.  CT head returns demonstrates no acute intracranial abnormalities.  Chest x-ray returns demonstrates no focal infiltrates.  Patient has perihilar interstitial prominence concerning for possible bronchitis.  Labs returned and are notable for a leukocytosis of 16.9.  Patient's urinalysis demonstrates no signs of infection.  At this time I am uncertain of her leukocytosis etiology, however given her altered mental status I elected to start on broad-spectrum antibiotics and obtain blood cultures and lactate for possible sepsis.  Furthermore patient's labs returned and are also notable for a creatinine of 4.73, patient has no history of renal failure.  Prior creatinines have all been within normal  limits.  Her BUN is 73, it is likely this uremia that is causing her to be altered.  However her potassium is within normal limits.  Patient's INR is noted to be supratherapeutic at 8.2 and troponin is noted to be elevated at 0.18.  EKG demonstrates no evidence of acute ischemia, therefore I believe this is likely related to the renal failure.  Patient's lactate returns and is within normal limits.  At this time the etiology of patient's renal failure is unclear.  However I do believe that the uremia is likely contributing to her altered mental status.  As she does not have any acute disturbance of her electrolytes I will continue IV hydration as she does appear dehydrated clinically.  I discussed the case with Dr. Marcos who will admit the patient for further management.  At time of admission patient is in guarded condition.        FINAL IMPRESSION  1. Altered mental status, unspecified altered mental status type    2. Acute renal failure, unspecified acute renal failure type (HCC)    3. Hyponatremia    4. Elevated troponin    5. Supratherapeutic INR

## 2018-10-05 NOTE — ASSESSMENT & PLAN NOTE
Likely of chronic disease.  No evidence of bleed.  Monitor.  Transfuse if needed for hemoglobin less than 7 gm/dL

## 2018-10-05 NOTE — H&P
Hospital Medicine History & Physical Note    Date of Service  10/5/2018    Primary Care Physician  Javon Reeves M.D.    Consultants  none    Code Status  Full  Code     Chief Complaint  Altered mentation    History of Presenting Illness  73 y.o. female with history of pulmonary embolism on Coumadin therapy, essential hypertension controlled with medication regimen, and hypothyroidism on replacement therapy, was in her usual state of health until the day prior to admission.  At that time, she was found by her son unresponsive.  Emergency medical services were called, and she was given Narcan with limited improvement.  She currently cannot provide her own history, she states instead that she is trying to change the channel on the TV, although she is looking at her fingers and pressing her fingertips to do this.    Review of Systems  Review of Systems   Unable to perform ROS: Mental status change       Past Medical History   has a past medical history of Anemia; Atrial tachycardia, paroxysmal ([facility]); Cancer of left breast ([facility]) (1997); Cardiomyopathy ([facility]) (01/06/2015); Cholelithiasis (2014); COPD (chronic obstructive pulmonary disease) ([facility]); E. coli sepsis ([facility]); Full dentures; Gastritis; Hypertension ( ); Hypokalemia; Hypothyroidism; Inferior vena caval thrombosis ([facility]) (2/2011); LBBB (left bundle branch block); Lumbar disc disease with radiculopathy; MSSA (methicillin susceptible Staphylococcus aureus); Osteoarthritis; Protein C deficiency ([facility]); Protein S deficiency ([facility]); Pulmonary embolism ([facility]); Pulmonary hypertension ([facility]); Renal atrophy, right; Rheumatoid arthritis(714.0); S/P insertion of IVC (inferior vena caval) filter; Seizure ([facility]); Sleep apnea; Systolic and diastolic CHF, chronic ([facility]); and TMJ (temporomandibular joint syndrome).    Surgical History   has a past surgical history that includes ulcer debridement (6/5/08); flap closure (6/5/08); tonsillectomy; knee arthroscopy; other orthopedic surgery;  "bursa excision; mastectomy; knee replacement, total (2009); lumbar fusion posterior (1998); abdominal hysterectomy total (1970's); elbow orif (1950); recovery (12/8/2010); gastroscopy with biopsy (12/28/2011); egd w/endoscopic ultrasound (12/28/2011); ercp (12/28/2011); colonoscopy (2001, 6/2010); victoriano by laparoscopy (8/27/2014); and cataract extraction with iol (Bilateral).     Family History  family history includes Cancer in her father and sister; GI in her sister; Heart Disease in her brother, father, mother, and sister; Non-contributory in her brother.     Social History   reports that she quit smoking about 46 years ago. Her smoking use included Cigarettes. She has a 60.00 pack-year smoking history. She has quit using smokeless tobacco. She reports that she does not drink alcohol or use drugs.    Allergies  Allergies   Allergen Reactions   • Beta Adrenergic Blockers      depression   • Atorvastatin      \"I fell and hit my head but it could've been the other medications that I was taken.\"   • Ezetimibe      \"I fell and hit my head but it could've been the other medications that I was taken.\"   • Metoprolol      \"I fell and hit my head but it could've been the other medications that I was taken.\"     • Other Environmental    • Spironolactone      Abrupt renal failure   • Statins [Hmg-Coa-R Inhibitors]      \"I fell and hit my head but it could've been the other medications that I was taken.\"   • Tape Hives and Rash     Plastic tape       Medications  Prior to Admission Medications   Prescriptions Last Dose Informant Patient Reported? Taking?   Budesonide-Formoterol Fumarate (SYMBICORT INH) 6/26/2018  Yes No   Sig: Inhale  by mouth.   Simethicone 125 MG Cap 6/26/2018  Yes No   Sig: Spray 125 mg in mouth/throat as needed (gas).   albuterol (PROAIR HFA) 108 (90 Base) MCG/ACT Aero Soln inhalation aerosol   No No   Sig: Inhale 2 Puffs by mouth every 6 hours as needed for Shortness of Breath.   ammonium lactate " (LAC-HYDRIN) 12 % Lotion   No No   Sig: APPLY TO THE AFFECTED AREA TWICE DAILY   bisoprolol (ZEBETA) 5 MG Tab 6/26/2018  No No   Sig: Take 0.5 Tabs by mouth every day.   buPROPion (WELLBUTRIN XL) 300 MG XL tablet 6/26/2018  No No   Sig: Take 1 Tab by mouth every morning.   chlorpheniramine (CHLOR-TRIMETRON) 4 MG Tab 6/26/2018  Yes No   Sig: Take 4 mg by mouth every 6 hours as needed for Allergies.   docusate sodium (COLACE) 100 MG CAPS 6/26/2018 Historical Yes No   Sig: Take 200 mg by mouth 2 times a day.   esomeprazole (NEXIUM) 40 MG delayed-release capsule   No No   Sig: Take 1 Cap by mouth every morning with breakfast.   furosemide (LASIX) 40 MG Tab 6/26/2018  Yes No   Sig: Take 1 Tab by mouth every day.   levothyroxine (SYNTHROID) 125 MCG Tab 6/26/2018  No No   Sig: Take 1 Tab by mouth Every morning on an empty stomach.   lisinopril (PRINIVIL) 5 MG Tab 6/26/2018  No No   Sig: Take 1 Tab by mouth 2 times a day.   morphine ER (MS CONTIN) 15 MG Tab CR tablet 6/26/2018  Yes No   Sig: Take 15 mg by mouth every 12 hours.   mupirocin (BACTROBAN) 2 % Ointment 6/26/2018  No No   Sig: Apply 1 Inch to affected area(s) every day.   mupirocin (BACTROBAN) 2 % Ointment   No No   Sig: Apply to affected area twice daily   ondansetron (ZOFRAN) 4 MG Tab tablet 6/26/2018  No No   Sig: Take 1 Tab by mouth every 8 hours as needed for Nausea/Vomiting.   oxycodone-acetaminophen (PERCOCET)  MG Tab 6/26/2018  No No   Sig: Take 1 Tab by mouth every 6 hours as needed.   sennosides (SENOKOT) 8.6 MG TABS 6/26/2018 Historical Yes No   Sig: Take 8.6 mg by mouth every day. Indications: Constipation, **OTC**   tizanidine (ZANAFLEX) 4 MG Tab 6/26/2018  No No   Sig: Take 1 Tab by mouth every 8 hours as needed (nausea and vomiting).   tolterodine ER (DETROL LA) 4 MG CAPSULE SR 24 HR   No No   Sig: Take 1 Cap by mouth every day.   traZODone (DESYREL) 100 MG Tab 6/26/2018  No No   Sig: Take 2 Tabs by mouth every bedtime.   warfarin (COUMADIN) 3  MG Tab 6/26/2018  No No   Sig: Take 2 Tabs by mouth every day.      Facility-Administered Medications: None       Physical Exam  Temp:  [36.3 °C (97.3 °F)] 36.3 °C (97.3 °F)  Pulse:  [] 97  Resp:  [16-18] 16  BP: (93)/(76) 93/76    Physical Exam   Constitutional: She appears well-developed and well-nourished. No distress.   HENT:   Head: Normocephalic and atraumatic.   Eyes: Pupils are equal, round, and reactive to light. Conjunctivae are normal.   Neck: Normal range of motion. Neck supple. No tracheal deviation present. No thyromegaly present.   Cardiovascular: Normal rate, regular rhythm and normal heart sounds.  Exam reveals no gallop and no friction rub.    No murmur heard.  Pulmonary/Chest: Effort normal and breath sounds normal. No respiratory distress. She has no wheezes. She has no rales.   Abdominal: Soft. Bowel sounds are normal. She exhibits no distension. There is no tenderness. There is no rebound.   Musculoskeletal: Normal range of motion. She exhibits no edema.   Lymphadenopathy:     She has no cervical adenopathy.   Neurological: She is alert. No cranial nerve deficit.   Skin: Skin is warm and dry. She is not diaphoretic.   Psychiatric: She has a normal mood and affect.   Nursing note and vitals reviewed.      Laboratory:  Recent Labs      10/04/18   2326   WBC  16.9*   RBC  3.92*   HEMOGLOBIN  11.2*   HEMATOCRIT  36.3*   MCV  92.6   MCH  28.6   MCHC  30.9*   RDW  51.9*   PLATELETCT  376   MPV  11.5     Recent Labs      10/04/18   2326   SODIUM  141   POTASSIUM  4.4   CHLORIDE  108   CO2  13*   GLUCOSE  150*   BUN  73*   CREATININE  4.73*   CALCIUM  8.6     Recent Labs      10/04/18   2326   ALTSGPT  21   ASTSGOT  31   ALKPHOSPHAT  63   TBILIRUBIN  0.2   GLUCOSE  150*     Recent Labs      10/04/18   2326   APTT  45.3*   INR  8.20*     Recent Labs      10/04/18   2326   BNPBTYPENAT  830*         Recent Labs      10/04/18   2326   TROPONINI  0.18*       Urinalysis:    Recent Labs      10/04/18    2337   SPECGRAVITY  1.022   GLUCOSEUR  Negative   KETONES  Trace*   NITRITE  Negative   LEUKESTERAS  Negative   WBCURINE  0-2   RBCURINE  0-2   BACTERIA  Negative   EPITHELCELL  Negative        Imaging:  DX-CHEST-PORTABLE (1 VIEW)   Final Result         1.  No focal infiltrates.   2.  Perihilar interstitial prominence and bronchial wall cuffing, appearance suggests changes of underlying bronchial inflammation, consider bronchitis.   3.  Hyperexpansion of the lungs suggest changes of COPD.      CT-HEAD W/O   Final Result         1.  No acute intracranial abnormality is identified, there are nonspecific white matter changes, commonly associated with small vessel ischemic disease.  Associated mild cerebral atrophy is noted.   2.  Atherosclerosis.            Assessment/Plan:  I anticipate this patient will require at least two midnights for appropriate medical management, necessitating inpatient admission.    * Acute encephalopathy   Assessment & Plan    Unclear etiology.  Question uremic encephalopathy given acute kidney injury.  Monitor.  Continue fluid therapy.        Acute renal failure superimposed on stage 3 chronic kidney disease (HCC)   Assessment & Plan    Continue fluids, check Fe urea        Normocytic anemia   Assessment & Plan    Likely of chronic disease.  No evidence of bleed.  Monitor.  Transfuse if needed for hemoglobin less than 7 gm/dL          Essential hypertension, benign- (present on admission)   Assessment & Plan    Currently with relative hypotension.  Improved with fluids.  Monitor.  Continue current medication regimen as tolerated        Chronic pain syndrome- (present on admission)   Assessment & Plan    Continue current pain medication regimen.            VTE prophylaxis: SCD, heparin

## 2018-10-05 NOTE — PROGRESS NOTES
Inpatient Anticoagulation Service Note    Date: 10/5/2018  Reason for Anticoagulation: Pulmonary Embolism, Other (Comments) (Protein C deficiency, protein S deficiency)        Hemoglobin Value: (!) 11.2  Hematocrit Value: (!) 36.3  Lab Platelet Value: 376  Target INR: 2.0 to 3.0    INR from last 7 days     Date/Time INR Value    10/04/18 2326 (!)  8.2        Dose from last 7 days     Date/Time Dose (mg)    10/05/18 0800  0        Average Dose (mg): unknown (pt altered, unable to be interviewed)   Significant Interactions: Thyroid Medications, Proton Pump Inhibitor     Reversal Agent Administered: Vitamin K  Intravenous (10 mg given 10/5 AM)    Comments:   Patient presents to the ED with supratherarapteutic INR and ALOC, on Coumadin for 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 No obvious symptoms of bleeding noted per [facility] ER RN and physicians. However, due to supratherapeutic INR, she received 10 mg of IV vitamin K. Suspect patient has had poor PO intake in recent days. Hgb/hct are low but better than pt's most recent baseline values. Regular diet ordered. Drug interactions noted. Will HOLD Coumadin for now, but would recommend resuming dosing as soon as clinically indicated given pt's high risk for thrombotic complications.     Plan:  No Coumadin today. INR ordered with AM labs daily.     Education Material Provided?: No (Warfarin PTA)    Pharmacist suggested discharge dosing: consider 2.5 mg daily, with outpatient INR follow up within 72 hours of discharge      Oneal Smith, PharmD

## 2018-10-05 NOTE — PROGRESS NOTES
Late Entry due to patient care:  Report received from Jeremi FLORIAN in ED; patient transported to unit on Zoll monitor with ACLS qualified RN and tech. Patient admitted to unit and skin check completed with Alfreda FLORIAN.

## 2018-10-05 NOTE — PROGRESS NOTES
Patient was admitted after midnight by my partner Dr. Marcos, please see his H&P for further details.  This is a 73-year-old female who was admitted for encephalopathy.  CT head, chest x-ray did not show any acute findings.  Patient's UA negative for acute infection.  Blood cultures pending.  MRI brain ordered for further evaluation.  Will order vitamin B12, folate, RPR and HIV to rule out reversible causes of encephalopathy.

## 2018-10-05 NOTE — PROGRESS NOTES
2 RN skin check completed. Patient has multiple scab like sores all over extremities. Small puncture fritz noted on right leg below knee. Small pink spot noted on right side of neck. Patient had bandage on it. Unsure as to what it is from. No documentation on it. Wound noted on sacrum, red, non blanching, picture uploaded to epic. Wound consult will be placed.

## 2018-10-05 NOTE — PROGRESS NOTES
Late Entry due to patient care: notified Hospitalist Prieto of patient bladder scan of 785 and pt report not being able to urinate. Also mentioned patient altered mental status and that patient was unable to take PO meds. Also updated her on patient current hemodynamics and labs. hospitalist stated that was fine to hold meds and she placed an order for straight cath. No further orders were given. Will continue to monitor.

## 2018-10-06 ENCOUNTER — APPOINTMENT (OUTPATIENT)
Dept: RADIOLOGY | Facility: MEDICAL CENTER | Age: 73
DRG: 071 | End: 2018-10-06
Attending: INTERNAL MEDICINE
Payer: MEDICARE

## 2018-10-06 PROBLEM — R21 RASH: Status: ACTIVE | Noted: 2018-10-06

## 2018-10-06 PROBLEM — Z45.2 EXHAUSTED VASCULAR ACCESS: Status: ACTIVE | Noted: 2018-10-06

## 2018-10-06 LAB
ANION GAP SERPL CALC-SCNC: 10 MMOL/L (ref 0–11.9)
BASOPHILS # BLD AUTO: 0.4 % (ref 0–1.8)
BASOPHILS # BLD: 0.03 K/UL (ref 0–0.12)
BUN SERPL-MCNC: 42 MG/DL (ref 8–22)
CALCIUM SERPL-MCNC: 8.6 MG/DL (ref 8.5–10.5)
CHLORIDE SERPL-SCNC: 127 MMOL/L (ref 96–112)
CO2 SERPL-SCNC: 16 MMOL/L (ref 20–33)
CREAT SERPL-MCNC: 1.45 MG/DL (ref 0.5–1.4)
EOSINOPHIL # BLD AUTO: 0.1 K/UL (ref 0–0.51)
EOSINOPHIL NFR BLD: 1.2 % (ref 0–6.9)
ERYTHROCYTE [DISTWIDTH] IN BLOOD BY AUTOMATED COUNT: 53.4 FL (ref 35.9–50)
GLUCOSE SERPL-MCNC: 151 MG/DL (ref 65–99)
HCT VFR BLD AUTO: 27.8 % (ref 37–47)
HGB BLD-MCNC: 9 G/DL (ref 12–16)
IMM GRANULOCYTES # BLD AUTO: 0.05 K/UL (ref 0–0.11)
IMM GRANULOCYTES NFR BLD AUTO: 0.6 % (ref 0–0.9)
LYMPHOCYTES # BLD AUTO: 0.75 K/UL (ref 1–4.8)
LYMPHOCYTES NFR BLD: 9.4 % (ref 22–41)
MCH RBC QN AUTO: 27.9 PG (ref 27–33)
MCHC RBC AUTO-ENTMCNC: 30.9 G/DL (ref 33.6–35)
MCV RBC AUTO: 90.5 FL (ref 81.4–97.8)
MONOCYTES # BLD AUTO: 0.53 K/UL (ref 0–0.85)
MONOCYTES NFR BLD AUTO: 6.6 % (ref 0–13.4)
NEUTROPHILS # BLD AUTO: 6.56 K/UL (ref 2–7.15)
NEUTROPHILS NFR BLD: 81.8 % (ref 44–72)
NRBC # BLD AUTO: 0 K/UL
NRBC BLD-RTO: 0 /100 WBC
PLATELET # BLD AUTO: 225 K/UL (ref 164–446)
PMV BLD AUTO: 11.5 FL (ref 9–12.9)
POTASSIUM SERPL-SCNC: 3.8 MMOL/L (ref 3.6–5.5)
RBC # BLD AUTO: 3.15 M/UL (ref 4.2–5.4)
SODIUM SERPL-SCNC: 153 MMOL/L (ref 135–145)
WBC # BLD AUTO: 8 K/UL (ref 4.8–10.8)

## 2018-10-06 PROCEDURE — 87040 BLOOD CULTURE FOR BACTERIA: CPT

## 2018-10-06 PROCEDURE — 99233 SBSQ HOSP IP/OBS HIGH 50: CPT | Performed by: INTERNAL MEDICINE

## 2018-10-06 PROCEDURE — 74018 RADEX ABDOMEN 1 VIEW: CPT

## 2018-10-06 PROCEDURE — 92526 ORAL FUNCTION THERAPY: CPT

## 2018-10-06 PROCEDURE — B54MZZA ULTRASONOGRAPHY OF RIGHT UPPER EXTREMITY VEINS, GUIDANCE: ICD-10-PCS | Performed by: INTERNAL MEDICINE

## 2018-10-06 PROCEDURE — A9270 NON-COVERED ITEM OR SERVICE: HCPCS | Performed by: HOSPITALIST

## 2018-10-06 PROCEDURE — 700105 HCHG RX REV CODE 258: Performed by: HOSPITALIST

## 2018-10-06 PROCEDURE — 770020 HCHG ROOM/CARE - TELE (206)

## 2018-10-06 PROCEDURE — 700102 HCHG RX REV CODE 250 W/ 637 OVERRIDE(OP): Performed by: HOSPITALIST

## 2018-10-06 PROCEDURE — 05HY33Z INSERTION OF INFUSION DEVICE INTO UPPER VEIN, PERCUTANEOUS APPROACH: ICD-10-PCS | Performed by: INTERNAL MEDICINE

## 2018-10-06 PROCEDURE — 700111 HCHG RX REV CODE 636 W/ 250 OVERRIDE (IP): Performed by: INTERNAL MEDICINE

## 2018-10-06 PROCEDURE — 36415 COLL VENOUS BLD VENIPUNCTURE: CPT

## 2018-10-06 PROCEDURE — 85025 COMPLETE CBC W/AUTO DIFF WBC: CPT

## 2018-10-06 PROCEDURE — 80048 BASIC METABOLIC PNL TOTAL CA: CPT

## 2018-10-06 RX ADMIN — SODIUM CHLORIDE: 9 INJECTION, SOLUTION INTRAVENOUS at 04:09

## 2018-10-06 RX ADMIN — OXYCODONE HYDROCHLORIDE AND ACETAMINOPHEN 1 TABLET: 10; 325 TABLET ORAL at 13:31

## 2018-10-06 RX ADMIN — LISINOPRIL 5 MG: 5 TABLET ORAL at 17:33

## 2018-10-06 RX ADMIN — ENALAPRILAT 1.25 MG: 1.25 INJECTION INTRAVENOUS at 08:02

## 2018-10-06 RX ADMIN — ENALAPRILAT 1.25 MG: 1.25 INJECTION INTRAVENOUS at 16:07

## 2018-10-06 RX ADMIN — SODIUM CHLORIDE: 9 INJECTION, SOLUTION INTRAVENOUS at 13:34

## 2018-10-06 RX ADMIN — BISACODYL 10 MG: 10 SUPPOSITORY RECTAL at 21:34

## 2018-10-06 RX ADMIN — SENNOSIDES AND DOCUSATE SODIUM 2 TABLET: 8.6; 5 TABLET ORAL at 17:33

## 2018-10-06 ASSESSMENT — PAIN SCALES - GENERAL
PAINLEVEL_OUTOF10: 5
PAINLEVEL_OUTOF10: 0
PAINLEVEL_OUTOF10: 7
PAINLEVEL_OUTOF10: 10
PAINLEVEL_OUTOF10: 0

## 2018-10-06 NOTE — PROGRESS NOTES
Assumed care of pt. Bedside report received from day R.N. Pt denies chest pain or SOB. VSS. Pt attempted to void but couldn't. Pt resting comfortably. A & O x 2. Unsure of date and event. Son at bedside. All needs met. Bed low and locked, call light in reach. Discussed POC. Bed alarm on.

## 2018-10-06 NOTE — RESPIRATORY CARE
COPD EDUCATION by COPD CLINICAL EDUCATOR  10/6/2018 at 6:36 AM by Nicol Cortez     Patient reviewed by COPD education team. Patient does not qualify for COPD program at this time

## 2018-10-06 NOTE — PROGRESS NOTES
Inpatient Anticoagulation Service Note    Date: 10/6/2018  Reason for Anticoagulation: Pulmonary Embolism, Other (Comments) (Protein C deficiency, protein S deficiency)        Hemoglobin Value: (!) 11.2  Hematocrit Value: (!) 36.3  Lab Platelet Value: 376  Target INR: 2.0 to 3.0    INR from last 7 days     Date/Time INR Value    10/04/18 2326 (!)  8.2        Dose from last 7 days     Date/Time Dose (mg)    10/06/18 1300  0    10/05/18 0800  0        Average Dose (mg):  (unknown (pt altered, unable to be interviewed) )  Significant Interactions: Thyroid Medications, Proton Pump Inhibitor  Bridge Therapy: No     Reversal Agent Administered: Vitamin K  Intravenous (10 mg given 10/5 AM)  Comments: Unable to obtain INR level d/t IV access. Assuming INR is stil supratherapeutic given previous level. Per discussion with MD Kathleen marcelo with hold warfarin as well. No dose today. NNL to assess h/h; no indication of active bleeding. No new DDI.     Plan:  Hold today's warfarin with INR check tomorrow   Education Material Provided?: No (Warfarin PTA)  Pharmacist suggested discharge dosing: WOLFGANG Chi, PharmD., BCPS

## 2018-10-06 NOTE — PROGRESS NOTES
Lab unable to pull blood from venous source with multiple attempts. MD called and ok for arterial draw for this occurrence.

## 2018-10-06 NOTE — PROGRESS NOTES
Assumed care of patient, received bedside report from NOC RN, Pt A&Ox2 to self and place, on 2L of O2 via NC no SOB pt confused, restless. Call light within reach, personal belongings within reach.  Bed is locked and in lowest position, Bed Strip alarm on. Tele monitor on. Hourly rounding in place.

## 2018-10-06 NOTE — PROGRESS NOTES
MD updated about patients urine retention. Will order bladder scan and when pt has >500 will place horn for retention.

## 2018-10-06 NOTE — PROGRESS NOTES
2 RN skin check:     Scabs and red marks scattered throughout upper and lower extremities with different stages of healing.  Ears are red and slow to aster. (applied cannula fixation devices on cheeks bilaterally)  Sacrum is red, non-blanching and has DTI. Mepilex in place. Q2 turns in place and ulcer prevention measures in place.

## 2018-10-06 NOTE — CARE PLAN
Problem: Safety  Goal: Will remain free from falls  Outcome: PROGRESSING AS EXPECTED  Patient educated on patient safety and fall precautions. Patient unable to verbalize understanding. Bed alarm in place    Problem: Infection  Goal: Will remain free from infection  Outcome: PROGRESSING AS EXPECTED   Implement standard precautions and perform hand washing before and after patient contact. RN will follow protocols and necessary steps to minimize the spread of infection. RN educated pt and and any visitors on proper hand hygiene.

## 2018-10-06 NOTE — PROGRESS NOTES
2 RN skin check:  Elbows red blanching.  Heels red blanching.   Bilateral legs and arms have scattered scabs, red spots.   Bottom has DTI red non blanching. (mepilex applied).   Gray foam on o2 tubing.   Turns in place.

## 2018-10-06 NOTE — PROGRESS NOTES
2 RN skin check with CHIQUI Hewitt  Bilateral arms and legs have small healing scabs and healed scars, sacrum is red and not blanching, mepilex in place, wound care has assessed this wound. All other areas of skin clean, dry, intact.    Waffle mattress and gray foams in place for prevention.

## 2018-10-06 NOTE — CARE PLAN
Problem: Safety  Goal: Will remain free from injury  Outcome: PROGRESSING SLOWER THAN EXPECTED  Fall risk assessed, fall precautions in place, bed alarm on, educated on importance of using call light and level of fall risk will continue to reinforce education.       Problem: Knowledge Deficit  Goal: Knowledge of disease process/condition, treatment plan, diagnostic tests, and medications will improve  Outcome: PROGRESSING SLOWER THAN EXPECTED  Pt educated on POC, treatment and diet. Pt AOx2 to person and place, no learning evidence will continue to reinforce education and answer questions.

## 2018-10-06 NOTE — THERAPY
Speech Language Therapy dysphagia treatment completed.   Functional Status:  Patient seen for swallow reassessment.  Patient awake, alert and oriented to person and place with some confusion to date, recent events and episode.  Patient with delayed responses to questions at times and really struggles to formulate thoughts.  She was able to follow commands with minimal delay.  Vocal quality was clear. Upper dentures present at bedside and placed in oral cavity.  Patient reports her lower dentures are at home, but that they really do not fit well.  Presentation of PO consisted of ice, nectars, thin liquids, purees and soft solids.  Patient tolerated nectars and purees without any overt S/Sx of aspiration.  She did have minimal delay in swallow initiation and laryngeal elevation was noted to be weak.  She had no overt S/Sx of aspiration at all on nectars (4 ounces consumed) or purees (consumed 8 ounces).  On thin liquids, she did have slight wet voice following swallow and typically triggered 3-4 more subsequent swallows (some were audible) after the initial swallow which can be concerning for possible pharyngeal residue/penetration/aspiration.  On soft solids, patient with prolonged mastication taking up to 2+ minutes to chew and was also noted to talk during the oral phase.  She did swallow a few boluses with slight changes in vocal quality, but then there were times in which she spit out the food stating it was too difficult to chew and swallow. At this time, would recommend dysphagia I diet with NTL with direct 1:1 supervision for all PO intake.  SLP will follow for dysphagia therapy and diet upgrade as appropriate.  Discussed results of session with MD and RN.  Thank you for the consult.    Recommendations: Dysphagia I diet with NTL.  1:1 assistance for all PO: small bites and sips: no straws: TV off for PO intake to minimize distractions    Plan of Care: Will benefit from Speech Therapy 3 times per  "week    Post-Acute Therapy: Discharge to a transitional care facility for continued skilled therapy services.    See \"Rehab Therapy-Acute\" Patient Summary Report for complete documentation.     "

## 2018-10-07 ENCOUNTER — APPOINTMENT (OUTPATIENT)
Dept: RADIOLOGY | Facility: MEDICAL CENTER | Age: 73
DRG: 071 | End: 2018-10-07
Attending: INTERNAL MEDICINE
Payer: MEDICARE

## 2018-10-07 PROBLEM — E83.39 HYPOPHOSPHATEMIA: Status: ACTIVE | Noted: 2018-10-07

## 2018-10-07 PROBLEM — E87.0 HYPERNATREMIA: Status: ACTIVE | Noted: 2018-10-07

## 2018-10-07 PROBLEM — D63.8 ANEMIA, CHRONIC DISEASE: Status: ACTIVE | Noted: 2018-10-07

## 2018-10-07 LAB
AMMONIA PLAS-SCNC: 57 UMOL/L (ref 11–45)
ANION GAP SERPL CALC-SCNC: 7 MMOL/L (ref 0–11.9)
BUN SERPL-MCNC: 31 MG/DL (ref 8–22)
CALCIUM SERPL-MCNC: 8.6 MG/DL (ref 8.5–10.5)
CHLORIDE SERPL-SCNC: 129 MMOL/L (ref 96–112)
CO2 SERPL-SCNC: 18 MMOL/L (ref 20–33)
CREAT SERPL-MCNC: 1.11 MG/DL (ref 0.5–1.4)
GLUCOSE SERPL-MCNC: 111 MG/DL (ref 65–99)
INR PPP: 1.2 (ref 0.87–1.13)
MAGNESIUM SERPL-MCNC: 2.2 MG/DL (ref 1.5–2.5)
PHOSPHATE SERPL-MCNC: 1.1 MG/DL (ref 2.5–4.5)
POTASSIUM SERPL-SCNC: 3.5 MMOL/L (ref 3.6–5.5)
PROTHROMBIN TIME: 15.4 SEC (ref 12–14.6)
SODIUM SERPL-SCNC: 154 MMOL/L (ref 135–145)

## 2018-10-07 PROCEDURE — 82140 ASSAY OF AMMONIA: CPT

## 2018-10-07 PROCEDURE — A9270 NON-COVERED ITEM OR SERVICE: HCPCS | Performed by: INTERNAL MEDICINE

## 2018-10-07 PROCEDURE — 99233 SBSQ HOSP IP/OBS HIGH 50: CPT | Performed by: INTERNAL MEDICINE

## 2018-10-07 PROCEDURE — 36569 INSJ PICC 5 YR+ W/O IMAGING: CPT

## 2018-10-07 PROCEDURE — 700111 HCHG RX REV CODE 636 W/ 250 OVERRIDE (IP): Performed by: INTERNAL MEDICINE

## 2018-10-07 PROCEDURE — 84100 ASSAY OF PHOSPHORUS: CPT

## 2018-10-07 PROCEDURE — 85610 PROTHROMBIN TIME: CPT

## 2018-10-07 PROCEDURE — 76942 ECHO GUIDE FOR BIOPSY: CPT

## 2018-10-07 PROCEDURE — 700101 HCHG RX REV CODE 250: Performed by: INTERNAL MEDICINE

## 2018-10-07 PROCEDURE — 700105 HCHG RX REV CODE 258: Performed by: HOSPITALIST

## 2018-10-07 PROCEDURE — 700102 HCHG RX REV CODE 250 W/ 637 OVERRIDE(OP): Performed by: INTERNAL MEDICINE

## 2018-10-07 PROCEDURE — A9270 NON-COVERED ITEM OR SERVICE: HCPCS | Performed by: HOSPITALIST

## 2018-10-07 PROCEDURE — 80048 BASIC METABOLIC PNL TOTAL CA: CPT

## 2018-10-07 PROCEDURE — 36415 COLL VENOUS BLD VENIPUNCTURE: CPT

## 2018-10-07 PROCEDURE — 770020 HCHG ROOM/CARE - TELE (206)

## 2018-10-07 PROCEDURE — 700102 HCHG RX REV CODE 250 W/ 637 OVERRIDE(OP): Performed by: HOSPITALIST

## 2018-10-07 PROCEDURE — 83735 ASSAY OF MAGNESIUM: CPT

## 2018-10-07 RX ORDER — WARFARIN SODIUM 6 MG/1
6 TABLET ORAL
Status: DISCONTINUED | OUTPATIENT
Start: 2018-10-07 | End: 2018-10-09 | Stop reason: HOSPADM

## 2018-10-07 RX ORDER — SODIUM CHLORIDE AND POTASSIUM CHLORIDE 150; 450 MG/100ML; MG/100ML
INJECTION, SOLUTION INTRAVENOUS CONTINUOUS
Status: DISCONTINUED | OUTPATIENT
Start: 2018-10-07 | End: 2018-10-09 | Stop reason: HOSPADM

## 2018-10-07 RX ORDER — POLYETHYLENE GLYCOL 3350 17 G/17G
1 POWDER, FOR SOLUTION ORAL 2 TIMES DAILY
Status: DISCONTINUED | OUTPATIENT
Start: 2018-10-07 | End: 2018-10-08

## 2018-10-07 RX ORDER — HYDRALAZINE HYDROCHLORIDE 50 MG/1
50 TABLET, FILM COATED ORAL ONCE
Status: COMPLETED | OUTPATIENT
Start: 2018-10-07 | End: 2018-10-07

## 2018-10-07 RX ORDER — AMOXICILLIN 250 MG
2 CAPSULE ORAL 2 TIMES DAILY
Status: DISCONTINUED | OUTPATIENT
Start: 2018-10-07 | End: 2018-10-09 | Stop reason: HOSPADM

## 2018-10-07 RX ORDER — BISACODYL 10 MG
10 SUPPOSITORY, RECTAL RECTAL
Status: DISCONTINUED | OUTPATIENT
Start: 2018-10-07 | End: 2018-10-09 | Stop reason: HOSPADM

## 2018-10-07 RX ORDER — HYDRALAZINE HYDROCHLORIDE 25 MG/1
25 TABLET, FILM COATED ORAL EVERY 4 HOURS PRN
Status: DISCONTINUED | OUTPATIENT
Start: 2018-10-07 | End: 2018-10-09 | Stop reason: HOSPADM

## 2018-10-07 RX ADMIN — SENNOSIDES AND DOCUSATE SODIUM 2 TABLET: 8.6; 5 TABLET ORAL at 05:32

## 2018-10-07 RX ADMIN — ENALAPRILAT 1.25 MG: 1.25 INJECTION INTRAVENOUS at 12:29

## 2018-10-07 RX ADMIN — DIBASIC SODIUM PHOSPHATE, MONOBASIC POTASSIUM PHOSPHATE AND MONOBASIC SODIUM PHOSPHATE 1 TABLET: 852; 155; 130 TABLET ORAL at 17:36

## 2018-10-07 RX ADMIN — BUDESONIDE AND FORMOTEROL FUMARATE DIHYDRATE 2 PUFF: 160; 4.5 AEROSOL RESPIRATORY (INHALATION) at 09:20

## 2018-10-07 RX ADMIN — POLYETHYLENE GLYCOL 3350 1 PACKET: 17 POWDER, FOR SOLUTION ORAL at 17:33

## 2018-10-07 RX ADMIN — OXYCODONE HYDROCHLORIDE AND ACETAMINOPHEN 1 TABLET: 10; 325 TABLET ORAL at 09:09

## 2018-10-07 RX ADMIN — POTASSIUM CHLORIDE AND SODIUM CHLORIDE: 450; 150 INJECTION, SOLUTION INTRAVENOUS at 17:42

## 2018-10-07 RX ADMIN — STANDARDIZED SENNA CONCENTRATE AND DOCUSATE SODIUM 2 TABLET: 8.6; 5 TABLET ORAL at 17:36

## 2018-10-07 RX ADMIN — LISINOPRIL 5 MG: 5 TABLET ORAL at 05:32

## 2018-10-07 RX ADMIN — SODIUM CHLORIDE: 9 INJECTION, SOLUTION INTRAVENOUS at 02:00

## 2018-10-07 RX ADMIN — BISOPROLOL FUMARATE 2.5 MG: 5 TABLET ORAL at 05:33

## 2018-10-07 RX ADMIN — LISINOPRIL 5 MG: 5 TABLET ORAL at 17:34

## 2018-10-07 RX ADMIN — LEVOTHYROXINE SODIUM 125 MCG: 125 TABLET ORAL at 05:32

## 2018-10-07 RX ADMIN — POTASSIUM CHLORIDE AND SODIUM CHLORIDE: 450; 150 INJECTION, SOLUTION INTRAVENOUS at 12:32

## 2018-10-07 RX ADMIN — HYDRALAZINE HYDROCHLORIDE 50 MG: 50 TABLET, FILM COATED ORAL at 09:06

## 2018-10-07 RX ADMIN — WARFARIN SODIUM 6 MG: 6 TABLET ORAL at 17:41

## 2018-10-07 ASSESSMENT — ENCOUNTER SYMPTOMS
NERVOUS/ANXIOUS: 0
CONSTIPATION: 0
DEPRESSION: 0
NAUSEA: 0
COUGH: 0
WEAKNESS: 1
SHORTNESS OF BREATH: 0
DIARRHEA: 0
VOMITING: 0
FEVER: 0
CHILLS: 0
ABDOMINAL PAIN: 0
DIZZINESS: 0
WHEEZING: 0
HEADACHES: 0

## 2018-10-07 ASSESSMENT — PAIN SCALES - GENERAL
PAINLEVEL_OUTOF10: 0
PAINLEVEL_OUTOF10: 0
PAINLEVEL_OUTOF10: 4
PAINLEVEL_OUTOF10: 0
PAINLEVEL_OUTOF10: 0
PAINLEVEL_OUTOF10: 4
PAINLEVEL_OUTOF10: 6

## 2018-10-07 NOTE — PROGRESS NOTES
"7.5 sec PSVT HR with maximum  then sustained 90s. Pt was sleeping at this time, awakened and stated she \"feels fine\". Vitally stable and returned to sleep. Dr Cantu notified, new orders received, will continue to monitor.   "

## 2018-10-07 NOTE — CARE PLAN
Problem: Knowledge Deficit  Goal: Knowledge of disease process/condition, treatment plan, diagnostic tests, and medications will improve    Intervention: Assess knowledge level of disease process/condition, treatment plan, diagnostic tests, and medications  Pt confused to situation, current condition. Will continue to monitor and educated on all aspects of care as provided.

## 2018-10-07 NOTE — PROGRESS NOTES
Bedside report received, pt care assumed. Pt awake, confused, following commands and appropriate with speech. Educated on call bell for assistance, not to get oob on own. Denies needs at this time. Will continue to monitor.

## 2018-10-07 NOTE — PROGRESS NOTES
Hospital Medicine Daily Progress Note    Date of Service  10/6/2018    Chief Complaint  73 y.o. female with history of chronic pain on opiates, thromboembolic disease on warfarin admitted 10/4/2018 with altered mentation elevated INR 8.2, acute renal failure    Hospital Course   She remains obtunded and confused after 2 days, unable to draw labs due to lack of vascular access due to anasarca.  Single IV placed with ultrasound is tenuous.  Renal function improving with hydration.    Interval Problem Update  Seen with RN, remains anxious and obtunded  Son consented for PICC line  Hopefully can place tomorrow    Consultants/Specialty  None    Code Status  Full  Palliative care consult    Disposition  CBD    Review of Systems  Review of Systems   Unable to perform ROS: Mental status change        Physical Exam  Temp:  [36.5 °C (97.7 °F)-36.8 °C (98.3 °F)] 36.8 °C (98.3 °F)  Pulse:  [] 100  Resp:  [16-18] 18  BP: (129-191)/(69-89) 191/83    Physical Exam   Constitutional: She appears well-developed. No distress.   Somewhat thin and chronically ill-appearing    Ketotic breath   HENT:   Head: Normocephalic and atraumatic.   Right Ear: External ear normal.   Left Ear: External ear normal.   Nose: Nose normal.   Mouth/Throat: Oropharynx is clear and moist. No oropharyngeal exudate.   Eyes: Pupils are equal, round, and reactive to light. Conjunctivae and EOM are normal. Right eye exhibits no discharge. Left eye exhibits no discharge. No scleral icterus.   Neck: Neck supple.   Cardiovascular: Normal rate.    Pulmonary/Chest: Effort normal.   Diminished   Abdominal: Soft. She exhibits no distension. There is no tenderness.   Decreased bowel sounds   Musculoskeletal: Normal range of motion. She exhibits edema. She exhibits no tenderness.   Neurological: She is alert.   Perseverating on the TV  Does not answer question  Does not really follow commands well  No focal weakness   Skin: Skin is warm and dry. She is not  diaphoretic. There is pallor.   Psychiatric:   Flat  Inappropriate   Nursing note and vitals reviewed.      Fluids    Intake/Output Summary (Last 24 hours) at 10/06/18 1924  Last data filed at 10/06/18 1800   Gross per 24 hour   Intake             1320 ml   Output             2750 ml   Net            -1430 ml       Laboratory  Recent Labs      10/04/18   2326  10/06/18   1743   WBC  16.9*  8.0   RBC  3.92*  3.15*   HEMOGLOBIN  11.2*  9.0*   HEMATOCRIT  36.3*  27.8*   MCV  92.6  90.5   MCH  28.6  27.9   MCHC  30.9*  30.9*   RDW  51.9*  53.4*   PLATELETCT  376  225   MPV  11.5  11.5     Recent Labs      10/04/18   2326  10/05/18   0829  10/06/18   1743   SODIUM  141  145  153*   POTASSIUM  4.4  4.2  3.8   CHLORIDE  108  118*  127*   CO2  13*  14*  16*   GLUCOSE  150*  124*  151*   BUN  73*  63*  42*   CREATININE  4.73*  3.67*  1.45*   CALCIUM  8.6  7.6*  8.6     Recent Labs      10/04/18   2326   APTT  45.3*   INR  8.20*     Recent Labs      10/04/18   2326   BNPBTYPENAT  830*           Imaging  DX-CHEST-PORTABLE (1 VIEW)   Final Result         1.  No focal infiltrates.   2.  Perihilar interstitial prominence and bronchial wall cuffing, appearance suggests changes of underlying bronchial inflammation, consider bronchitis.   3.  Hyperexpansion of the lungs suggest changes of COPD.      CT-HEAD W/O   Final Result         1.  No acute intracranial abnormality is identified, there are nonspecific white matter changes, commonly associated with small vessel ischemic disease.  Associated mild cerebral atrophy is noted.   2.  Atherosclerosis.      IR-PICC LINE PLACEMENT    (Results Pending)   [id number] VIEW    (Results Pending)        Assessment/Plan  * Acute encephalopathy   Assessment & Plan    Patient with positive opiates-is on chronic pain medication-hold for now  May be constipated due to these causing dehydration, elevated INR, possibly elevated ammonia  Check KUB          Rash   Assessment & Plan    Chronic skin  picking  Likely secondary to opiates        Exhausted vascular access   Assessment & Plan    Patient has tenuous IV access and no ability to obtain phlebotomy  Unable to adjust plan of care  PICC line is ordered  Creatinine has improved and they should be able to place this tomorrow        Acute renal failure superimposed on stage 3 chronic kidney disease (HCC)   Assessment & Plan    Suspect is prerenal  Improving with IV fluids        Normocytic anemia   Assessment & Plan    Likely of chronic disease.  No evidence of bleed.  Monitor.  Transfuse if needed for hemoglobin less than 7 gm/dL          Essential hypertension, benign- (present on admission)   Assessment & Plan    Currently with relative hypotension.  Improved with fluids.  Monitor.  Continue current medication regimen as tolerated        Opioid dependence with delirium (HCC)- (present on admission)   Assessment & Plan    Continue current pain medication regimen.             VTE prophylaxis: INR supratherapeutic on admission unable to obtain follow-up INR, will obtain PICC line to allow for closer monitoring    Patricio- retention  No antibiotic  Unable to tolerate therapy at present

## 2018-10-07 NOTE — PROGRESS NOTES
[facility] Medicine Daily Progress Note    Date of Service  10/7/2018    Chief Complaint  73 y.o. female with history of chronic pain on opiates, thromboembolic disease on warfarin admitted 10/4/2018 with altered mentation elevated INR 8.2, acute renal failure    Hospital Course  Patient remained obtunded her first 2 days in the hospital.  With IV fluids her renal function improved, her IV access was tenuous due to anasarca.  EJ attempts were unsuccessful, PICC was unsuccessful , by day 3 her mentation was improving and she no longer had leukocytosis so risk-benefit of invasive central line versus tunneled PICC were not felt to be reasonable, palliative care met with the patient and talked with her as well as with the son over the phone, they may be contemplating hospice.  CODE STATUS changed to DNR DNI     Interval Problem Update  Patient is much more lucid and appropriate but she cannot tell me the month or the year, she is able to have reasonable discussions regarding her care.  Discussed with RN-patient's son had brought a bag of medications to the patient last night which the nurse found in the bed today the morphine bottle was empty the patient has no recollection of whether she took the medication or not. .  Palliative care met with patient and she is now DNR.  Plan meeting for tomorrow to discuss hospice     Consultants/Specialty  None    Code Status  DNR/DNI    Disposition  CBD    Review of Systems  Review of Systems   Unable to perform ROS: Mental status change   Constitutional: Negative for chills and fever.   Respiratory: Negative for cough, shortness of breath and wheezing.    Cardiovascular: Positive for leg swelling. Negative for chest pain.   Gastrointestinal: Negative for abdominal pain, constipation, diarrhea, nausea and vomiting.   Genitourinary: Negative for dysuria.        Patricio- discussed trial removal   Musculoskeletal:        Does not seem to be in any acute pain when seen   Skin: Positive for  rash (improved). Negative for itching.   Neurological: Positive for weakness. Negative for dizziness and headaches.        Still a bit confused   Psychiatric/Behavioral: Negative for depression. The patient is not nervous/anxious.         Physical Exam  Temp:  [36.2 °C (97.1 °F)-36.3 °C (97.3 °F)] 36.3 °C (97.3 °F)  Pulse:  [66-93] 68  Resp:  [16-18] 18  BP: (134-178)/(74-89) 163/83    Physical Exam   Constitutional: She appears well-developed. No distress.   Somewhat thin and chronically ill-appearing    Ketotic breath- no change   HENT:   Head: Normocephalic and atraumatic.   Right Ear: External ear normal.   Left Ear: External ear normal.   Nose: Nose normal.   Mouth/Throat: Oropharynx is clear and moist. No oropharyngeal exudate.   Eyes: Pupils are equal, round, and reactive to light. Conjunctivae and EOM are normal. Right eye exhibits no discharge. Left eye exhibits no discharge. No scleral icterus.   Neck: Neck supple.   Cardiovascular: Normal rate.    Pulmonary/Chest: Effort normal.   Diminished   Abdominal: Soft. She exhibits no distension. There is no tenderness.   Decreased bowel sounds   Musculoskeletal: Normal range of motion. She exhibits edema (diffuse anasarca). She exhibits no tenderness.   Neurological: She is alert.   Oriented self, place, situation, knows its fall   Skin: Skin is warm and dry. She is not diaphoretic. There is pallor.   Less lesions   Psychiatric: She has a normal mood and affect.   Nursing note and vitals reviewed.      Fluids    Intake/Output Summary (Last 24 hours) at 10/07/18 1640  Last data filed at 10/07/18 0700   Gross per 24 hour   Intake             1440 ml   Output             2300 ml   Net             -860 ml       Laboratory  Recent Labs      10/04/18   2326  10/06/18   1743   WBC  16.9*  8.0   RBC  3.92*  3.15*   HEMOGLOBIN  11.2*  9.0*   HEMATOCRIT  36.3*  27.8*   MCV  92.6  90.5   MCH  28.6  27.9   MCHC  30.9*  30.9*   RDW  51.9*  53.4*   PLATELETCT  376  225   MPV   11.5  11.5     Recent Labs      10/05/18   0829  10/06/18   1743  10/07/18   0437   SODIUM  145  153*  154*   POTASSIUM  4.2  3.8  3.5*   CHLORIDE  118*  127*  129*   CO2  14*  16*  18*   GLUCOSE  124*  151*  111*   BUN  63*  42*  31*   CREATININE  3.67*  1.45*  1.11   CALCIUM  7.6*  8.6  8.6     Recent Labs      10/04/18   2326  10/07/18   0437   APTT  45.3*   --    INR  8.20*  1.20*     Recent Labs      10/04/18   2326   BNPBTYPENAT  830*           Imaging  IR-PICC LINE PLACEMENT   Final Result                  Ultrasound-guided PICC placement performed by qualified nursing staff as    above.          [id number] VIEW   Final Result      Moderate amount of colonic stool.      DX-CHEST-PORTABLE (1 VIEW)   Final Result         1.  No focal infiltrates.   2.  Perihilar interstitial prominence and bronchial wall cuffing, appearance suggests changes of underlying bronchial inflammation, consider bronchitis.   3.  Hyperexpansion of the lungs suggest changes of COPD.      CT-HEAD W/O   Final Result         1.  No acute intracranial abnormality is identified, there are nonspecific white matter changes, commonly associated with small vessel ischemic disease.  Associated mild cerebral atrophy is noted.   2.  Atherosclerosis.           Assessment/Plan  * Acute encephalopathy   Assessment & Plan    KUB with stool start scheduled MiraLAX            Hypernatremia   Assessment & Plan    Patient appears to be third spacing we will place her on half normal saline and reevaluate tomorrow        Anemia, chronic disease   Assessment & Plan    Stable, drop in hemoglobin is delusional after IV fluids         Hypophosphatemia   Assessment & Plan    Replace orally as IV access tenuous        Rash   Assessment & Plan    Chronic skin picking  Likely secondary to opiates  Improving while patient was obtunded but now she is awake        Exhausted vascular access   Assessment & Plan    Patient's encephalopathy is much improved she has no  leukocytosis or fever or signs of infection  Probably does not need prolonged IV access  PICC line unsuccessful  Due to anticipated length of need I do not feel central line or tunneled PICC are appropriate  Ultrasound-guided IV was established and patient's abdominal wall this afternoon        Acute renal failure superimposed on stage 3 chronic kidney disease (HCC)   Assessment & Plan    Suspect was prerenal  Improving with fluids        Normocytic anemia   Assessment & Plan    Likely of chronic disease.  No evidence of bleed.  Monitor.  Transfuse if needed for hemoglobin less than 7 gm/dL          Essential hypertension, benign- (present on admission)   Assessment & Plan    Hypertensive this morning-hydralazine given orally as she had no access will continue as needed oral as needed          Opioid dependence with delirium (HCC)- (present on admission)   Assessment & Plan    Patient was found with a bag of her home medications and narcotics in the bed today  Stated her son had brought them in  The morphine bottle was empty but she could not recall whether she took any of this  Patient was seen by palliative care today, much of patient's ability appears to be related to her chronic pain issues.  As a result she has severe muscle wasting, malnutrition, failure to thrive   She and her son are contemplating hospice             VTE prophylaxis:   Trial horn out  No antibiotic  Unable to tolerate therapy at present  Warfarin per pharmacy

## 2018-10-07 NOTE — PROGRESS NOTES
Assumed care of patient, received bedside report from NOC RN, Pt A&Ox3, to person, time, and place. On 2L of O2 via NC no SOB or distress noted.  Call light within reach, personal belongings within reach.  Bed is locked and in lowest position, Bed/Chair strip alarm on. Tele monitor on. Hourly rounding in place.

## 2018-10-07 NOTE — ASSESSMENT & PLAN NOTE
Patient states this is never happened on prior admission  It is likely due to her diffuse anasarca  IV obtained yesterday and abdominal wall is still patent-continuing fluids

## 2018-10-07 NOTE — PROGRESS NOTES
"Attempted placement of PICC line on right arm but was unsuccessful at gaining access to patient's vein and guidewire did not advance.  Although lidocaine was used to minimize procedure related pain, patient continued to report pain throughout the attempt and eventually stated, \"that's it I can't take anymore, I'm done.\"  Attempt at placing PICC line was aborted despite education.  Updated primary RN and charge RN on unsuccessful attempt and suggested alternative options for IV access including external jugular IV placement, tunneled line placement done by IR, or a CVC.  Exam ended.  "

## 2018-10-07 NOTE — PROGRESS NOTES
Dx abd with moderate stool in colon, dulcolax suppository administered prn constipation/discomfort. Pt with medium, formed, brown BM via bedpan post administration. Will continue to monitor.

## 2018-10-07 NOTE — CARE PLAN
Problem: Skin Integrity  Goal: Risk for impaired skin integrity will decrease  Outcome: PROGRESSING AS EXPECTED  Skin assessment complete, skin impairment documented, and skin breakdown prevention measures in place.       Problem: Pain Management  Goal: Pain level will decrease to patient's comfort goal  Outcome: PROGRESSING AS EXPECTED  Pt receiving prn pain medication as ordered per MD. Pt educated on the use of distractions and repositioning often to decrease pain.

## 2018-10-07 NOTE — PROGRESS NOTES
Patricio catheter removed per MD. Pt educated to call when having to use BR Patricio bag emptied and output documented.Pt tolerated well will monitor for retention.

## 2018-10-07 NOTE — PROGRESS NOTES
Inpatient Anticoagulation Service Note    Date: 10/7/2018  Reason for Anticoagulation: Pulmonary Embolism, Other (Comments) (Protein C deficiency, protein S deficiency)        Hemoglobin Value: (!) 9 (Results confirmed by repeat testing.)  Hematocrit Value: (!) 27.8 (Results confirmed by repeat testing.)  Lab Platelet Value: 225  Target INR: 2.0 to 3.0    INR from last 7 days     Date/Time INR Value    10/07/18 0437 (!)  1.2    10/04/18 2326 (!)  8.2        Dose from last 7 days     Date/Time Dose (mg)    10/07/18 1500  6    10/06/18 1300  0    10/05/18 0800  0        Average Dose (mg):  (unknown (pt altered); [facility] (2017) - 9mg SuTT & 6mg AOD )  Significant Interactions: Proton Pump Inhibitor, Thyroid Medications, Other (Comments) (buproprion)  Bridge Therapy: No     Reversal Agent Administered: Vitamin K  Intravenous (10 mg given 10/5 AM)  Comments: INR subtherapeutic and no indication of active bleeding - restart warfarin. Still unable to interview pt about home dose; per [facility] in 2017 - last dose noted was 9mg on Sun/Tue/Thur and 6mg AOD. Given recent supratherapeutic INR value will conservatively start 6mg daily - expect a slow rise in INR given previously administered VitK. NNL to assess h/h but yesterday's labs dropped slightly but likely dilutional. New DDI noted with bupropion - appears med is being held d/t inability to crush.       Plan:  Warfarin 6mg with INR check tomorrow  Education Material Provided?: No (Warfarin PTA)  Pharmacist suggested discharge dosing: Warfarin 6mg PO daily with close f/u within 3 days       Leeann Chi, PharmD., BCPS

## 2018-10-07 NOTE — CONSULTS
"Reason for PC Consult: Advance Care Planning    Consulted by: Dr Mayen    Assessment:  General: 73 yr old female admitted on 10/4/18 for Acute kidney injury. Pt has hx of Anemia, Atrial tachycardia-paroxysmal, Cancer-left breast, Cardiomyopathy, COPD, Cholelithiasis, Gastritis, HTN, Hypothyroidism,. Inferior vena caval thrombosis, LBBB, Lumbar disc disease, Osteoarthritis, Protein C deficiency, Protein S deficiency, PE, Pulmonary hypertension, Renal atrophy-right, Rheumatoid arthritis, IVC filter, Seizure, Sleep apnea, CHF-systolic and diastolic, Chronic TMJ, EF of 45%.  Pt lives with son, Alexander who is her DPOA. Pt was found down with ALOC when pts son returned home after work. Pts son called EMS.       Dyspnea: No, currently denies, 99% on 1.5 liters NC  Last BM:  10/7  Pain: No, currently denies  Depression: Mood appropriate for situation    Dementia: No       Spiritual:  Is Jainism or spirituality important for coping with this illness? Yes   Has a  or spiritual provider visit been requested? Yes    Palliative Performance Scale: 50%    Advance Directive: Advance Directive on File.   DPOA: Yes, Alexander Sanford [phone], son  POLST: No      Code Status: DNR/DNI    Outcome:  Met with the pt at the bedside. Introduced self and the role of Palliative care/support. Spoke about pts current admission. Pt does not remember circumstances but remembers being told that she was confused and her son had her brought in. Spoke about pts medical hx. Pt stated that she had several GLF approx 3 years prior and stated \"I know I hit my head on something really hard, more than once, I wonder if that's part of this...\". Pt currently on dysphagia 1 NTL diet, pt denies ever having any swallowing difficulties. Spoke about pts support with her sons. Pts other son, Kehinde, also lives in [place] and assists with her needs but both sons currently work. Pt states that she used to enjoy going to [employer] but her friend who used to drive " "her has moved away, pt stated that she would enjoy a visit from the . Pt denies seeing a Pain Specialist and states that her PCP prescribes her pain medications. Pt states that she \"I just had a bunch of things go bad one after another, and I just hurt now\".     Discussed code status. Explained Full code, CPR, Intubation vs DNR/DNI. Pt stated that she would not wish \"for all that, it just sounds awful. If I'm gonna go, it's OK\". Let pt know that I would update the MD.  Pt stated that she knows she is getting weaker but wants to remain at home. Pt stated \"My Doctor wants me to go somewhere, you know, like a place. I don't have the money for that, and I don't want to go, I just want to stay in my little apartment. I just want to be able to die there, not anywhere else\". Asked the pt if she had thought about hospice as a possible option for more support at home. Pt stated that she was not sure. Spoke about how the family utilized hospice when her mother passed away and that it was a lot of support and she was able to die at home. Pt concerned that she cannot afford a  and that her sons would not be able to either. Discussed that [place] is [place] assistance when needed and requirements are met. Pt stated that she has not spoken with her son on her end of life wishes as much as she would like,\"He doesn't want to talk about those things\". Offered to arrange a time to meet with the pt and son to facilitate this discussion, pt stated that she would like this. Provided the pt with support and empathy throughout conversation. Provided pt with contact number for any questions.     Called and spoke with pts son, Alexander. Time arranged for 16:00 tomorrow 10/8 to meet with his mother and discuss Hospice and GOC.     Updated: ARIS MON RN    Plan: Meet with pt and son, Alexander, at the bedside at 16:00 on 10/8/18, for further discussion on Hospice and pts POC goals.     Recommendations: I do not recommend an ethics or hospice " consult at this time because as pt would like to discuss with PC RN and son tomorrow before making decision. .    Thank you for allowing Palliative Care to participate in this patient's care. Please feel free to call [phone] with any questions or concerns.

## 2018-10-07 NOTE — PROGRESS NOTES
IV infiltrate noted to RUE. IV d/c'd, catheter tip intact, compression dressing and ice applied. RUE elevated on pillow. + R radial pulse, sensation intact, states discomfort with palpitation. Writer unable to obtain IV access at this time r/t poor vasculature; much difficulty with several attempts obtaining labs this am. Care assumed by day team nurses, pt for picc line placement.

## 2018-10-08 PROBLEM — R79.1 SUPRATHERAPEUTIC INR: Status: ACTIVE | Noted: 2018-10-08

## 2018-10-08 LAB
AMMONIA PLAS-SCNC: 59 UMOL/L (ref 11–45)
ANION GAP SERPL CALC-SCNC: 5 MMOL/L (ref 0–11.9)
BUN SERPL-MCNC: 19 MG/DL (ref 8–22)
CALCIUM SERPL-MCNC: 8.2 MG/DL (ref 8.5–10.5)
CHLORIDE SERPL-SCNC: 120 MMOL/L (ref 96–112)
CO2 SERPL-SCNC: 20 MMOL/L (ref 20–33)
CREAT SERPL-MCNC: 0.87 MG/DL (ref 0.5–1.4)
GLUCOSE SERPL-MCNC: 103 MG/DL (ref 65–99)
INR PPP: 1.23 (ref 0.87–1.13)
POTASSIUM SERPL-SCNC: 3.3 MMOL/L (ref 3.6–5.5)
PROTHROMBIN TIME: 15.6 SEC (ref 12–14.6)
SODIUM SERPL-SCNC: 145 MMOL/L (ref 135–145)

## 2018-10-08 PROCEDURE — 700102 HCHG RX REV CODE 250 W/ 637 OVERRIDE(OP): Performed by: INTERNAL MEDICINE

## 2018-10-08 PROCEDURE — 36415 COLL VENOUS BLD VENIPUNCTURE: CPT

## 2018-10-08 PROCEDURE — 80048 BASIC METABOLIC PNL TOTAL CA: CPT

## 2018-10-08 PROCEDURE — A9270 NON-COVERED ITEM OR SERVICE: HCPCS | Performed by: HOSPITALIST

## 2018-10-08 PROCEDURE — A9270 NON-COVERED ITEM OR SERVICE: HCPCS | Performed by: INTERNAL MEDICINE

## 2018-10-08 PROCEDURE — 700101 HCHG RX REV CODE 250: Performed by: INTERNAL MEDICINE

## 2018-10-08 PROCEDURE — 302112 WASHCLOTH,PERINEAL CARE: Performed by: INTERNAL MEDICINE

## 2018-10-08 PROCEDURE — 92526 ORAL FUNCTION THERAPY: CPT

## 2018-10-08 PROCEDURE — 85610 PROTHROMBIN TIME: CPT

## 2018-10-08 PROCEDURE — 82140 ASSAY OF AMMONIA: CPT

## 2018-10-08 PROCEDURE — 770020 HCHG ROOM/CARE - TELE (206)

## 2018-10-08 PROCEDURE — 99233 SBSQ HOSP IP/OBS HIGH 50: CPT | Performed by: INTERNAL MEDICINE

## 2018-10-08 PROCEDURE — 700102 HCHG RX REV CODE 250 W/ 637 OVERRIDE(OP): Performed by: HOSPITALIST

## 2018-10-08 RX ORDER — BISOPROLOL FUMARATE 5 MG/1
5 TABLET, FILM COATED ORAL DAILY
Status: DISCONTINUED | OUTPATIENT
Start: 2018-10-09 | End: 2018-10-09 | Stop reason: HOSPADM

## 2018-10-08 RX ADMIN — OXYCODONE HYDROCHLORIDE AND ACETAMINOPHEN 1 TABLET: 10; 325 TABLET ORAL at 11:06

## 2018-10-08 RX ADMIN — DIBASIC SODIUM PHOSPHATE, MONOBASIC POTASSIUM PHOSPHATE AND MONOBASIC SODIUM PHOSPHATE 1 TABLET: 852; 155; 130 TABLET ORAL at 04:43

## 2018-10-08 RX ADMIN — LISINOPRIL 5 MG: 5 TABLET ORAL at 04:43

## 2018-10-08 RX ADMIN — OXYCODONE HYDROCHLORIDE AND ACETAMINOPHEN 1 TABLET: 10; 325 TABLET ORAL at 17:16

## 2018-10-08 RX ADMIN — POTASSIUM CHLORIDE AND SODIUM CHLORIDE: 450; 150 INJECTION, SOLUTION INTRAVENOUS at 02:58

## 2018-10-08 RX ADMIN — DIBASIC SODIUM PHOSPHATE, MONOBASIC POTASSIUM PHOSPHATE AND MONOBASIC SODIUM PHOSPHATE 1 TABLET: 852; 155; 130 TABLET ORAL at 11:07

## 2018-10-08 RX ADMIN — BUDESONIDE AND FORMOTEROL FUMARATE DIHYDRATE 2 PUFF: 160; 4.5 AEROSOL RESPIRATORY (INHALATION) at 09:32

## 2018-10-08 RX ADMIN — DIBASIC SODIUM PHOSPHATE, MONOBASIC POTASSIUM PHOSPHATE AND MONOBASIC SODIUM PHOSPHATE 1 TABLET: 852; 155; 130 TABLET ORAL at 00:00

## 2018-10-08 RX ADMIN — LISINOPRIL 5 MG: 5 TABLET ORAL at 17:16

## 2018-10-08 RX ADMIN — HYDRALAZINE HYDROCHLORIDE 25 MG: 25 TABLET, FILM COATED ORAL at 15:57

## 2018-10-08 RX ADMIN — BISOPROLOL FUMARATE 2.5 MG: 5 TABLET ORAL at 04:47

## 2018-10-08 RX ADMIN — LEVOTHYROXINE SODIUM 125 MCG: 125 TABLET ORAL at 04:43

## 2018-10-08 RX ADMIN — BUDESONIDE AND FORMOTEROL FUMARATE DIHYDRATE 2 PUFF: 160; 4.5 AEROSOL RESPIRATORY (INHALATION) at 20:31

## 2018-10-08 RX ADMIN — OMEPRAZOLE 20 MG: 20 CAPSULE, DELAYED RELEASE ORAL at 04:43

## 2018-10-08 RX ADMIN — ACETAMINOPHEN 650 MG: 325 TABLET, FILM COATED ORAL at 20:31

## 2018-10-08 RX ADMIN — POTASSIUM CHLORIDE AND SODIUM CHLORIDE: 450; 150 INJECTION, SOLUTION INTRAVENOUS at 11:06

## 2018-10-08 RX ADMIN — DIBASIC SODIUM PHOSPHATE, MONOBASIC POTASSIUM PHOSPHATE AND MONOBASIC SODIUM PHOSPHATE 1 TABLET: 852; 155; 130 TABLET ORAL at 17:16

## 2018-10-08 RX ADMIN — WARFARIN SODIUM 6 MG: 6 TABLET ORAL at 17:16

## 2018-10-08 ASSESSMENT — ENCOUNTER SYMPTOMS
CHILLS: 0
NAUSEA: 0
WEAKNESS: 1
DIARRHEA: 0
CONSTIPATION: 0
COUGH: 0
WHEEZING: 0
VOMITING: 0
ABDOMINAL PAIN: 0
DIZZINESS: 0
SHORTNESS OF BREATH: 0
NERVOUS/ANXIOUS: 0
HEADACHES: 0
DEPRESSION: 0
FEVER: 0

## 2018-10-08 ASSESSMENT — PAIN SCALES - GENERAL
PAINLEVEL_OUTOF10: 7
PAINLEVEL_OUTOF10: 0
PAINLEVEL_OUTOF10: 6
PAINLEVEL_OUTOF10: 7
PAINLEVEL_OUTOF10: 0
PAINLEVEL_OUTOF10: 7
PAINLEVEL_OUTOF10: 7

## 2018-10-08 NOTE — PROGRESS NOTES
Bedside report received. Pt care assumed. Pt resting comfortably. Pt not in distress. AOx 3, disoriented to year but anxious. No complaints at this time. Safety precautions in place. Educated to call for assistance.

## 2018-10-08 NOTE — PALLIATIVE CARE
"Palliative Care follow-up  Met with pt and son, Alexander, pts DPOA. Spoke about pts current admit. Discussed pts AD and her wishes for care. Spoke about the option of DC to a SNF with therapies with the goal of increasing strength and return to home. Spoke about the goal of comfort and what a DC to home with Hospice support would be. Spoke about quality of life and the goal of not returning to the acute care setting. Acknowledging that we have a disease process that cannot be \"fixed\" and that the choice of comfort and quality of life is more important than quantity. Pt and son in agreement that hospice aligns with the pt wishes and would like to have a referral placed. Pt and son opted for [facility].   Discussed code status with pt and son. Again spoke about CPR, Intubation, vs DNR and DNI. Pt again verified that she wishes to be DNR/DNI. POLST completed, signed and will be scanned into EMR.     Choice form faxed to [facility] for [facility] to fax#[phone].     Updated: MD, BS RN    Plan: Pt to meet with [facility] to discuss potential DC to home with hospice. PC RN will continue to remain available for pt to assist with POC.     Thank you for allowing Palliative Care to participate in this patient's care. Please feel free to call [phone] with any questions or concerns.  "

## 2018-10-08 NOTE — DISCHARGE PLANNING
Anticipated Discharge Disposition: home with hospice     Action: palliative care has planned meeting at 4pm today Monday 10/8    Barriers to Discharge: hospice choice, acceptance     Plan: pallative care meeting at 4pm with Pt and Pt's family

## 2018-10-08 NOTE — THERAPY
"Speech Language Therapy dysphagia treatment completed.   Functional Status:  Pt seen on this date for dysphagia treatment. Pt A&Ox3 with disorientation to date. Pt seen with dysphagia I/NTL breakfast tray and throat clear X1 noted, however, no other overt s/sx of aspiration. Upon palpation, laryngeal elevation noted to be weak. Extensive education provided to pt regarding importance of sitting up at 90* and current diet recommendation, however, question pt's understanding of information as she asked multiple times for HOB to be lowered while eating and asked why she was on current dysphagia I/NTL diet intermittently through out session. Would recommend that diet and nutrition follow up with pt regarding food preferences as pt did not enjoy most of meal today stating it was \"too sweet\" and pt only consuming ~50% of meal. At this time, would recommend that pt continue with dysphagia I/NTL diet with use of compensatory swallowing strategies (1:1 direct supervision during meal times, small bites/sips, TV off during meals to minimize distractions.) Dysphagia education provided to pt and she verbalized understanding. SLP to follow.    Recommendations: continuation of dysphagia I/NTL diet, recommend diet and nutrition follow up with pt regarding PO preferences   Plan of Care: Will benefit from Speech Therapy 3 times per week  Post-Acute Therapy: Discharge to a transitional care facility for continued skilled therapy services.    See \"Rehab Therapy-Acute\" Patient Summary Report for complete documentation.     "

## 2018-10-08 NOTE — PROGRESS NOTES
Inpatient Anticoagulation Service Note    Date: 10/8/2018  Reason for Anticoagulation: Pulmonary Embolism, Other (Comments) (Protein C deficiency, protein S deficiency)        Hemoglobin Value: (!) 9 (Results confirmed by repeat testing.)  Hematocrit Value: (!) 27.8 (Results confirmed by repeat testing.)  Lab Platelet Value: 225  Target INR: 2.0 to 3.0    INR from last 7 days     Date/Time INR Value    10/08/18 0136 (!)  1.23    10/07/18 0437 (!)  1.2    10/04/18 2326 (!)  8.2        Dose from last 7 days     Date/Time Dose (mg)    10/08/18 1617  6    10/07/18 1500  6    10/06/18 1300  0    10/05/18 0800  0        Average Dose (mg):  (unknown (pt altered); [facility] (2017) - 9mg SuTT & 6mg AOD )  Significant Interactions: Proton Pump Inhibitor, Thyroid Medications, Other (buproprion)  Bridge Therapy: No     Reversal Agent Administered: Vitamin K  Intravenous (10 mg given 10/5 AM)    Comments: No bleeding noted in the chart. No new H/H today. Patient/family is considering palliative care/hospice.    Plan: Will continue with warfarin 6 mg daily   Education Material Provided?: No  Pharmacist suggested discharge dosing: warfarin 6 mg daily and an INR within 72 hours of discharge     Gilda Jaffe, PharmD, BCPS      Addendum:  I agree with the above plan.    Leeann Chi, MesfinD., BCPS

## 2018-10-08 NOTE — PROGRESS NOTES
Pt educated regarding need to avoid leafy green vegetables and other foods with vitamin K. Pt stated she was unaware of correlation between those foods and her coumadin. Pt would like dietary consult.

## 2018-10-08 NOTE — PROGRESS NOTES
[facility] Medicine Daily Progress Note    Date of Service  10/8/2018    Chief Complaint  73 y.o. female with history of chronic pain on opiates, thromboembolic disease on warfarin admitted 10/4/2018 with altered mentation elevated INR 8.2, acute renal failure    Hospital Course  Patient remained obtunded her first 2 days in the hospital.  With IV fluids her renal function improved, her IV access was tenuous due to anasarca.  EJ attempts were unsuccessful, PICC was unsuccessful , by day 3 her mentation was improving and she no longer had leukocytosis so risk-benefit of invasive central line versus tunneled PICC were not felt to be reasonable, palliative care met with the patient and talked with her as well as with the son over the phone, they may be contemplating hospice.  CODE STATUS changed to DNR DNI     Interval Problem Update  Patient is much more lucid and appropriate but she cannot tell me the month or the year, she is able to have reasonable discussions regarding her care.  Discussed with RN-patient's son had brought a bag of medications to the patient last night which the nurse found in the bed today the morphine bottle was empty the patient has no recollection of whether she took the medication or not. .  Palliative care met with patient and she is now DNR.  Plan meeting for tomorrow to discuss hospice     Consultants/Specialty  None    Code Status  DNR/DNI    Disposition  CBD    Review of Systems  Review of Systems   Unable to perform ROS: Mental status change   Constitutional: Negative for chills and fever.   Respiratory: Negative for cough, shortness of breath and wheezing.    Cardiovascular: Positive for leg swelling (Seems to be baseline). Negative for chest pain.   Gastrointestinal: Negative for abdominal pain, constipation, diarrhea, nausea and vomiting.        Frequent BMs without overt diarrhea   Genitourinary: Negative for dysuria.        Patricio removed   Musculoskeletal:        Denies pain   Skin:  Positive for rash (improved). Negative for itching.   Neurological: Positive for weakness (Improving). Negative for dizziness and headaches.   Psychiatric/Behavioral: Negative for depression. The patient is not nervous/anxious.         Physical Exam  Temp:  [35.9 °C (96.7 °F)-36.9 °C (98.4 °F)] 36.9 °C (98.4 °F)  Pulse:  [67-78] 67  Resp:  [18] 18  BP: (150-169)/(75-88) 152/78    Physical Exam   Constitutional: She is oriented to person, place, and time. She appears well-developed. No distress.   Somewhat thin and chronically ill-appearing    More alert and animated more appropriate   HENT:   Head: Normocephalic and atraumatic.   Right Ear: External ear normal.   Left Ear: External ear normal.   Nose: Nose normal.   Mouth/Throat: Oropharynx is clear and moist. No oropharyngeal exudate.   Eyes: Pupils are equal, round, and reactive to light. Conjunctivae and EOM are normal. Right eye exhibits no discharge. Left eye exhibits no discharge. No scleral icterus.   Neck: Neck supple.   Cardiovascular: Normal rate.    Pulmonary/Chest: Effort normal.   Diminished   Abdominal: Soft. Bowel sounds are normal. She exhibits no distension. There is no tenderness.   Musculoskeletal: Normal range of motion. She exhibits edema (diffuse anasarca essentially unchanged). She exhibits no tenderness.   Neurological: She is alert and oriented to person, place, and time.   Skin: Skin is warm and dry. She is not diaphoretic. There is pallor.   Rash continues to improve   Psychiatric: She has a normal mood and affect.   Nursing note and vitals reviewed.      Fluids    Intake/Output Summary (Last 24 hours) at 10/08/18 1638  Last data filed at 10/08/18 1130   Gross per 24 hour   Intake                0 ml   Output              500 ml   Net             -500 ml       Laboratory  Recent Labs      10/06/18   1743   WBC  8.0   RBC  3.15*   HEMOGLOBIN  9.0*   HEMATOCRIT  27.8*   MCV  90.5   MCH  27.9   MCHC  30.9*   RDW  53.4*   PLATELETCT  225   MPV   11.5     Recent Labs      10/06/18   1743  10/07/18   0437  10/08/18   0136   SODIUM  153*  154*  145   POTASSIUM  3.8  3.5*  3.3*   CHLORIDE  127*  129*  120*   CO2  16*  18*  20   GLUCOSE  151*  111*  103*   BUN  42*  31*  19   CREATININE  1.45*  1.11  0.87   CALCIUM  8.6  8.6  8.2*     Recent Labs      10/07/18   0437  10/08/18   0136   INR  1.20*  1.23*               Imaging  IR-PICC LINE PLACEMENT   Final Result                  Ultrasound-guided PICC placement performed by qualified nursing staff as    above.          [id number] VIEW   Final Result      Moderate amount of colonic stool.      DX-CHEST-PORTABLE (1 VIEW)   Final Result         1.  No focal infiltrates.   2.  Perihilar interstitial prominence and bronchial wall cuffing, appearance suggests changes of underlying bronchial inflammation, consider bronchitis.   3.  Hyperexpansion of the lungs suggest changes of COPD.      CT-HEAD W/O   Final Result         1.  No acute intracranial abnormality is identified, there are nonspecific white matter changes, commonly associated with small vessel ischemic disease.  Associated mild cerebral atrophy is noted.   2.  Atherosclerosis.           Assessment/Plan  * Acute encephalopathy   Assessment & Plan    Multiple BMs  Will decrease bowel protocol          Supratherapeutic INR   Assessment & Plan    Likely due to poor oral intake and severe volume depletion  Resolved  Pharmacy resumed Coumadin which the patient takes for thromboembolic disease.        Hypernatremia   Assessment & Plan    On hypotonic saline a.m. labs        Anemia, chronic disease   Assessment & Plan    Drop was due to dilution from IV fluids        Hypophosphatemia   Assessment & Plan    Replace orally as IV access tenuous  Recheck in a.m.        Rash   Assessment & Plan    Chronic skin picking  Likely secondary to opiates  Improving while patient has been in hospital        Exhausted vascular access   Assessment & Plan    Patient states this  is never happened on prior admission  It is likely due to her diffuse anasarca  IV obtained yesterday and abdominal wall is still patent-continuing fluids        Acute renal failure superimposed on stage 3 chronic kidney disease (HCC)   Assessment & Plan    Suspect was prerenal  Improving with fluids  A.m. labs to follow-up        Normocytic anemia   Assessment & Plan    Likely of chronic disease.  No evidence of bleed.  Monitor.  Transfuse if needed for hemoglobin less than 7 gm/dL          Essential hypertension, benign- (present on admission)   Assessment & Plan    Still outside range, Zebeta increased          Opioid dependence with delirium (HCC)- (present on admission)   Assessment & Plan    Delirium resolved and she is denying pain presently  She is significantly debilitated due to her chronic pain  Considering hospice-family meeting this afternoon             VTE prophylaxis:   Patricio discontinued  No antibiotic  Unable to tolerate therapy at present  Warfarin per pharmacy

## 2018-10-08 NOTE — ASSESSMENT & PLAN NOTE
Likely due to poor oral intake and severe volume depletion  Resolved  Pharmacy resumed Coumadin which the patient takes for thromboembolic disease.

## 2018-10-08 NOTE — PROGRESS NOTES
Bedside report received, pt care assumed. Pt is alert, watching television in bed, no acute complaints. Bed alarm for safety. Will continue to monitor

## 2018-10-09 VITALS
HEART RATE: 76 BPM | HEIGHT: 64 IN | RESPIRATION RATE: 12 BRPM | WEIGHT: 156.75 LBS | BODY MASS INDEX: 26.76 KG/M2 | DIASTOLIC BLOOD PRESSURE: 82 MMHG | SYSTOLIC BLOOD PRESSURE: 167 MMHG | TEMPERATURE: 97.7 F | OXYGEN SATURATION: 100 %

## 2018-10-09 LAB
ALBUMIN SERPL BCP-MCNC: 3.3 G/DL (ref 3.2–4.9)
ALBUMIN/GLOB SERPL: 1.3 G/DL
ALP SERPL-CCNC: 43 U/L (ref 30–99)
ALT SERPL-CCNC: 19 U/L (ref 2–50)
ANION GAP SERPL CALC-SCNC: 9 MMOL/L (ref 0–11.9)
AST SERPL-CCNC: 19 U/L (ref 12–45)
BASOPHILS # BLD AUTO: 0.5 % (ref 0–1.8)
BASOPHILS # BLD: 0.04 K/UL (ref 0–0.12)
BILIRUB SERPL-MCNC: 0.4 MG/DL (ref 0.1–1.5)
BUN SERPL-MCNC: 12 MG/DL (ref 8–22)
CALCIUM SERPL-MCNC: 8.6 MG/DL (ref 8.5–10.5)
CHLORIDE SERPL-SCNC: 117 MMOL/L (ref 96–112)
CO2 SERPL-SCNC: 19 MMOL/L (ref 20–33)
CREAT SERPL-MCNC: 0.88 MG/DL (ref 0.5–1.4)
EOSINOPHIL # BLD AUTO: 0.6 K/UL (ref 0–0.51)
EOSINOPHIL NFR BLD: 7.1 % (ref 0–6.9)
ERYTHROCYTE [DISTWIDTH] IN BLOOD BY AUTOMATED COUNT: 51.1 FL (ref 35.9–50)
GLOBULIN SER CALC-MCNC: 2.5 G/DL (ref 1.9–3.5)
GLUCOSE SERPL-MCNC: 90 MG/DL (ref 65–99)
HCT VFR BLD AUTO: 30.4 % (ref 37–47)
HGB BLD-MCNC: 9.7 G/DL (ref 12–16)
IMM GRANULOCYTES # BLD AUTO: 0.08 K/UL (ref 0–0.11)
IMM GRANULOCYTES NFR BLD AUTO: 0.9 % (ref 0–0.9)
INR PPP: 1.22 (ref 0.87–1.13)
LYMPHOCYTES # BLD AUTO: 1.83 K/UL (ref 1–4.8)
LYMPHOCYTES NFR BLD: 21.6 % (ref 22–41)
MCH RBC QN AUTO: 28 PG (ref 27–33)
MCHC RBC AUTO-ENTMCNC: 31.9 G/DL (ref 33.6–35)
MCV RBC AUTO: 87.6 FL (ref 81.4–97.8)
MONOCYTES # BLD AUTO: 0.52 K/UL (ref 0–0.85)
MONOCYTES NFR BLD AUTO: 6.1 % (ref 0–13.4)
NEUTROPHILS # BLD AUTO: 5.41 K/UL (ref 2–7.15)
NEUTROPHILS NFR BLD: 63.8 % (ref 44–72)
NRBC # BLD AUTO: 0 K/UL
NRBC BLD-RTO: 0 /100 WBC
PHOSPHATE SERPL-MCNC: 2.4 MG/DL (ref 2.5–4.5)
PLATELET # BLD AUTO: 260 K/UL (ref 164–446)
PMV BLD AUTO: 11 FL (ref 9–12.9)
POTASSIUM SERPL-SCNC: 3.8 MMOL/L (ref 3.6–5.5)
PROT SERPL-MCNC: 5.8 G/DL (ref 6–8.2)
PROTHROMBIN TIME: 15.5 SEC (ref 12–14.6)
RBC # BLD AUTO: 3.47 M/UL (ref 4.2–5.4)
SODIUM SERPL-SCNC: 145 MMOL/L (ref 135–145)
WBC # BLD AUTO: 8.5 K/UL (ref 4.8–10.8)

## 2018-10-09 PROCEDURE — 97161 PT EVAL LOW COMPLEX 20 MIN: CPT

## 2018-10-09 PROCEDURE — 84100 ASSAY OF PHOSPHORUS: CPT

## 2018-10-09 PROCEDURE — 99239 HOSP IP/OBS DSCHRG MGMT >30: CPT | Performed by: INTERNAL MEDICINE

## 2018-10-09 PROCEDURE — A9270 NON-COVERED ITEM OR SERVICE: HCPCS | Performed by: HOSPITALIST

## 2018-10-09 PROCEDURE — 85610 PROTHROMBIN TIME: CPT

## 2018-10-09 PROCEDURE — A9270 NON-COVERED ITEM OR SERVICE: HCPCS | Performed by: INTERNAL MEDICINE

## 2018-10-09 PROCEDURE — 700102 HCHG RX REV CODE 250 W/ 637 OVERRIDE(OP): Performed by: HOSPITALIST

## 2018-10-09 PROCEDURE — 92526 ORAL FUNCTION THERAPY: CPT

## 2018-10-09 PROCEDURE — 85025 COMPLETE CBC W/AUTO DIFF WBC: CPT

## 2018-10-09 PROCEDURE — G8979 MOBILITY GOAL STATUS: HCPCS | Mod: CI

## 2018-10-09 PROCEDURE — 36415 COLL VENOUS BLD VENIPUNCTURE: CPT

## 2018-10-09 PROCEDURE — 700102 HCHG RX REV CODE 250 W/ 637 OVERRIDE(OP): Performed by: INTERNAL MEDICINE

## 2018-10-09 PROCEDURE — 80053 COMPREHEN METABOLIC PANEL: CPT

## 2018-10-09 PROCEDURE — G8978 MOBILITY CURRENT STATUS: HCPCS | Mod: CJ

## 2018-10-09 RX ADMIN — OXYCODONE HYDROCHLORIDE AND ACETAMINOPHEN 1 TABLET: 10; 325 TABLET ORAL at 07:45

## 2018-10-09 RX ADMIN — LEVOTHYROXINE SODIUM 125 MCG: 125 TABLET ORAL at 05:06

## 2018-10-09 RX ADMIN — DIBASIC SODIUM PHOSPHATE, MONOBASIC POTASSIUM PHOSPHATE AND MONOBASIC SODIUM PHOSPHATE 1 TABLET: 852; 155; 130 TABLET ORAL at 05:06

## 2018-10-09 RX ADMIN — HYDRALAZINE HYDROCHLORIDE 25 MG: 25 TABLET, FILM COATED ORAL at 00:05

## 2018-10-09 RX ADMIN — BUDESONIDE AND FORMOTEROL FUMARATE DIHYDRATE 2 PUFF: 160; 4.5 AEROSOL RESPIRATORY (INHALATION) at 11:33

## 2018-10-09 RX ADMIN — DIBASIC SODIUM PHOSPHATE, MONOBASIC POTASSIUM PHOSPHATE AND MONOBASIC SODIUM PHOSPHATE 1 TABLET: 852; 155; 130 TABLET ORAL at 11:33

## 2018-10-09 RX ADMIN — HYDRALAZINE HYDROCHLORIDE 25 MG: 25 TABLET, FILM COATED ORAL at 08:23

## 2018-10-09 RX ADMIN — LISINOPRIL 5 MG: 5 TABLET ORAL at 05:06

## 2018-10-09 RX ADMIN — DIBASIC SODIUM PHOSPHATE, MONOBASIC POTASSIUM PHOSPHATE AND MONOBASIC SODIUM PHOSPHATE 1 TABLET: 852; 155; 130 TABLET ORAL at 00:05

## 2018-10-09 RX ADMIN — OMEPRAZOLE 20 MG: 20 CAPSULE, DELAYED RELEASE ORAL at 05:06

## 2018-10-09 RX ADMIN — BISOPROLOL FUMARATE 5 MG: 5 TABLET ORAL at 05:06

## 2018-10-09 ASSESSMENT — GAIT ASSESSMENTS
ASSISTIVE DEVICE: FRONT WHEEL WALKER
GAIT LEVEL OF ASSIST: CONTACT GUARD ASSIST
DISTANCE (FEET): 100

## 2018-10-09 ASSESSMENT — PAIN SCALES - GENERAL
PAINLEVEL_OUTOF10: 7
PAINLEVEL_OUTOF10: 5
PAINLEVEL_OUTOF10: 4
PAINLEVEL_OUTOF10: 4

## 2018-10-09 ASSESSMENT — COGNITIVE AND FUNCTIONAL STATUS - GENERAL
SUGGESTED CMS G CODE MODIFIER MOBILITY: CI
MOBILITY SCORE: 23
CLIMB 3 TO 5 STEPS WITH RAILING: A LITTLE

## 2018-10-09 NOTE — DISCHARGE PLANNING
Agency/Facility Name: [facility]   Spoke To: FIOR/Julissa   Outcome: Patient accepted.    RNCM Brooke aware of acceptance.

## 2018-10-09 NOTE — PROGRESS NOTES
Discharge instructions given to patient and son at bedside, verbalizes understanding and states plans for follow-up with PCP for INR check up. [facility] to see patient tomorrow. New and home medication review, post-discharge activity level and worsening of symptoms needing follow-up care discussed. Per pharmacist fozia Chavez for patient to continue with 6 mg Coumadin. Patient has this medication and wishes to take evening medications at at home. Medications picked up from pharmacy and returned to patient. Telemetry monitor removed. All belongings accounted for, all questions answered at this time. Patient escorted via wheelchair with son to car.

## 2018-10-09 NOTE — DISCHARGE PLANNING
Received Choice form at 131  Agency/Facility Name: [facility]  Referral sent per Choice form at 1322

## 2018-10-09 NOTE — PROGRESS NOTES
Received report from nightshift RN, pt in pain 7.10 medicated see MAR. Call light within reach, non slip socks on.

## 2018-10-09 NOTE — THERAPY
SLP Contact Note:  Per RN, holding tube feed for possible lumbar puncture procedure this afternoon. Will follow for PO trials/MBS when pt is appropriate.

## 2018-10-09 NOTE — PROGRESS NOTES
Spoke with Keyur in pharmacy who confirmed home medications are in pharmacy for safekeeping. Med tag in the chart.

## 2018-10-09 NOTE — THERAPY
"Speech Language Therapy dysphagia treatment completed.   Functional Status:  Pt seen on this date for dysphagia treatment. Pt awake and agreeable to therapy. Pt found with thin liquids at bedside, RN notified. Per RN, pt has been tolerating current dysphagia I/NTL diet and expressed no concerns. PO trials of soft solids and thin liquids via cup sip were presented to pt and audible gulp noted with sips of thin liquids, however, no other overt s/sx of aspiration were noted. Pt needed mod-max cues to not talk while eating, however, implementing small bites/sips compensatory strategy with no clinician support. Upon palpation, laryngeal elevation noted to be weak. Pt reporting that she always wears her top denture during meals and typically eats without bottom denture as it is ill-fitting and tends to choose softer foods. Pt reporting no difficulty with chewing soft solids and mastication noted to be appropriate during PO trials. At this time, would recommend that pt be upgraded to dysphagia II/thin liquid diet with 1:1 direct supervision during meal times. Dysphagia education and results and recs were provided to pt and she verbalized understanding. SLP to follow for dysphagia therapy.      Recommendations: upgrade to dysphagia II/thin liquids with 1:1 direct supervision during meals   Plan of Care: Will benefit from Speech Therapy 3 times per week  Post-Acute Therapy: Discharge to a transitional care facility for continued skilled therapy services.    See \"Rehab Therapy-Acute\" Patient Summary Report for complete documentation.     "

## 2018-10-09 NOTE — CARE PLAN
Problem: Safety  Goal: Will remain free from injury  Outcome: PROGRESSING AS EXPECTED  Pt safety precautions in place; bed in lowest lock position, 2 side rails up, non slip socks on, call light within reach- educated on when and how to use call light.  Intervention: Provide assistance with mobility   10/09/18 0326   OTHER   Assistance / Tolerance Assistance of One;Tolerates Well         Problem: Pain Management  Goal: Pain level will decrease to patient's comfort goal   10/09/18 0326   OTHER   Comfort Goal Comfort at Rest;Comfort with Movement;Sleep Comfortably

## 2018-10-09 NOTE — THERAPY
"Physical Therapy Evaluation completed.   Bed Mobility:  Supine to Sit: Supervised  Transfers: Sit to Stand: Stand by Assist  Gait: Level Of Assist: Contact Guard Assist with Front-Wheel Walker       Plan of Care: Will benefit from Physical Therapy 2 times per week  Discharge Recommendations: Equipment: No Equipment Needed.(already owns a 4ww and w/c)  Pt presents with Acute Kidney Injury, found unresponsive by son at home. Today, pt was eager to get out of bed, tolerated ambulation x 100 feet using FWW with cg assist and up/down 4 stairs with cg assist.  Pt was fatigued after ambulation, but did not show balance or strength issues. Pt reports having housekeeping assist 2x per week at home while son is at work, but that she could increase this if needed. Due to h/o falls at home and some confusion/forgetfulness today, pt would benefit from supervision at home for safety reasons, however, today's functional level showed pt to be close to her baseline. Chart indicates plan for home with hospice, which may be able to assist with supervision issues.   See \"Rehab Therapy-Acute\" Patient Summary Report for complete documentation.     "

## 2018-10-09 NOTE — PROGRESS NOTES
"RN notified MD of lack of peripheral IV access.  Patient is very tearful regarding the stress of   \"being poked or cut\" for an additional IV.  Patient is refusing to have new IV placed.  MD aware.   "

## 2018-10-09 NOTE — DISCHARGE INSTRUCTIONS
Discharge Instructions    Discharged to home by car with relative. Discharged via wheelchair, hospital escort: Yes.  Special equipment needed: Not Applicable    Be sure to schedule a follow-up appointment with your primary care doctor or any specialists as instructed.     Discharge Plan:   Diet Plan: Discussed  Activity Level: Discussed  Confirmed Follow up Appointment: Patient to Call and Schedule Appointment  Confirmed Symptoms Management: Discussed  Medication Reconciliation Updated: Yes  Influenza Vaccine Indication: Indicated: 9 to 64 years of age    I understand that a diet low in cholesterol, fat, and sodium is recommended for good health. Unless I have been given specific instructions below for another diet, I accept this instruction as my diet prescription.   Other diet: as tolerated    Special Instructions: None    · Is patient discharged on Warfarin / Coumadin?   Yes    You are receiving the drug warfarin. Please understand the importance of monitoring warfarin with scheduled PT/INR blood draws.  Follow-up with a call to your personal Doctor's office in 3 days to schedule a PT/INR. .    IMPORTANT: HOW TO USE THIS INFORMATION:  This is a summary and does NOT have all possible information about this product. This information does not assure that this product is safe, effective, or appropriate for you. This information is not individual medical advice and does not substitute for the advice of your health care professional. Always ask your health care professional for complete information about this product and your specific health needs.      WARFARIN - ORAL (WARF-uh-rin)      COMMON BRAND NAME(S): Coumadin      WARNING:  Warfarin can cause very serious (possibly fatal) bleeding. This is more likely to occur when you first start taking this medication or if you take too much warfarin. To decrease your risk for bleeding, your doctor or other health care provider will monitor you closely and check your lab  "results (INR test) to make sure you are not taking too much warfarin. Keep all medical and laboratory appointments. Tell your doctor right away if you notice any signs of serious bleeding. See also Side Effects section.      USES:  This medication is used to treat blood clots (such as in deep vein thrombosis-DVT or pulmonary embolus-PE) and/or to prevent new clots from forming in your body. Preventing harmful blood clots helps to reduce the risk of a stroke or heart attack. Conditions that increase your risk of developing blood clots include a certain type of irregular heart rhythm (atrial fibrillation), heart valve replacement, recent heart attack, and certain surgeries (such as hip/knee replacement). Warfarin is commonly called a \"blood thinner,\" but the more correct term is \"anticoagulant.\" It helps to keep blood flowing smoothly in your body by decreasing the amount of certain substances (clotting proteins) in your blood.      HOW TO USE:  Read the Medication Guide provided by your pharmacist before you start taking warfarin and each time you get a refill. If you have any questions, ask your doctor or pharmacist. Take this medication by mouth with or without food as directed by your doctor or other health care professional, usually once a day. It is very important to take it exactly as directed. Do not increase the dose, take it more frequently, or stop using it unless directed by your doctor. Dosage is based on your medical condition, laboratory tests (such as INR), and response to treatment. Your doctor or other health care provider will monitor you closely while you are taking this medication to determine the right dose for you. Use this medication regularly to get the most benefit from it. To help you remember, take it at the same time each day. It is important to eat a balanced, consistent diet while taking warfarin. Some foods can affect how warfarin works in your body and may affect your treatment and " dose. Avoid sudden large increases or decreases in your intake of foods high in vitamin K (such as broccoli, cauliflower, cabbage, brussels sprouts, kale, spinach, and other green leafy vegetables, liver, green tea, certain vitamin supplements). If you are trying to lose weight, check with your doctor before you try to go on a diet. Cranberry products may also affect how your warfarin works. Limit the amount of cranberry juice (16 ounces/480 milliliters a day) or other cranberry products you may drink or eat.      SIDE EFFECTS:  Nausea, loss of appetite, or stomach/abdominal pain may occur. If any of these effects persist or worsen, tell your doctor or pharmacist promptly. Remember that your doctor has prescribed this medication because he or she has judged that the benefit to you is greater than the risk of side effects. Many people using this medication do not have serious side effects. This medication can cause serious bleeding if it affects your blood clotting proteins too much (shown by unusually high INR lab results). Even if your doctor stops your medication, this risk of bleeding can continue for up to a week. Tell your doctor right away if you have any signs of serious bleeding, including: unusual pain/swelling/discomfort, unusual/easy bruising, prolonged bleeding from cuts or gums, persistent/frequent nosebleeds, unusually heavy/prolonged menstrual flow, pink/dark urine, coughing up blood, vomit that is bloody or looks like coffee grounds, severe headache, dizziness/fainting, unusual or persistent tiredness/weakness, bloody/black/tarry stools, chest pain, shortness of breath, difficulty swallowing. Tell your doctor right away if any of these unlikely but serious side effects occur: persistent nausea/vomiting, severe stomach/abdominal pain, yellowing eyes/skin. This drug rarely has caused very serious (possibly fatal) problems if its effects lead to small blood clots (usually at the beginning of treatment).  This can lead to severe skin/tissue damage that may require surgery or amputation if left untreated. Patients with certain blood conditions (protein C or S deficiency) may be at greater risk. Get medical help right away if any of these rare but serious side effects occur: painful/red/purplish patches on the skin (such as on the toe, breast, abdomen), change in the amount of urine, vision changes, confusion, slurred speech, weakness on one side of the body. A very serious allergic reaction to this drug is rare. However, get medical help right away if you notice any symptoms of a serious allergic reaction, including: rash, itching/swelling (especially of the face/tongue/throat), severe dizziness, trouble breathing. This is not a complete list of possible side effects. If you notice other effects not listed above, contact your doctor or pharmacist. In the [place] - Call your doctor for medical advice about side effects. You may report side effects to FDA at 5-513-DOV-0980. In [place] - Call your doctor for medical advice about side effects. You may report side effects to [facility] [place] at 1-921.362.1342.      PRECAUTIONS:  Before taking warfarin, tell your doctor or pharmacist if you are allergic to it; or if you have any other allergies. This product may contain inactive ingredients, which can cause allergic reactions or other problems. Talk to your pharmacist for more details. Before using this medication, tell your doctor or pharmacist your medical history, especially of: blood disorders (such as anemia, hemophilia), bleeding problems (such as bleeding of the stomach/intestines, bleeding in the brain), blood vessel disorders (such as aneurysms), recent major injury/surgery, liver disease, alcohol use, mental/mood disorders (including memory problems), frequent falls/injuries. It is important that all your doctors and dentists know that you take warfarin. Before having surgery or any medical/dental procedures, tell your  doctor or dentist that you are taking this medication and about all the products you use (including prescription drugs, nonprescription drugs, and herbal products). Avoid getting injections into the muscles. If you must have an injection into a muscle (for example, a flu shot), it should be given in the arm. This way, it will be easier to check for bleeding and/or apply pressure bandages. This medication may cause stomach bleeding. Daily use of alcohol while using this medicine will increase your risk for stomach bleeding and may also affect how this medication works. Limit or avoid alcoholic beverages. If you have not been eating well, if you have an illness or infection that causes fever, vomiting, or diarrhea for more than 2 days, or if you start using any antibiotic medications, contact your doctor or pharmacist immediately because these conditions can affect how warfarin works. This medication can cause heavy bleeding. To lower the chance of getting cut, bruised, or injured, use great caution with sharp objects like safety razors and nail cutters. Use an electric razor when shaving and a soft toothbrush when brushing your teeth. Avoid activities such as contact sports. If you fall or injure yourself, especially if you hit your head, call your doctor immediately. Your doctor may need to check you. The Food & Drug Administration has stated that generic warfarin products are interchangeable. However, consult your doctor or pharmacist before switching warfarin products. Be careful not to take more than one medication that contains warfarin unless specifically directed by the doctor or health care provider who is monitoring your warfarin treatment. Older adults may be at greater risk for bleeding while using this drug. This medication is not recommended for use during pregnancy because of serious (possibly fatal) harm to an unborn baby. Discuss the use of reliable forms of birth control with your doctor. If you  "become pregnant or think you may be pregnant, tell your doctor immediately. If you are planning pregnancy, discuss a plan for managing your condition with your doctor before you become pregnant. Your doctor may switch the type of medication you use during pregnancy. Very small amounts of this medication may pass into breast milk but is unlikely to harm a nursing infant. Consult your doctor before breast-feeding.      DRUG INTERACTIONS:  Drug interactions may change how your medications work or increase your risk for serious side effects. This document does not contain all possible drug interactions. Keep a list of all the products you use (including prescription/nonprescription drugs and herbal products) and share it with your doctor and pharmacist. Do not start, stop, or change the dosage of any medicines without your doctor's approval. Warfarin interacts with many prescription, nonprescription, vitamin, and herbal products. This includes medications that are applied to the skin or inside the vagina or rectum. The interactions with warfarin usually result in an increase or decrease in the \"blood-thinning\" (anticoagulant) effect. Your doctor or other health care professional should closely monitor you to prevent serious bleeding or clotting problems. While taking warfarin, it is very important to tell your doctor or pharmacist of any changes in medications, vitamins, or herbal products that you are taking. Some products that may interact with this drug include: capecitabine, imatinib, mifepristone. Aspirin, aspirin-like drugs (salicylates), and nonsteroidal anti-inflammatory drugs (NSAIDs such as ibuprofen, naproxen, celecoxib) may have effects similar to warfarin. These drugs may increase the risk of bleeding problems if taken during treatment with warfarin. Carefully check all prescription/nonprescription product labels (including drugs applied to the skin such as pain-relieving creams) since the products may " contain NSAIDs or salicylates. Talk to your doctor about using a different medication (such as acetaminophen) to treat pain/fever. Low-dose aspirin and related drugs (such as clopidogrel, ticlopidine) should be continued if prescribed by your doctor for specific medical reasons such as heart attack or stroke prevention. Consult your doctor or pharmacist for more details. Many herbal products interact with warfarin. Tell your doctor before taking any herbal products, especially bromelains, coenzyme Q10, cranberry, danshen, dong quai, fenugreek, garlic, ginkgo biloba, ginseng, and Graceton's wort, among others. This medication may interfere with a certain laboratory test to measure theophylline levels, possibly causing false test results. Make sure laboratory personnel and all your doctors know you use this drug.      OVERDOSE:  If overdose is suspected, contact a poison control center or emergency room immediately. [place] residents can call the [employer] Poison Hotline at 1-931.374.1118. [place] residents can call a provincial poison control center. Symptoms of overdose may include: bloody/black/tarry stools, pink/dark urine, unusual/prolonged bleeding.      NOTES:  Do not share this medication with others. Laboratory and/or medical tests (such as INR, complete blood count) must be performed periodically to monitor your progress or check for side effects. Consult your doctor for more details.      MISSED DOSE:  For the best possible benefit, do not miss any doses. If you do miss a dose and remember on the same day, take it as soon as you remember. If you remember on the next day, skip the missed dose and resume your usual dosing schedule. Do not double the dose to catch up because this could increase your risk for bleeding. Keep a record of missed doses to give to your doctor or pharmacist. Contact your doctor or pharmacist if you miss 2 or more doses in a row.      STORAGE:  Store at room temperature away from light  and moisture. Do not store in the bathroom. Keep all medications away from children and pets. Do not flush medications down the toilet or pour them into a drain unless instructed to do so. Properly discard this product when it is  or no longer needed. Consult your pharmacist or local waste disposal company for more details about how to safely discard your product.      MEDICAL ALERT:  Your condition and medication can cause complications in a medical emergency. For information about enrolling in MedicAlert, call 1-425.397.5478 ([place]) or 1-858.276.4158 ([place]).      Information last revised 2010 Copyright(c)  [employer]             Depression / Suicide Risk    As you are discharged from [facility], it is important to learn how to keep safe from harming yourself.    Recognize the warning signs:  · Abrupt changes in personality, positive or negative- including increase in energy   · Giving away possessions  · Change in eating patterns- significant weight changes-  positive or negative  · Change in sleeping patterns- unable to sleep or sleeping all the time   · Unwillingness or inability to communicate  · Depression  · Unusual sadness, discouragement and loneliness  · Talk of wanting to die  · Neglect of personal appearance   · Rebelliousness- reckless behavior  · Withdrawal from people/activities they love  · Confusion- inability to concentrate     If you or a loved one observes any of these behaviors or has concerns about self-harm, here's what you can do:  · Talk about it- your feelings and reasons for harming yourself  · Remove any means that you might use to hurt yourself (examples: pills, rope, extension cords, firearm)  · Get professional help from the community (Mental Health, Substance Abuse, psychological counseling)  · Do not be alone:Call your Safe Contact- someone whom you trust who will be there for you.  · Call your local CRISIS HOTLINE 790-6832 or  650.805.4490  · Call your local Children's Mobile Crisis Response Team [place] (900) 499-7349 or www.[employer]  · Call the toll free National Suicide Prevention Hotlines   · National Suicide Prevention Lifeline 115-292-FTWX (5017)  · [employer] 800-SUICIDE (670-0718)        Acute Kidney Injury, Adult  Acute kidney injury (GAIL) occurs when there is sudden (acute) damage to the kidneys. A small amount of kidney damage may not cause problems, but a large amount of damage may make it difficult or impossible for the kidneys to work the way they should. GAIL may develop into long-lasting (chronic) kidney disease. Early detection and treatment of GAIL may prevent kidney damage from becoming permanent or getting worse.  What are the causes?  Common causes of this condition include:  · A problem with blood flow to the kidneys. This may be caused by:  ¨ Blood loss.  ¨ Heart and blood vessel (cardiovascular) disease.  ¨ Severe burns.  ¨ Liver disease.  · Direct damage to the kidneys. This may be caused by:  ¨ Some medicines.  ¨ A kidney infection.  ¨ Poisoning.  ¨ Being around or in contact with poisonous (toxic) substances.  ¨ A surgical wound.  ¨ A hard, direct force to the kidney area.  · A sudden blockage of urine flow. This may be caused by:  ¨ Cancer.  ¨ Kidney stones.  ¨ Enlarged prostate in males.  What are the signs or symptoms?  Symptoms develop slowly and may not be obvious until the kidney damage becomes severe. It is possible to have GAIL for years without showing any symptoms. Symptoms of this condition can include:  · Swelling (edema) of the face, legs, ankles, or feet.  · Numbness, tingling, or loss of feeling (sensation) in the hands or feet.  · Tiredness (lethargy).  · Nausea or vomiting.  · Confusion or trouble concentrating.  · Problems with urination, such as:  ¨ Painful or burning feeling during urination.  ¨ Decreased urine production.  ¨ Frequent urination, especially at  night.  ¨ Bloody urine.  · Muscle twitches and cramps, especially in the legs.  · Shortness of breath.  · Weakness.  · Constant itchiness.  · Loss of appetite.  · Metallic taste in the mouth.  · Trouble sleeping.  · Pale lining of the eyelids and surface of the eye (conjunctiva).  How is this diagnosed?  This condition may be diagnosed with various tests. Tests may include:  · Blood tests.  · Urine tests.  · Imaging tests.  · A test in which a sample of tissue is removed from the kidneys to be looked at under a microscope (kidney biopsy).  How is this treated?  Treatment of GAIL varies depending on the cause and severity of the kidney damage. In mild cases, treatment may not be needed. The kidneys may heal on their own.  If GAIL is more severe, your health care provider will treat the cause of the kidney damage, help the kidneys heal, and prevent problems from occurring. Severe cases may require a procedure to remove toxic wastes from the body (dialysis) or surgery to repair kidney damage. Surgery may involve:  · Repair of a torn kidney.  · Removal of a urine flow obstruction.  Follow these instructions at home:  · Follow your prescribed diet.  · Take over-the-counter and prescription medicines only as told by your health care provider.  ¨ Do not take any new medicines unless approved by your health care provider. Many medicines can worsen your kidney damage.  ¨ Do not take any vitamin and mineral supplements unless approved by your health care provider. Many nutritional supplements can worsen your kidney damage.  ¨ The dose of some medicines that you take may need to be adjusted.  · Do not use any tobacco products, such as cigarettes, chewing tobacco, and e-cigarettes. If you need help quitting, ask your health care provider.  · Keep all follow-up visits as told by your health care provider. This is important.  · Keep track of your blood pressure. Report changes in your blood pressure as told by your health care  provider.  · Achieve and maintain a healthy weight. If you need help with this, ask your health care provider.  · Start or continue an exercise plan. Try to exercise at least 30 minutes a day, 5 days a week.  · Stay current with immunizations as told by your health care provider.  Where to find more information:  · American Association of Kidney Patients: www.aakp.org  · National Kidney Foundation: www.kidney.org  · American Kidney Fund: www.akfinc.org  · [employer] [facility]: www.lifeoptions.org and www.kidneyschool.org  Contact a health care provider if:  · Your symptoms get worse.  · You develop new symptoms.  Get help right away if:  · You develop symptoms of end-stage kidney disease, which include:  ¨ Headaches.  ¨ Abnormally dark or light skin.  ¨ Numbness in the hands or feet.  ¨ Easy bruising.  ¨ Frequent hiccups.  ¨ Chest pain.  ¨ Shortness of breath.  ¨ End of menstruation in women.  · You have a fever.  · You have decreased urine production.  · You have pain or bleeding when you urinate.  This information is not intended to replace advice given to you by your health care provider. Make sure you discuss any questions you have with your health care provider.  Document Released: 07/02/2012 Document Revised: 07/27/2017 Document Reviewed: 08/16/2013  [employer]vier Interactive Patient Education © 2017 [employer]

## 2018-10-09 NOTE — PROGRESS NOTES
Bedside report received from day RN; assumed pt care. Pt assessment complete. Pt A&Ox3 Reviewed plan of care with pt. Tele box on and rhythm verified. Chart and labs reviewed. Bed in lowest position, and 2 side rails up. Pt educated on call light; call light with in reach.

## 2018-10-09 NOTE — DIETARY
Nutrition Services:  Patient noted to have poor PO intake/ weight loss PTA, per nutrition admission screening.  Pt states that she is eating at least 50% of Dysphagia 1/NTL/Vegetarian meals at this time and denies weight loss over the last 3-6 months.  Pt also states this is typical of her home nutrition regimen, she states.  No nutritional interventions required at this time; please consult RD prn.  Safest Po diet texture per SLP.

## 2018-10-10 ENCOUNTER — PATIENT OUTREACH (OUTPATIENT)
Dept: HEALTH INFORMATION MANAGEMENT | Facility: OTHER | Age: 73
End: 2018-10-10

## 2018-10-10 LAB
BACTERIA BLD CULT: NORMAL
BACTERIA BLD CULT: NORMAL
SIGNIFICANT IND 70042: NORMAL
SIGNIFICANT IND 70042: NORMAL
SITE SITE: NORMAL
SITE SITE: NORMAL
SOURCE SOURCE: NORMAL
SOURCE SOURCE: NORMAL

## 2018-10-20 LAB — EKG IMPRESSION: NORMAL

## 2019-02-19 NOTE — CARE PLAN
Physical Therapy Progress Note     02/19/19 6106   Pain Assessment   Pain Assessment No/denies pain   Restrictions/Precautions   Weight Bearing Precautions Per Order No   Other Precautions Fall Risk;Aspiration;Visual impairment   General   Chart Reviewed Yes   Family/Caregiver Present No   Cognition   Overall Cognitive Status WFL   Subjective   Subjective Agreeable to PT  Pt  sleeping in s/l upon entry in bed   Bed Mobility   Supine to Sit 6  Modified independent   Transfers   Sit to Stand 5  Supervision   Additional items Verbal cues; Assist x 1   Stand to Sit 5  Supervision   Stand pivot 5  Supervision   Ambulation/Elevation   Gait pattern Improper Weight shift;Decreased foot clearance;Decreased L stance; Excessively slow; Short stride   Gait Assistance 4  Minimal assist   Additional items Assist x 1;Verbal cues   Assistive Device Rolling walker;Straight cane   Distance 80ft, 160ft, 220ft   Endurance Deficit   Endurance Deficit Yes   Endurance Deficit Description fatigue   Assessment   Prognosis Good   Problem List Decreased strength;Decreased endurance; Impaired balance;Decreased mobility; Decreased coordination; Impaired judgement   Assessment Pt  progressing well with mobility however continues to require A for Long distance as patient fatigues  Noted tremors in LLE while attempting to perform standing TE's and patient reported she is unable to do it  Pt  noted with steadier gait with RW  Needed standing rest furing 220ft gait  Pt  ambulated with SPC and RW  Adjusted the height of SPC to fit patient better, Per RN patient ambulates in room independently  Pt  reported she ha impaired vision however patient able to read the time out correctly from the clock which was located behind patient to the right   Pt  will benefit from continued skilled PT to improve safe mobility and attain PLOF   Barriers to Discharge Inaccessible home environment;Decreased caregiver support   Goals   Patient Goals None reported   STG Problem: Knowledge Deficit  Goal: Knowledge of the prescribed therapeutic regimen will improve  Outcome: PROGRESSING AS EXPECTED  Pt educated on plan of care. Pt verbalized understanding.     Problem: Pain Management  Goal: Pain level will decrease to patient's comfort goal  Outcome: PROGRESSING AS EXPECTED  Pt complained of back pain 7/10. Pt medicated per MAR.        Expiration Date 02/28/19   Treatment Day 1   Plan   Treatment/Interventions Functional transfer training;Patient/family training;Equipment eval/education;Gait training;Bed mobility;Spoke to nursing   Progress Progressing toward goals   PT Frequency 5x/wk         Giovanny Gomez PTA

## 2019-07-09 ENCOUNTER — APPOINTMENT (OUTPATIENT)
Dept: RADIOLOGY | Facility: MEDICAL CENTER | Age: 74
End: 2019-07-09
Attending: EMERGENCY MEDICINE
Payer: MEDICARE

## 2019-07-09 ENCOUNTER — HOSPITAL ENCOUNTER (OUTPATIENT)
Facility: MEDICAL CENTER | Age: 74
End: 2019-07-17
Attending: EMERGENCY MEDICINE | Admitting: INTERNAL MEDICINE
Payer: MEDICARE

## 2019-07-09 DIAGNOSIS — R19.7 DIARRHEA, UNSPECIFIED TYPE: ICD-10-CM

## 2019-07-09 DIAGNOSIS — F11.288: ICD-10-CM

## 2019-07-09 DIAGNOSIS — R41.82 ALTERED MENTAL STATUS, UNSPECIFIED ALTERED MENTAL STATUS TYPE: ICD-10-CM

## 2019-07-09 DIAGNOSIS — E86.0 DEHYDRATION: ICD-10-CM

## 2019-07-09 DIAGNOSIS — N17.9 AKI (ACUTE KIDNEY INJURY) (HCC): ICD-10-CM

## 2019-07-09 DIAGNOSIS — R10.84 GENERALIZED ABDOMINAL PAIN: ICD-10-CM

## 2019-07-09 DIAGNOSIS — M05.79 RHEUMATOID ARTHRITIS INVOLVING MULTIPLE SITES WITH POSITIVE RHEUMATOID FACTOR (HCC): ICD-10-CM

## 2019-07-09 PROBLEM — K52.1 DIARRHEA DUE TO DRUG: Status: ACTIVE | Noted: 2019-07-09

## 2019-07-09 LAB
ALBUMIN SERPL BCP-MCNC: 3.9 G/DL (ref 3.2–4.9)
ALBUMIN/GLOB SERPL: 1.1 G/DL
ALP SERPL-CCNC: 57 U/L (ref 30–99)
ALT SERPL-CCNC: 20 U/L (ref 2–50)
ANION GAP SERPL CALC-SCNC: 12 MMOL/L (ref 0–11.9)
AST SERPL-CCNC: 26 U/L (ref 12–45)
BASOPHILS # BLD AUTO: 0.3 % (ref 0–1.8)
BASOPHILS # BLD: 0.03 K/UL (ref 0–0.12)
BILIRUB SERPL-MCNC: 1.3 MG/DL (ref 0.1–1.5)
BUN SERPL-MCNC: 35 MG/DL (ref 8–22)
CALCIUM SERPL-MCNC: 8.7 MG/DL (ref 8.4–10.2)
CHLORIDE SERPL-SCNC: 107 MMOL/L (ref 96–112)
CO2 SERPL-SCNC: 22 MMOL/L (ref 20–33)
CREAT SERPL-MCNC: 1.56 MG/DL (ref 0.5–1.4)
EOSINOPHIL # BLD AUTO: 0.11 K/UL (ref 0–0.51)
EOSINOPHIL NFR BLD: 0.9 % (ref 0–6.9)
ERYTHROCYTE [DISTWIDTH] IN BLOOD BY AUTOMATED COUNT: 48 FL (ref 35.9–50)
GLOBULIN SER CALC-MCNC: 3.5 G/DL (ref 1.9–3.5)
GLUCOSE SERPL-MCNC: 101 MG/DL (ref 65–99)
HCT VFR BLD AUTO: 35.5 % (ref 37–47)
HGB BLD-MCNC: 11.2 G/DL (ref 12–16)
IMM GRANULOCYTES # BLD AUTO: 0.04 K/UL (ref 0–0.11)
IMM GRANULOCYTES NFR BLD AUTO: 0.3 % (ref 0–0.9)
INR PPP: 1.1 (ref 0.87–1.13)
LIPASE SERPL-CCNC: 30 U/L (ref 7–58)
LYMPHOCYTES # BLD AUTO: 1.68 K/UL (ref 1–4.8)
LYMPHOCYTES NFR BLD: 14.1 % (ref 22–41)
MCH RBC QN AUTO: 28.8 PG (ref 27–33)
MCHC RBC AUTO-ENTMCNC: 31.5 G/DL (ref 33.6–35)
MCV RBC AUTO: 91.3 FL (ref 81.4–97.8)
MONOCYTES # BLD AUTO: 0.99 K/UL (ref 0–0.85)
MONOCYTES NFR BLD AUTO: 8.3 % (ref 0–13.4)
NEUTROPHILS # BLD AUTO: 9.09 K/UL (ref 2–7.15)
NEUTROPHILS NFR BLD: 76.1 % (ref 44–72)
NRBC # BLD AUTO: 0 K/UL
NRBC BLD-RTO: 0 /100 WBC
PLATELET # BLD AUTO: 317 K/UL (ref 164–446)
PMV BLD AUTO: 10.1 FL (ref 9–12.9)
POTASSIUM SERPL-SCNC: 3.5 MMOL/L (ref 3.6–5.5)
PROT SERPL-MCNC: 7.4 G/DL (ref 6–8.2)
PROTHROMBIN TIME: 14.4 SEC (ref 12–14.6)
RBC # BLD AUTO: 3.89 M/UL (ref 4.2–5.4)
SODIUM SERPL-SCNC: 141 MMOL/L (ref 135–145)
WBC # BLD AUTO: 11.9 K/UL (ref 4.8–10.8)

## 2019-07-09 PROCEDURE — A9270 NON-COVERED ITEM OR SERVICE: HCPCS | Performed by: INTERNAL MEDICINE

## 2019-07-09 PROCEDURE — 80053 COMPREHEN METABOLIC PANEL: CPT

## 2019-07-09 PROCEDURE — 36415 COLL VENOUS BLD VENIPUNCTURE: CPT

## 2019-07-09 PROCEDURE — 700111 HCHG RX REV CODE 636 W/ 250 OVERRIDE (IP): Performed by: EMERGENCY MEDICINE

## 2019-07-09 PROCEDURE — 700102 HCHG RX REV CODE 250 W/ 637 OVERRIDE(OP): Performed by: INTERNAL MEDICINE

## 2019-07-09 PROCEDURE — 96372 THER/PROPH/DIAG INJ SC/IM: CPT

## 2019-07-09 PROCEDURE — 99285 EMERGENCY DEPT VISIT HI MDM: CPT

## 2019-07-09 PROCEDURE — 700105 HCHG RX REV CODE 258: Performed by: EMERGENCY MEDICINE

## 2019-07-09 PROCEDURE — G0378 HOSPITAL OBSERVATION PER HR: HCPCS

## 2019-07-09 PROCEDURE — 700117 HCHG RX CONTRAST REV CODE 255: Performed by: EMERGENCY MEDICINE

## 2019-07-09 PROCEDURE — 96375 TX/PRO/DX INJ NEW DRUG ADDON: CPT

## 2019-07-09 PROCEDURE — 99219 PR INITIAL OBSERVATION CARE,LEVL II: CPT | Performed by: INTERNAL MEDICINE

## 2019-07-09 PROCEDURE — 700111 HCHG RX REV CODE 636 W/ 250 OVERRIDE (IP): Performed by: INTERNAL MEDICINE

## 2019-07-09 PROCEDURE — 85610 PROTHROMBIN TIME: CPT

## 2019-07-09 PROCEDURE — 96374 THER/PROPH/DIAG INJ IV PUSH: CPT

## 2019-07-09 PROCEDURE — 73562 X-RAY EXAM OF KNEE 3: CPT | Mod: LT

## 2019-07-09 PROCEDURE — 700105 HCHG RX REV CODE 258: Performed by: INTERNAL MEDICINE

## 2019-07-09 PROCEDURE — 74176 CT ABD & PELVIS W/O CONTRAST: CPT

## 2019-07-09 PROCEDURE — 85025 COMPLETE CBC W/AUTO DIFF WBC: CPT

## 2019-07-09 PROCEDURE — 83690 ASSAY OF LIPASE: CPT

## 2019-07-09 RX ORDER — TOLTERODINE 4 MG/1
4 CAPSULE, EXTENDED RELEASE ORAL EVERY EVENING
Status: DISCONTINUED | OUTPATIENT
Start: 2019-07-09 | End: 2019-07-09

## 2019-07-09 RX ORDER — ALPRAZOLAM 1 MG/1
1 TABLET ORAL 3 TIMES DAILY PRN
Status: ON HOLD | COMMUNITY
End: 2019-07-17

## 2019-07-09 RX ORDER — TRAZODONE HYDROCHLORIDE 100 MG/1
200 TABLET ORAL NIGHTLY
COMMUNITY

## 2019-07-09 RX ORDER — ESOMEPRAZOLE MAGNESIUM 40 MG/1
40 CAPSULE, DELAYED RELEASE ORAL PRN
COMMUNITY

## 2019-07-09 RX ORDER — ACETAMINOPHEN 325 MG/1
650 TABLET ORAL EVERY 6 HOURS PRN
Status: DISCONTINUED | OUTPATIENT
Start: 2019-07-09 | End: 2019-07-16

## 2019-07-09 RX ORDER — TOLTERODINE 4 MG/1
4 CAPSULE, EXTENDED RELEASE ORAL EVERY EVENING
COMMUNITY

## 2019-07-09 RX ORDER — AMMONIUM LACTATE 12 G/100G
1 LOTION TOPICAL DAILY
Status: DISCONTINUED | OUTPATIENT
Start: 2019-07-10 | End: 2019-07-17 | Stop reason: HOSPADM

## 2019-07-09 RX ORDER — BISOPROLOL FUMARATE 10 MG/1
2.5 TABLET, FILM COATED ORAL DAILY
Status: DISCONTINUED | OUTPATIENT
Start: 2019-07-10 | End: 2019-07-17 | Stop reason: HOSPADM

## 2019-07-09 RX ORDER — METHADONE HYDROCHLORIDE 10 MG/1
5 TABLET ORAL EVERY 6 HOURS PRN
Status: DISCONTINUED | OUTPATIENT
Start: 2019-07-09 | End: 2019-07-10

## 2019-07-09 RX ORDER — SODIUM CHLORIDE 9 MG/ML
1000 INJECTION, SOLUTION INTRAVENOUS ONCE
Status: COMPLETED | OUTPATIENT
Start: 2019-07-09 | End: 2019-07-09

## 2019-07-09 RX ORDER — AMMONIUM LACTATE 12 G/100G
1 LOTION TOPICAL DAILY
COMMUNITY

## 2019-07-09 RX ORDER — BUPROPION HYDROCHLORIDE 150 MG/1
150 TABLET, EXTENDED RELEASE ORAL 2 TIMES DAILY
Status: DISCONTINUED | OUTPATIENT
Start: 2019-07-09 | End: 2019-07-11

## 2019-07-09 RX ORDER — METHADONE HYDROCHLORIDE 5 MG/1
5 TABLET ORAL EVERY 6 HOURS
Status: ON HOLD | COMMUNITY
End: 2019-07-17

## 2019-07-09 RX ORDER — BUDESONIDE AND FORMOTEROL FUMARATE DIHYDRATE 160; 4.5 UG/1; UG/1
2 AEROSOL RESPIRATORY (INHALATION) 2 TIMES DAILY
Status: DISCONTINUED | OUTPATIENT
Start: 2019-07-09 | End: 2019-07-17 | Stop reason: HOSPADM

## 2019-07-09 RX ORDER — ALBUTEROL SULFATE 90 UG/1
2 AEROSOL, METERED RESPIRATORY (INHALATION) EVERY 6 HOURS PRN
Status: DISCONTINUED | OUTPATIENT
Start: 2019-07-09 | End: 2019-07-17 | Stop reason: HOSPADM

## 2019-07-09 RX ORDER — METHADONE HYDROCHLORIDE 10 MG/1
5 TABLET ORAL EVERY 6 HOURS
Status: DISCONTINUED | OUTPATIENT
Start: 2019-07-10 | End: 2019-07-09

## 2019-07-09 RX ORDER — OXYBUTYNIN CHLORIDE 5 MG/1
10 TABLET, EXTENDED RELEASE ORAL EVERY EVENING
Status: DISCONTINUED | OUTPATIENT
Start: 2019-07-10 | End: 2019-07-11

## 2019-07-09 RX ORDER — HEPARIN SODIUM 5000 [USP'U]/ML
5000 INJECTION, SOLUTION INTRAVENOUS; SUBCUTANEOUS EVERY 8 HOURS
Status: DISCONTINUED | OUTPATIENT
Start: 2019-07-09 | End: 2019-07-11

## 2019-07-09 RX ORDER — MORPHINE SULFATE 4 MG/ML
4 INJECTION, SOLUTION INTRAMUSCULAR; INTRAVENOUS ONCE
Status: COMPLETED | OUTPATIENT
Start: 2019-07-09 | End: 2019-07-09

## 2019-07-09 RX ORDER — LISINOPRIL 5 MG/1
5 TABLET ORAL 2 TIMES DAILY
Status: DISCONTINUED | OUTPATIENT
Start: 2019-07-09 | End: 2019-07-17 | Stop reason: HOSPADM

## 2019-07-09 RX ORDER — LEVOTHYROXINE SODIUM 0.12 MG/1
125 TABLET ORAL
Status: DISCONTINUED | OUTPATIENT
Start: 2019-07-10 | End: 2019-07-17 | Stop reason: HOSPADM

## 2019-07-09 RX ORDER — SODIUM CHLORIDE 9 MG/ML
2000 INJECTION, SOLUTION INTRAVENOUS CONTINUOUS
Status: DISCONTINUED | OUTPATIENT
Start: 2019-07-09 | End: 2019-07-16

## 2019-07-09 RX ORDER — BUDESONIDE AND FORMOTEROL FUMARATE DIHYDRATE 160; 4.5 UG/1; UG/1
2 AEROSOL RESPIRATORY (INHALATION) 2 TIMES DAILY
COMMUNITY

## 2019-07-09 RX ORDER — DICYCLOMINE HYDROCHLORIDE 10 MG/ML
20 INJECTION INTRAMUSCULAR ONCE
Status: COMPLETED | OUTPATIENT
Start: 2019-07-09 | End: 2019-07-09

## 2019-07-09 RX ORDER — ALPRAZOLAM 0.5 MG/1
1 TABLET ORAL 3 TIMES DAILY PRN
Status: DISCONTINUED | OUTPATIENT
Start: 2019-07-09 | End: 2019-07-10

## 2019-07-09 RX ORDER — ONDANSETRON 2 MG/ML
4 INJECTION INTRAMUSCULAR; INTRAVENOUS EVERY 4 HOURS PRN
Status: DISCONTINUED | OUTPATIENT
Start: 2019-07-09 | End: 2019-07-17 | Stop reason: HOSPADM

## 2019-07-09 RX ORDER — ONDANSETRON 4 MG/1
4 TABLET, ORALLY DISINTEGRATING ORAL EVERY 4 HOURS PRN
Status: DISCONTINUED | OUTPATIENT
Start: 2019-07-09 | End: 2019-07-17 | Stop reason: HOSPADM

## 2019-07-09 RX ORDER — AMOXICILLIN 500 MG/1
500 CAPSULE ORAL 2 TIMES DAILY
Status: ON HOLD | COMMUNITY
Start: 2019-07-04 | End: 2019-07-17

## 2019-07-09 RX ADMIN — SODIUM CHLORIDE 2000 ML: 900 INJECTION, SOLUTION INTRAVENOUS at 21:45

## 2019-07-09 RX ADMIN — SODIUM CHLORIDE 1000 ML: 9 INJECTION, SOLUTION INTRAVENOUS at 17:42

## 2019-07-09 RX ADMIN — HEPARIN SODIUM 5000 UNITS: 5000 INJECTION, SOLUTION INTRAVENOUS; SUBCUTANEOUS at 23:45

## 2019-07-09 RX ADMIN — MORPHINE SULFATE 4 MG: 4 INJECTION INTRAVENOUS at 19:41

## 2019-07-09 RX ADMIN — DICYCLOMINE HYDROCHLORIDE 20 MG: 20 INJECTION, SOLUTION INTRAMUSCULAR at 18:25

## 2019-07-09 RX ADMIN — BUDESONIDE AND FORMOTEROL FUMARATE DIHYDRATE 2 PUFF: 160; 4.5 AEROSOL RESPIRATORY (INHALATION) at 23:45

## 2019-07-09 RX ADMIN — LISINOPRIL 5 MG: 5 TABLET ORAL at 23:45

## 2019-07-09 RX ADMIN — IOHEXOL 25 ML: 240 INJECTION, SOLUTION INTRATHECAL; INTRAVASCULAR; INTRAVENOUS; ORAL at 19:47

## 2019-07-09 ASSESSMENT — LIFESTYLE VARIABLES
EVER_SMOKED: NEVER
ALCOHOL_USE: NO

## 2019-07-09 ASSESSMENT — ENCOUNTER SYMPTOMS
VOMITING: 0
DIARRHEA: 1
HEADACHES: 0
COUGH: 0
BLOOD IN STOOL: 0
SHORTNESS OF BREATH: 0
ABDOMINAL PAIN: 1
NAUSEA: 0

## 2019-07-09 ASSESSMENT — PATIENT HEALTH QUESTIONNAIRE - PHQ9
5. POOR APPETITE OR OVEREATING: NEARLY EVERY DAY
1. LITTLE INTEREST OR PLEASURE IN DOING THINGS: NOT AT ALL
SUM OF ALL RESPONSES TO PHQ9 QUESTIONS 1 AND 2: 0
7. TROUBLE CONCENTRATING ON THINGS, SUCH AS READING THE NEWSPAPER OR WATCHING TELEVISION: SEVERAL DAYS
9. THOUGHTS THAT YOU WOULD BE BETTER OFF DEAD, OR OF HURTING YOURSELF: NOT AT ALL
1. LITTLE INTEREST OR PLEASURE IN DOING THINGS: NEARLY EVERY DAY
2. FEELING DOWN, DEPRESSED, IRRITABLE, OR HOPELESS: NEARLY EVERY DAY
3. TROUBLE FALLING OR STAYING ASLEEP OR SLEEPING TOO MUCH: SEVERAL DAYS
8. MOVING OR SPEAKING SO SLOWLY THAT OTHER PEOPLE COULD HAVE NOTICED. OR THE OPPOSITE, BEING SO FIGETY OR RESTLESS THAT YOU HAVE BEEN MOVING AROUND A LOT MORE THAN USUAL: NOT AT ALL
4. FEELING TIRED OR HAVING LITTLE ENERGY: NEARLY EVERY DAY
SUM OF ALL RESPONSES TO PHQ9 QUESTIONS 1 AND 2: 6
6. FEELING BAD ABOUT YOURSELF - OR THAT YOU ARE A FAILURE OR HAVE LET YOURSELF OR YOUR FAMILY DOWN: MORE THAN HALF THE DAYS
SUM OF ALL RESPONSES TO PHQ QUESTIONS 1-9: 16
2. FEELING DOWN, DEPRESSED, IRRITABLE, OR HOPELESS: NOT AT ALL

## 2019-07-09 ASSESSMENT — COGNITIVE AND FUNCTIONAL STATUS - GENERAL
EATING MEALS: A LOT
WALKING IN HOSPITAL ROOM: TOTAL
DRESSING REGULAR LOWER BODY CLOTHING: A LOT
SUGGESTED CMS G CODE MODIFIER DAILY ACTIVITY: CL
HELP NEEDED FOR BATHING: TOTAL
DRESSING REGULAR UPPER BODY CLOTHING: A LOT
STANDING UP FROM CHAIR USING ARMS: TOTAL
MOVING TO AND FROM BED TO CHAIR: UNABLE
CLIMB 3 TO 5 STEPS WITH RAILING: TOTAL
DAILY ACTIVITIY SCORE: 10
PERSONAL GROOMING: A LOT
MOBILITY SCORE: 7
SUGGESTED CMS G CODE MODIFIER MOBILITY: CM
MOVING FROM LYING ON BACK TO SITTING ON SIDE OF FLAT BED: UNABLE
TOILETING: TOTAL
TURNING FROM BACK TO SIDE WHILE IN FLAT BAD: A LOT

## 2019-07-09 NOTE — ED TRIAGE NOTES
Chief Complaint   Patient presents with   • Diarrhea     X 3 days, this pt has a strong medical history, she appears alert but does not know why she has this very runny diarrhea, denies antibiotic use. All started with abd cramping   BS in field 125, she was given 4 mg Zofran in field for co nausea that helped a lot. Today PT came to her house and she could not get her out of bed, medics are not sure if she can use a walker, she does have an electric car. Her diarrhea is very odiferous.

## 2019-07-09 NOTE — ED NOTES
Med rec updated and complete  Allergies reviewed  Pt reports that she takes care of her own medications herself, sometimes her son (Alexander) helps her.  Called Alexander @ [phone], left message.  Called Kehinde @ [phone], Kehinde reports that he has no idea what medications she is taking or what pharmacy that she goes too.  Pt reports that she goes to [facility], called [facility] @ [phone], last time pt picked up medications was on 5/2019.  Called [facility] @ [phone] did a central search, pt last picked up any medications was on 9/2018.  Called [facility] @ [phone] to verify medications.  Pt last picked up METHADONE 5MG and XANAX 1MG on 6/2019 for 30 day supply.  Pt reports no antibiotics in the last 2 weeks.  Pt reports no vitamins.

## 2019-07-10 LAB
ANION GAP SERPL CALC-SCNC: 8 MMOL/L (ref 0–11.9)
BASOPHILS # BLD AUTO: 0.5 % (ref 0–1.8)
BASOPHILS # BLD: 0.05 K/UL (ref 0–0.12)
BUN SERPL-MCNC: 24 MG/DL (ref 8–22)
CALCIUM SERPL-MCNC: 7.9 MG/DL (ref 8.4–10.2)
CHLORIDE SERPL-SCNC: 112 MMOL/L (ref 96–112)
CO2 SERPL-SCNC: 19 MMOL/L (ref 20–33)
CREAT SERPL-MCNC: 1.13 MG/DL (ref 0.5–1.4)
EOSINOPHIL # BLD AUTO: 0.51 K/UL (ref 0–0.51)
EOSINOPHIL NFR BLD: 4.7 % (ref 0–6.9)
ERYTHROCYTE [DISTWIDTH] IN BLOOD BY AUTOMATED COUNT: 49.8 FL (ref 35.9–50)
GLUCOSE SERPL-MCNC: 84 MG/DL (ref 65–99)
HCT VFR BLD AUTO: 32.4 % (ref 37–47)
HGB BLD-MCNC: 10 G/DL (ref 12–16)
IMM GRANULOCYTES # BLD AUTO: 0.04 K/UL (ref 0–0.11)
IMM GRANULOCYTES NFR BLD AUTO: 0.4 % (ref 0–0.9)
LYMPHOCYTES # BLD AUTO: 1.69 K/UL (ref 1–4.8)
LYMPHOCYTES NFR BLD: 15.5 % (ref 22–41)
MCH RBC QN AUTO: 29.2 PG (ref 27–33)
MCHC RBC AUTO-ENTMCNC: 30.9 G/DL (ref 33.6–35)
MCV RBC AUTO: 94.5 FL (ref 81.4–97.8)
MONOCYTES # BLD AUTO: 0.76 K/UL (ref 0–0.85)
MONOCYTES NFR BLD AUTO: 7 % (ref 0–13.4)
NEUTROPHILS # BLD AUTO: 7.87 K/UL (ref 2–7.15)
NEUTROPHILS NFR BLD: 71.9 % (ref 44–72)
NRBC # BLD AUTO: 0 K/UL
NRBC BLD-RTO: 0 /100 WBC
PLATELET # BLD AUTO: 263 K/UL (ref 164–446)
PMV BLD AUTO: 10 FL (ref 9–12.9)
POTASSIUM SERPL-SCNC: 3.7 MMOL/L (ref 3.6–5.5)
RBC # BLD AUTO: 3.43 M/UL (ref 4.2–5.4)
SODIUM SERPL-SCNC: 139 MMOL/L (ref 135–145)
WBC # BLD AUTO: 10.9 K/UL (ref 4.8–10.8)

## 2019-07-10 PROCEDURE — 99226 PR SUBSEQUENT OBSERVATION CARE,LEVEL III: CPT | Performed by: HOSPITALIST

## 2019-07-10 PROCEDURE — 96366 THER/PROPH/DIAG IV INF ADDON: CPT

## 2019-07-10 PROCEDURE — 700102 HCHG RX REV CODE 250 W/ 637 OVERRIDE(OP)

## 2019-07-10 PROCEDURE — 700102 HCHG RX REV CODE 250 W/ 637 OVERRIDE(OP): Performed by: HOSPITALIST

## 2019-07-10 PROCEDURE — 700101 HCHG RX REV CODE 250: Performed by: HOSPITALIST

## 2019-07-10 PROCEDURE — A9270 NON-COVERED ITEM OR SERVICE: HCPCS | Performed by: INTERNAL MEDICINE

## 2019-07-10 PROCEDURE — 700111 HCHG RX REV CODE 636 W/ 250 OVERRIDE (IP): Performed by: INTERNAL MEDICINE

## 2019-07-10 PROCEDURE — 700102 HCHG RX REV CODE 250 W/ 637 OVERRIDE(OP): Performed by: INTERNAL MEDICINE

## 2019-07-10 PROCEDURE — A9270 NON-COVERED ITEM OR SERVICE: HCPCS

## 2019-07-10 PROCEDURE — 96365 THER/PROPH/DIAG IV INF INIT: CPT

## 2019-07-10 PROCEDURE — 80048 BASIC METABOLIC PNL TOTAL CA: CPT

## 2019-07-10 PROCEDURE — 96372 THER/PROPH/DIAG INJ SC/IM: CPT

## 2019-07-10 PROCEDURE — 85025 COMPLETE CBC W/AUTO DIFF WBC: CPT

## 2019-07-10 PROCEDURE — 700111 HCHG RX REV CODE 636 W/ 250 OVERRIDE (IP): Performed by: HOSPITALIST

## 2019-07-10 PROCEDURE — G0378 HOSPITAL OBSERVATION PER HR: HCPCS

## 2019-07-10 PROCEDURE — 36415 COLL VENOUS BLD VENIPUNCTURE: CPT

## 2019-07-10 RX ORDER — METHADONE HYDROCHLORIDE 5 MG/1
5 TABLET ORAL DAILY
Status: DISCONTINUED | OUTPATIENT
Start: 2019-07-11 | End: 2019-07-10

## 2019-07-10 RX ORDER — MORPHINE SULFATE 10 MG/5ML
5 SOLUTION ORAL EVERY 6 HOURS PRN
Status: DISCONTINUED | OUTPATIENT
Start: 2019-07-10 | End: 2019-07-12

## 2019-07-10 RX ORDER — NYSTATIN 100000 [USP'U]/G
POWDER TOPICAL 2 TIMES DAILY
Status: DISCONTINUED | OUTPATIENT
Start: 2019-07-10 | End: 2019-07-17 | Stop reason: HOSPADM

## 2019-07-10 RX ORDER — MAGNESIUM SULFATE HEPTAHYDRATE 40 MG/ML
2 INJECTION, SOLUTION INTRAVENOUS ONCE
Status: COMPLETED | OUTPATIENT
Start: 2019-07-10 | End: 2019-07-10

## 2019-07-10 RX ADMIN — VANCOMYCIN 125 MG: KIT at 00:04

## 2019-07-10 RX ADMIN — MORPHINE SULFATE 5 MG: 10 SOLUTION ORAL at 19:38

## 2019-07-10 RX ADMIN — MAGNESIUM SULFATE HEPTAHYDRATE 2 G: 40 INJECTION, SOLUTION INTRAVENOUS at 13:34

## 2019-07-10 RX ADMIN — HEPARIN SODIUM 5000 UNITS: 5000 INJECTION, SOLUTION INTRAVENOUS; SUBCUTANEOUS at 05:11

## 2019-07-10 RX ADMIN — HEPARIN SODIUM 5000 UNITS: 5000 INJECTION, SOLUTION INTRAVENOUS; SUBCUTANEOUS at 21:20

## 2019-07-10 RX ADMIN — METHADONE HYDROCHLORIDE 5 MG: 10 TABLET ORAL at 06:37

## 2019-07-10 RX ADMIN — MORPHINE SULFATE 5 MG: 10 SOLUTION ORAL at 13:38

## 2019-07-10 RX ADMIN — VANCOMYCIN 125 MG: KIT at 00:01

## 2019-07-10 RX ADMIN — BUDESONIDE AND FORMOTEROL FUMARATE DIHYDRATE 2 PUFF: 160; 4.5 AEROSOL RESPIRATORY (INHALATION) at 17:18

## 2019-07-10 RX ADMIN — BISOPROLOL FUMARATE 2.5 MG: 10 TABLET ORAL at 09:01

## 2019-07-10 RX ADMIN — LISINOPRIL 5 MG: 5 TABLET ORAL at 05:13

## 2019-07-10 RX ADMIN — VANCOMYCIN 125 MG: KIT at 19:08

## 2019-07-10 RX ADMIN — LISINOPRIL 5 MG: 5 TABLET ORAL at 17:19

## 2019-07-10 RX ADMIN — VANCOMYCIN 125 MG: KIT at 05:12

## 2019-07-10 RX ADMIN — BUDESONIDE AND FORMOTEROL FUMARATE DIHYDRATE 2 PUFF: 160; 4.5 AEROSOL RESPIRATORY (INHALATION) at 05:13

## 2019-07-10 RX ADMIN — OXYBUTYNIN CHLORIDE 10 MG: 5 TABLET, EXTENDED RELEASE ORAL at 17:18

## 2019-07-10 RX ADMIN — HEPARIN SODIUM 5000 UNITS: 5000 INJECTION, SOLUTION INTRAVENOUS; SUBCUTANEOUS at 13:34

## 2019-07-10 RX ADMIN — ACETAMINOPHEN 650 MG: 325 TABLET, FILM COATED ORAL at 20:59

## 2019-07-10 RX ADMIN — NYSTATIN: 100000 POWDER TOPICAL at 17:18

## 2019-07-10 RX ADMIN — LEVOTHYROXINE SODIUM 125 MCG: 125 TABLET ORAL at 05:12

## 2019-07-10 RX ADMIN — BUPROPION HYDROCHLORIDE 150 MG: 150 TABLET, EXTENDED RELEASE ORAL at 06:00

## 2019-07-10 ASSESSMENT — ENCOUNTER SYMPTOMS
NAUSEA: 0
GASTROINTESTINAL NEGATIVE: 1
CONSTITUTIONAL NEGATIVE: 1
RESPIRATORY NEGATIVE: 1
DIZZINESS: 0
FOCAL WEAKNESS: 0
VOMITING: 0
NEUROLOGICAL NEGATIVE: 1
EYES NEGATIVE: 1
PALPITATIONS: 0
SHORTNESS OF BREATH: 0
HEADACHES: 0
DEPRESSION: 0
BRUISES/BLEEDS EASILY: 0
COUGH: 0
ABDOMINAL PAIN: 0
PSYCHIATRIC NEGATIVE: 1
BLURRED VISION: 0
CARDIOVASCULAR NEGATIVE: 1
MYALGIAS: 0

## 2019-07-10 ASSESSMENT — PAIN SCALES - PAIN ASSESSMENT IN ADVANCED DEMENTIA (PAINAD)
FACIALEXPRESSION: SAD, FRIGHTENED, FROWN
NEGVOCALIZATION: OCCASIONAL MOAN/GROAN, LOW SPEECH, NEGATIVE/DISAPPROVING QUALITY
BREATHING: NORMAL
TOTALSCORE: 4
CONSOLABILITY: DISTRACTED OR REASSURED BY VOICE/TOUCH
BODYLANGUAGE: TENSE, DISTRESSED PACING, FIDGETING

## 2019-07-10 ASSESSMENT — LIFESTYLE VARIABLES: SUBSTANCE_ABUSE: 0

## 2019-07-10 NOTE — ASSESSMENT & PLAN NOTE
Declined by hospice as she was just released from hospice after completing their 6 month limit  No further evidence of opiate w/d, She has been weaned from methadone and is requesting pain rx.  Exam improving and pain improving  Continue, scheduled tylenol, lidocaine patch prn and gabapentin  Continue to follow

## 2019-07-10 NOTE — DISCHARGE PLANNING
Received Choice form at 6178  Agency/Facility Name: [facility]  Referral sent per Choice form @ [phone]

## 2019-07-10 NOTE — H&P
Hospital Medicine History and Physical    Date of Service  7/9/2019    Chief Complaint  Chief Complaint   Patient presents with   • Diarrhea     X 3 days, this pt has a strong medical history, she appears alert but does not know why she has this very runny diarrhea, denies antibiotic use. All started with abd cramping       History of Presenting Illness  74 y.o. female who presented 7/9/2019 with chronic failure to thrive who is on hospice at home. She had constipation and was treated with aggressive laxatives and the last 3 days has had watery diarrhea. She complains of stomach cramps and pain. She is currently confused and has history of delirium induced by narcotics. Review of systems is not able to be obtained otherwise due to confusion.    Primary Care Physician  Javon Reeves M.D.    Code Status  DNR    Review of Systems  Review of Systems   Unable to perform ROS: Mental status change   Respiratory: Negative for cough and shortness of breath.    Cardiovascular: Negative for chest pain.   Gastrointestinal: Positive for abdominal pain and diarrhea. Negative for blood in stool, nausea and vomiting.   Skin: Negative for rash.   Neurological: Negative for headaches.          Past Medical History  Past Medical History:   Diagnosis Date   • Cardiomyopathy (HCC) 01/06/2015    Idiopathic with normal coronary arteries on cath. June 2018: Echocardiogram with normal LV size, mild concentric LVH, LVEF 50%. Mild TR. RVSP 35mmHg.   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 Reddy   • COPD (chronic obstructive pulmonary disease) (HCC)    • E. coli sepsis (HCC)     Right buttock pressure wound   • Full dentures    • Gastritis    •  Hypertension     • Hypokalemia     uncertain cause maintained with potassium replacement.   • Hypothyroidism    • LBBB (left bundle branch block)    • Lumbar disc disease with radiculopathy    • MSSA (methicillin susceptible Staphylococcus aureus)     Vaginal and buttock   • Osteoarthritis     Spine and knees and jaw   • Protein C deficiency (HCC)    • Protein S deficiency (HCC)    • Pulmonary embolism (HCC)     6 Times.   • Pulmonary hypertension (HCC)    • Renal atrophy, right    • Rheumatoid arthritis(714.0)     historical diagnosis   • S/P insertion of IVC (inferior vena caval) filter     Prior to 2000, records not available, not retrievable.   • Seizure (HCC)     As a child   • Sleep apnea     Witnessed in hospital needs work up   • Systolic and diastolic CHF, chronic (HCC)    • TMJ (temporomandibular joint syndrome)        Surgical History  Past Surgical History:   Procedure Laterality Date   • VAMSHI BY LAPAROSCOPY  8/27/2014    Performed by Ajith Kearney M.D. at [facility]   • GASTROSCOPY WITH BIOPSY  12/28/2011    Performed by RAD CANTU at [facility]   • EGD W/ENDOSCOPIC ULTRASOUND  12/28/2011    Performed by RAD CANTU at [facility]   • ERCP  12/28/2011    Performed by RAD CANTU at [facility]   • RECOVERY  12/8/2010    Performed by TERRELL MCKENNA at [facility]   • KNEE REPLACEMENT, TOTAL  2009    left   • ULCER DEBRIDEMENT  6/5/08    Performed by ARIELLE MITCHELL at [facility]   • FLAP CLOSURE  6/5/08    Performed by ARIELLE MITCHELL at [facility]   • LUMBAR FUSION POSTERIOR  1998   • ABDOMINAL HYSTERECTOMY TOTAL  1970's   • ELBOW ORIF  1950    right   • BURSA EXCISION      bursa sac removed bilat hips   • CATARACT EXTRACTION WITH IOL Bilateral    • COLONOSCOPY  2001, 6/2010    normal   • KNEE ARTHROSCOPY      left   • MASTECTOMY      left   • OTHER ORTHOPEDIC SURGERY       BILAT FOOT   • TONSILLECTOMY         Medications    Current Facility-Administered Medications:   •  Special Contact Isolation, , , CONTINUOUS **AND** C Diff by PCR rflx Toxin, , , Once **AND** Pharmacy Consult Request, 1 Each, Other, PHARMACY TO DOSE, Quincy Larsen M.D.  •  albuterol inhaler 2 Puff, 2 Puff, Inhalation, Q6HRS PRN, Jasmyn Richardson M.D.  •  ALPRAZolam (XANAX) tablet 1 mg, 1 mg, Oral, TID PRN, Jasmyn Richardson M.D.  •  [START ON 7/10/2019] ammonium lactate (LAC-HYDRIN) 12 % lotion 1 Application, 1 Application, Topical, DAILY, Jasmyn Richardson M.D.  •  [START ON 7/10/2019] bisoprolol (ZEBETA) 10 MG 2.5 mg, 2.5 mg, Oral, DAILY, Jasmyn Richardson M.D.  •  budesonide-formoterol (SYMBICORT) 160-4.5 MCG/ACT inhaler 2 Puff, 2 Puff, Inhalation, BID, Jasmyn Richardson M.D.  •  buPROPion SR (WELLBUTRIN-SR) tablet 150 mg, 150 mg, Oral, BID, Jasmyn Richardson M.D.  •  [START ON 7/10/2019] levothyroxine (SYNTHROID) tablet 125 mcg, 125 mcg, Oral, AM ES, Jasmyn Richardson M.D.  •  lisinopril (PRINIVIL) tablet 5 mg, 5 mg, Oral, BID, Jasmyn Richardson M.D.  •  tolterodine ER (DETROL LA) capsule 4 mg, 4 mg, Oral, Q EVENING, Jasmyn Richardson M.D.  •  NS infusion 2,000 mL, 2,000 mL, Intravenous, Continuous, Jasmyn Richardson M.D.  •  heparin injection 5,000 Units, 5,000 Units, Subcutaneous, Q8HRS, Jasmyn Richardson M.D.  •  acetaminophen (TYLENOL) tablet 650 mg, 650 mg, Oral, Q6HRS PRN, Jasmyn Richardson M.D.  •  ondansetron (ZOFRAN) syringe/vial injection 4 mg, 4 mg, Intravenous, Q4HRS PRN, Jasmyn Richardson M.D.  •  ondansetron (ZOFRAN ODT) dispertab 4 mg, 4 mg, Oral, Q4HRS PRN, Jasmyn Richardson M.D.  •  [START ON 7/10/2019] vancomycin 50 mg/ml (FIRST-VANCOMYCIN) oral solution 125 mg, 125 mg, Oral, Q6HRS, Jasmyn Richardson M.D.  •  methadone (DOLOPHINE) tablet 5 mg, 5 mg, Oral, Q6HRS PRN, Jasmyn Richardson M.D.    Current Outpatient Prescriptions:   •  ammonium lactate (LAC-HYDRIN) 12 % Lotion, Apply 1 Application to affected area(s)  every day., Disp: , Rfl:   •  budesonide-formoterol (SYMBICORT) 160-4.5 MCG/ACT Aerosol, Inhale 2 Puffs by mouth 2 Times a Day., Disp: , Rfl:   •  esomeprazole (NEXIUM) 40 MG delayed-release capsule, Take 40 mg by mouth as needed., Disp: , Rfl:   •  tolterodine ER (DETROL LA) 4 MG CAPSULE SR 24 HR, Take 4 mg by mouth every evening., Disp: , Rfl:   •  traZODone (DESYREL) 100 MG Tab, Take 200 mg by mouth every evening., Disp: , Rfl:   •  amoxicillin (AMOXIL) 500 MG Cap, Take 500 mg by mouth 2 times a day. Pt reports that she had a old prescription started on 7/4/2019 for 3 days, Disp: , Rfl:   •  methadone (DOLOPHINE) 5 MG Tab, Take 5 mg by mouth every 6 hours., Disp: , Rfl:   •  ALPRAZolam (XANAX) 1 MG Tab, Take 1 mg by mouth 3 times a day as needed for Sleep or Anxiety., Disp: , Rfl:   •  albuterol (PROAIR HFA) 108 (90 Base) MCG/ACT Aero Soln inhalation aerosol, Inhale 2 Puffs by mouth every 6 hours as needed for Shortness of Breath., Disp: 8.5 g, Rfl: 6  •  bisoprolol (ZEBETA) 5 MG Tab, Take 0.5 Tabs by mouth every day., Disp: 45 Tab, Rfl: 3  •  warfarin (COUMADIN) 3 MG Tab, Take 2 Tabs by mouth every day., Disp: 90 Tab, Rfl: 6  •  levothyroxine (SYNTHROID) 125 MCG Tab, Take 1 Tab by mouth Every morning on an empty stomach., Disp: 30 Tab, Rfl: 8  •  buPROPion (WELLBUTRIN XL) 300 MG XL tablet, Take 1 Tab by mouth every morning., Disp: 30 Tab, Rfl: 11  •  lisinopril (PRINIVIL) 5 MG Tab, Take 1 Tab by mouth 2 times a day., Disp: 60 Tab, Rfl: 11  •  docusate sodium (COLACE) 100 MG CAPS, Take 200 mg by mouth 2 times a day., Disp: , Rfl:   •  sennosides (SENOKOT) 8.6 MG TABS, Take 8.6 mg by mouth 1 time daily as needed., Disp: , Rfl:     Family History  Family History   Problem Relation Age of Onset   • Heart Disease Sister    • Heart Disease Mother    • Heart Disease Father    • Cancer Father         esophageal   • Non-contributory Brother         copd, cirrhosis, alzheimer's mild   • Heart Disease Brother         3 MIs  "  • GI Sister         colon polyps, precancerous   • Cancer Sister         breast X 2       Social History  Social History   Substance Use Topics   • Smoking status: Former Smoker     Packs/day: 1.50     Years: 40.00     Types: Cigarettes     Quit date: 1972   • Smokeless tobacco: Former User   • Alcohol use No      Comment: none since        Allergies  Allergies   Allergen Reactions   • Beta Adrenergic Blockers      depression   • Atorvastatin      \"I fell and hit my head but it could've been the other medications that I was taken.\"   • Ezetimibe      \"I fell and hit my head but it could've been the other medications that I was taken.\"   • Metoprolol      \"I fell and hit my head but it could've been the other medications that I was taken.\"     • Other Environmental    • Pcn [Penicillins]      Per medics   • Spironolactone      Abrupt renal failure   • Statins [Hmg-Coa-R Inhibitors]      \"I fell and hit my head but it could've been the other medications that I was taken.\"   • Tape Hives and Rash     Plastic tape        Physical Exam  Laboratory   Hemodynamics  Temp (24hrs), Av.2 °C (99 °F), Min:37.2 °C (99 °F), Max:37.2 °C (99 °F)   Temperature: 37.2 °C (99 °F)  Pulse  Av  Min: 76  Max: 76 Heart Rate (Monitored): 84  Blood Pressure : (!) 166/87, NIBP: (!) 170/75      Respiratory      Respiration: (!) 23, Pulse Oximetry: 96 %             Physical Exam   Constitutional: She appears cachectic. No distress.   Eyes: Conjunctivae are normal. No scleral icterus.   Neck: Neck supple.   Cardiovascular: Normal rate and regular rhythm.    Pulmonary/Chest: Effort normal and breath sounds normal.   Abdominal: Soft. Bowel sounds are normal. There is no tenderness. There is no rebound and no guarding.   Neurological: She is alert.   Skin: Skin is warm and dry. No rash noted.   Psychiatric: Her behavior is normal. Her speech is delayed. She exhibits abnormal recent memory.   Nursing note and vitals reviewed.      " Recent Labs      07/09/19   1700   WBC  11.9*   RBC  3.89*   HEMOGLOBIN  11.2*   HEMATOCRIT  35.5*   MCV  91.3   MCH  28.8   MCHC  31.5*   RDW  48.0   PLATELETCT  317   MPV  10.1     Recent Labs      07/09/19   1700   SODIUM  141   POTASSIUM  3.5*   CHLORIDE  107   CO2  22   GLUCOSE  101*   BUN  35*   CREATININE  1.56*   CALCIUM  8.7     Recent Labs      07/09/19   1700   ALTSGPT  20   ASTSGOT  26   ALKPHOSPHAT  57   TBILIRUBIN  1.3   LIPASE  30   GLUCOSE  101*     Recent Labs      07/09/19   1700   INR  1.10               Imaging  CT abd/pelvis shows no acute pathology   Assessment/Plan     I anticipate this patient is appropriate for observation status at this time.    CKD (chronic kidney disease) stage 3, GFR 30-59 ml/min ([facility])- (present on admission)   Assessment & Plan    Creatinine worse than baseline due to dehydration, will recheck after fluids given     Diarrhea due to drug   Assessment & Plan    Patient admits to taking multiple laxatives over the last three months due to severe constipation and over the last 3 days has had watery diarrhea with cramping and abdominal discomfort  Stop laxatives, check c. dif     Opioid dependence with delirium ([facility])- (present on admission)   Assessment & Plan    Hold methadone due to confusion and will give only as needed  Patient on hospice at home, will likely resume on discharge but will need to educate family better on what to expect and how to handle situations         VTE prophylaxis: none.

## 2019-07-10 NOTE — DIETARY
"Nutrition services: Day 0 of admit.  Dian Mendoza is a 74 y.o. female with admitting DX of Chronic Kidney Disease.   Consult received for poor po and unintended weight loss.     Visited pt at bedside. Pt was alert and participated in conversation throughout encounter. Pt seemed confused and often diverted her answers during nutrition related questioning. Pt reports her UBW to be 135-140#. Pt unsure of current weight loss but notes that she knows that she has lost some. Current documented weight 168# (76.5 kg) Pt states that she ate her lunch and would like to continue receiving vegetarian foods. RD reviewed vegetarian menu options with pt. Pt did not express any further nutrition related questions at this time.     Assessment:  Height: 177.8 cm (5' 10\")  Weight: 76.5 kg (168 lb 10.4 oz) via bed scale   Body mass index is 24.2 kg/m².   BMI classification: normal weight   Diet/Intake: Regular-vegetarian/50-75%    Evaluation:   1. Past medical hx of chronic pain and debility who has been on home hospice.   2. Pt poor historian, unable to recall information or follow along with questions asked during encounter  3. Per MD note, pt has been on pain meds that had been diverted to son, who until recently was her primary caregiver   4. Pt with possible d/c to [facility]   5. Labs: CO2 19, BUN 24  6. Meds: NS Infusion, Zebeta, Prinivil, Mycostatin, Ditropan    Malnutrition Risk: Pt with poor po and weight loss on nutritional admit screen. Pt with unclear weight loss hx and duration of poor po. Pt at risk for malnutrition but unable to further assess at this time.    Recommendations/Plan:  1. Obtain supplement order as needed.   2. Encourage intake of meals.   3. Document intake of all meals as % taken in ADL's to provide interdisciplinary communication across all shifts.   4. Monitor weight.  5. Nutrition rep will continue to see patient for ongoing meal and snack preferences.     RD following.             "

## 2019-07-10 NOTE — PROGRESS NOTES
Patient a+o x 3, reoriented to event, forgetful, denies sob/lightheadedness/numbness/tingling/dizziness/chest pain/n/v/d. C/o 10/10 pain with movement to LLE, generalized edema to ble's, left knee swollen. Patient repositioned q 2 hours and incontinent care provided prn. No bm since preadmission, wctm. Tolerating diet. Fall precautions/hourly rounding maintained, call light within reach and functioning, all items within reach, bed alarm active.  Patient encouraged to call for assistance, poc reviewed with patient - reinforcement required throughout shift, ?'s/concerns answered.

## 2019-07-10 NOTE — PROGRESS NOTES
Assumed care of pt. Confused to place and time. Skin check performed via 2 rns. Noted numerous red nonblanchable areas to buttock. Noted nonblanchable are to left great toe and 2nd toe is hard, crusty and brown-black on tip of toe. Right great toe and under great toe pt has hard yellow callus skin,nonblanching, inner 2nd toe hard and calluse.noted dinah feet deformed due to what appears to be hammer toe. Left elbow red and nonblanchable. Right elbow clear and intact. Skin has numerous bruises from lab draw. dinah feet plus 2 pitting edema. No weeping noted. Med compliant. Iv infusing fluids. Tolerating well. Safety maintained, bed alarm on. No loose stools at this time. Awaiting stool specimen. Isolation maintained. incontinent x2.

## 2019-07-10 NOTE — PROGRESS NOTES
Hospital Medicine Daily Progress Note    Date of Service  7/10/2019    Chief Complaint  74 y.o. female admitted 7/9/2019 with diarrhea    Hospital Course    Patient is a 74 year old with an extensive medical history including chronic pain and debility who has been on home hospice.  Reportedly she received multiple laxative for treatment of constipation and was brought in with subsequent diarrhea, dehydration and worsening confusion.        Interval Problem Update  She reports chronic Left knee pain with movement  She is axox2, a very poor historian, per nursing her care giver was her son but he was incarcerated yesterday.  She has also reportedly on prn methadone - calls to [facility] have been placed to clarify care plan and medications  No further diarrhea today  No sob  Ros otherwise negative    Consultants/Specialty  Hospice    Code Status  DNR/ DNI    Disposition  tbd    Review of Systems  Review of Systems   Unable to perform ROS: Critical illness   Constitutional: Negative.    HENT: Negative.    Eyes: Negative.  Negative for blurred vision.   Respiratory: Negative.  Negative for cough and shortness of breath.    Cardiovascular: Negative.  Negative for chest pain, palpitations and leg swelling.   Gastrointestinal: Negative.  Negative for abdominal pain, nausea and vomiting.   Genitourinary: Negative.  Negative for dysuria.   Musculoskeletal: Positive for joint pain. Negative for myalgias.   Skin: Negative.  Negative for itching and rash.   Neurological: Negative.  Negative for dizziness, focal weakness and headaches.   Endo/Heme/Allergies: Negative.  Does not bruise/bleed easily.   Psychiatric/Behavioral: Negative.  Negative for depression, substance abuse and suicidal ideas.   All other systems reviewed and are negative.       Physical Exam  Temp:  [36.4 °C (97.6 °F)-37.2 °C (99 °F)] 36.6 °C (97.8 °F)  Pulse:  [64-76] 73  Resp:  [17-23] 18  BP: (135-166)/(54-87) 135/64  SpO2:  [94 %-97 %] 94  %    Physical Exam   Constitutional: She is oriented to person, place, and time. She appears well-developed and well-nourished. No distress.   HENT:   Head: Normocephalic and atraumatic.   Mouth/Throat: Oropharynx is clear and moist.   Eyes: Conjunctivae are normal.   Neck: Normal range of motion. Neck supple.   Cardiovascular: Normal rate, normal heart sounds and intact distal pulses.  Exam reveals no gallop and no friction rub.    No murmur heard.  Pulmonary/Chest: Effort normal and breath sounds normal. No respiratory distress. She has no wheezes. She has no rales. She exhibits no tenderness.   Abdominal: Soft. Bowel sounds are normal. She exhibits no distension and no mass. There is no tenderness. There is no rebound and no guarding.   Musculoskeletal: Normal range of motion. She exhibits no edema or tenderness.   L knee swelling, full rom, well healed surgical scars   Neurological: She is alert and oriented to person, place, and time. She has normal reflexes. No cranial nerve deficit. She exhibits normal muscle tone. Coordination normal.   Poor historian, axox2   Skin: Skin is warm and dry. No rash noted. She is not diaphoretic. No erythema. No pallor.   Nursing note and vitals reviewed.      Fluids    Intake/Output Summary (Last 24 hours) at 07/10/19 1149  Last data filed at 07/10/19 0745   Gross per 24 hour   Intake                0 ml   Output              200 ml   Net             -200 ml       Laboratory  Recent Labs      07/09/19   1700  07/10/19   0651   WBC  11.9*  10.9*   RBC  3.89*  3.43*   HEMOGLOBIN  11.2*  10.0*   HEMATOCRIT  35.5*  32.4*   MCV  91.3  94.5   MCH  28.8  29.2   MCHC  31.5*  30.9*   RDW  48.0  49.8   PLATELETCT  317  263   MPV  10.1  10.0     Recent Labs      07/09/19   1700  07/10/19   0651   SODIUM  141  139   POTASSIUM  3.5*  3.7   CHLORIDE  107  112   CO2  22  19*   GLUCOSE  101*  84   BUN  35*  24*   CREATININE  1.56*  1.13   CALCIUM  8.7  7.9*     Recent Labs      07/09/19   1700    INR  1.10               Imaging  DX-KNEE 3 VIEWS LEFT   Final Result         1.  No acute traumatic bony injury.   2.  Atherosclerosis      CT-ABDOMEN-PELVIS W/O   Final Result            1. No acute abnormality.   2. Prior cholecystectomy.   3. Additional findings as detailed.                      Assessment/Plan  CKD (chronic kidney disease) stage 3, GFR 30-59 ml/min ([facility])- (present on admission)   Assessment & Plan    Improved with fluids  Continue to follow     Diarrhea due to drug   Assessment & Plan    Resolved  Likely due to laxatives     Opioid dependence with delirium ([facility])- (present on admission)   Assessment & Plan    Hospice consult and communication pending  Need to clarify prn methadone dosing - this is risky - will schedule low dose only at this time once daily  Continue to follow closely        Pt was reportedly taken off of methadone in May, and had been on both oral liquid morphine and then later MS contin, has not been on benzos for several months.  Reportedly there were concerns that her pain meds were being diverted by her son, it's unclear what she has been taking - will stop benzos and methadone - start low dose oral morphine and titrate prn  VTE prophylaxis: heparin

## 2019-07-10 NOTE — WOUND TEAM
"[facility]  Inpatient Services  Initial Wound and Skin Care Evaluation    Admission Date:  7/9/2019   HPI, PMH, SH: Reviewed  Unit where seen by Wound Team: 2208/00    WOUND CONSULT RELATED TO: pressure injury mgmt    SUBJECTIVE:  \"I can't turn my knee hurts too much. I don't want pillows or boots because of it.\"    Self Report / Pain Level: c/o pain to L knee      OBJECTIVE: on waffle overlay, L side lying  WOUND TYPE, LOCATION, CHARACTERISTICS (Pressure ulcers: location, stage, POA or date identified)    Wound 07/09/19 Partial Thickness Wound Buttocks;Coccyx;Sacrum (Active)         Site Assessment Red;Pink    Tiff-wound Assessment Intact    Margins Defined edges;Attached edges    Wound Length (cm) 7.5 cm 7/10/2019 10:15 AM   Wound Width (cm) 7.5 cm 7/10/2019 10:15 AM   Wound Depth (cm) 0.1 cm 7/10/2019 10:15 AM   Wound Surface Area (cm^2) 56.25 cm^2 7/10/2019 10:15 AM   Tunneling 0 cm 7/10/2019 10:15 AM   Undermining 0 cm 7/10/2019 10:15 AM   Drainage Amount None    Treatments Cleansed;Moisturizing cream    Periwound Protectant Skin Protectant wipes to Periwound;Barrier Paste    Dressing Options Open to Air    NEXT Weekly Photo (Inpatient Only) 07/17/19    Odor None     Exposed Structures None     Tissue Type and Percentage 100% red/pink      Vascular:  Wounds not r/t vascular problem    Lab Values:    WBC:       WBC   Date/Time Value Ref Range Status   07/10/2019 06:51 AM 10.9 (H) 4.8 - 10.8 K/uL Final   10/17/2012 09:40 AM 6.4 4.0 - 10.5 x10E3/uL Final     AIC:      Lab Results   Component Value Date/Time    HBA1C 5.5 10/04/2018 11:26 PM          Culture:  na    INTERVENTIONS BY WOUND TEAM: assessed elbows and both feet. pt turned to L side, assessed sacrococcygeal and buttocks, there is partial thickness wounds from friction, inside gluteal cleft there is redness. Barrier cream in use. Pt then placed supine. Offered to float heels, pt declined. Discussed possibility of getting pressure injuries " r/t heels pressing on bed. She still refused, started crying about pain. RN is in room and aware.   Dressing Options: Open to Air    Interdisciplinary consultation:   With Nursing;With Patient     EVALUATION:pt has partial thickness wounds to sacrococcygeal buttocks, possibly from friction and incontinence. L 2nd toe with callus with small brown center r/t to deformity of toes. Left intact since no s/s of infection.     Factors affecting wound healing: CKD, opioid dependence, decreased nutrition, rheumatoid arthritis  Goals:  Decrease in wound area and depth weekly       NURSING PLAN OF CARE ORDERS (X):    Dressing changes: See Dressing Care orders:     Skin care: See Skin Care orders: x  Rectal tube care: See Rectal Tube Care orders:   Other orders:  Nystatin order for pannus    RSKIN: CURRENT (X) ORDERED (O)  Q shift Jack:  X  Q shift pressure point assessments:  X  Pressure redistribution mattress  x          DAVID          Bariatric DAVID        Bariatric foam           Heel float boots       pt refusing r/t pain in knees   Float Heels off Bed with Pillows  pt refusing r/t pain in knees          Barrier wipes         Barrier Cream         Barrier paste        Sacral silicone dressing         Silicone O2 tubing         Anchorfast         Cannula fixation Device (Tender )          Gray Foam Ear protectors           Trach with split foam               Waffle cushion        Waffle Overlay    x     Rectal tube or BMS         Antifungal tx   Interdry         Reposition q 2 hours    x   Up to chair        Ambulate      PT/OT        Dietician        Diabetes Education      PO x   TF     TPN     NPO   # days   Other     WOUND TEAM PLAN OF CARE (X):   NPWT change 3 x week:        Dressing changes by wound team:       Follow up as needed:   If wounds worsen    Other (explain):    Anticipated discharge plans (X):  SNF:           Home Care:           Outpatient Wound Center:            Self Care:            Other:    Home  hospice

## 2019-07-10 NOTE — ED PROVIDER NOTES
ED Provider Note    CHIEF COMPLAINT  Chief Complaint   Patient presents with   • Diarrhea     X 3 days, this pt has a strong medical history, she appears alert but does not know why she has this very runny diarrhea, denies antibiotic use. All started with abd cramping       HPI  Dian Mendoza is a 74 y.o. female who presents severe abdominal pain described as sharp stabbing or cramping across the abdomen from the right to left.  Patient states this started 4 days ago she is also had associated very runny and foul-smelling diarrhea.  Patient states she has had nausea but no vomiting.  She is also had dysuria.  She stated that the dysuria started first.  She denies fever, chills, vomiting, blood in her stool or melena.  History is limited secondary patient being somewhat confused.  Patient is unsure if she is been on antibiotics recently.    REVIEW OF SYSTEMS  Pertinent positives include diarrhea, abdominal pain, dysuria, nausea. Pertinent negatives include vomiting, fever, chills, chest pain, cough, melena, hematochezia. All other systems negative.    PAST MEDICAL HISTORY   has a past medical history of Anemia; Atrial tachycardia, paroxysmal ([facility]); Cancer of left breast ([facility]) (1997); Cardiomyopathy ([facility]) (01/06/2015); Cholelithiasis (2014); COPD (chronic obstructive pulmonary disease) ([facility]); E. 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    SOCIAL  HISTORY  Social History     Social History Main Topics   • Smoking status: Former Smoker     Packs/day: 1.50     Years: 40.00     Types: Cigarettes     Quit date: 1/1/1972   • Smokeless tobacco: Former User   • Alcohol use No      Comment: none since 2007   • Drug use: No   • Sexual activity: Not Currently       SURGICAL HISTORY   has a past surgical history that includes ulcer debridement (6/5/08); flap closure (6/5/08); tonsillectomy; knee arthroscopy; other orthopedic surgery; bursa excision; mastectomy; knee replacement, total (2009); lumbar fusion posterior (1998); abdominal hysterectomy total (1970's); elbow orif (1950); recovery (12/8/2010); gastroscopy with biopsy (12/28/2011); egd w/endoscopic ultrasound (12/28/2011); ercp (12/28/2011); colonoscopy (2001, 6/2010); victoriano by laparoscopy (8/27/2014); and cataract extraction with iol (Bilateral).    CURRENT MEDICATIONS  Home Medications     Reviewed by Akila Laboy R.N. (Registered Nurse) on 07/09/19 at 2216  Med List Status: Complete   Medication Last Dose Status   albuterol (PROAIR HFA) 108 (90 Base) MCG/ACT Aero Soln inhalation aerosol 7/9/2019 Active   ALPRAZolam (XANAX) 1 MG Tab 7/8/2019 Active   ammonium lactate (LAC-HYDRIN) 12 % Lotion 7/9/2019 Active   amoxicillin (AMOXIL) 500 MG Cap > 3 days Active   bisoprolol (ZEBETA) 5 MG Tab 7/9/2019 Active   budesonide-formoterol (SYMBICORT) 160-4.5 MCG/ACT Aerosol 7/9/2019 Active   buPROPion (WELLBUTRIN XL) 300 MG XL tablet 7/9/2019 Active   docusate sodium (COLACE) 100 MG CAPS 7/9/2019 Active   esomeprazole (NEXIUM) 40 MG delayed-release capsule > 1 week Active   levothyroxine (SYNTHROID) 125 MCG Tab 7/9/2019 Active   lisinopril (PRINIVIL) 5 MG Tab 7/9/2019 Active   methadone (DOLOPHINE) 5 MG Tab 7/9/2019 Active   sennosides (SENOKOT) 8.6 MG TABS > 1 week Active   tolterodine ER (DETROL LA) 4 MG CAPSULE SR 24 HR 7/8/2019 Active   traZODone (DESYREL) 100 MG Tab 7/8/2019 Active   warfarin (COUMADIN) 3 MG Tab  "7/8/2019 Active                ALLERGIES  Allergies   Allergen Reactions   • Beta Adrenergic Blockers      depression   • Atorvastatin      \"I fell and hit my head but it could've been the other medications that I was taken.\"   • Ezetimibe      \"I fell and hit my head but it could've been the other medications that I was taken.\"   • Metoprolol      \"I fell and hit my head but it could've been the other medications that I was taken.\"     • Other Environmental    • Pcn [Penicillins]      Per medics   • Spironolactone      Abrupt renal failure   • Statins [Hmg-Coa-R Inhibitors]      \"I fell and hit my head but it could've been the other medications that I was taken.\"   • Tape Hives and Rash     Plastic tape       PHYSICAL EXAM  VITAL SIGNS: /81   Pulse 65   Temp 36.4 °C (97.6 °F) (Temporal)   Resp 20   Ht 1.778 m (5' 10\")   Wt 76.5 kg (168 lb 10.4 oz)   SpO2 97%   BMI 24.20 kg/m²   Constitutional: Well developed, Well nourished, mild distress.   HENT: Normocephalic, Atraumatic, slightly dry, No oral exudates.   Eyes: Conjunctiva normal, No discharge.   Cardiovascular: Normal heart rate, Normal rhythm, No murmurs, equal pulses.   Pulmonary: Normal breath sounds, No respiratory distress, No wheezing, No rales, No rhonchi.  Chest: No chest wall tenderness or deformity.   Abdomen:Soft, tenderness over the left lower quadrant, No masses, no rebound, no guarding.   Musculoskeletal: Patient has some tenderness of the right knee.  There is no joint laxity.  2+ pitting edema bilaterally  skin: Warm, Dry, No erythema, No rash.   Neurologic: Alert & oriented x 3, Normal motor function,  No focal deficits noted.   Psychiatric: Affect normal, Judgment normal, Mood normal.           RADIOLOGY/PROCEDURES  DX-KNEE 3 VIEWS LEFT   Final Result         1.  No acute traumatic bony injury.   2.  Atherosclerosis      CT-ABDOMEN-PELVIS W/O   Final Result            1. No acute abnormality.   2. Prior cholecystectomy.   3. " Additional findings as detailed.                   Laboratory tests  Results for orders placed or performed during the hospital encounter of 07/09/19   CBC WITH DIFFERENTIAL   Result Value Ref Range    WBC 11.9 (H) 4.8 - 10.8 K/uL    RBC 3.89 (L) 4.20 - 5.40 M/uL    Hemoglobin 11.2 (L) 12.0 - 16.0 g/dL    Hematocrit 35.5 (L) 37.0 - 47.0 %    MCV 91.3 81.4 - 97.8 fL    MCH 28.8 27.0 - 33.0 pg    MCHC 31.5 (L) 33.6 - 35.0 g/dL    RDW 48.0 35.9 - 50.0 fL    Platelet Count 317 164 - 446 K/uL    MPV 10.1 9.0 - 12.9 fL    Neutrophils-Polys 76.10 (H) 44.00 - 72.00 %    Lymphocytes 14.10 (L) 22.00 - 41.00 %    Monocytes 8.30 0.00 - 13.40 %    Eosinophils 0.90 0.00 - 6.90 %    Basophils 0.30 0.00 - 1.80 %    Immature Granulocytes 0.30 0.00 - 0.90 %    Nucleated RBC 0.00 /100 WBC    Neutrophils (Absolute) 9.09 (H) 2.00 - 7.15 K/uL    Lymphs (Absolute) 1.68 1.00 - 4.80 K/uL    Monos (Absolute) 0.99 (H) 0.00 - 0.85 K/uL    Eos (Absolute) 0.11 0.00 - 0.51 K/uL    Baso (Absolute) 0.03 0.00 - 0.12 K/uL    Immature Granulocytes (abs) 0.04 0.00 - 0.11 K/uL    NRBC (Absolute) 0.00 K/uL   COMP METABOLIC PANEL   Result Value Ref Range    Sodium 141 135 - 145 mmol/L    Potassium 3.5 (L) 3.6 - 5.5 mmol/L    Chloride 107 96 - 112 mmol/L    Co2 22 20 - 33 mmol/L    Anion Gap 12.0 (H) 0.0 - 11.9    Glucose 101 (H) 65 - 99 mg/dL    Bun 35 (H) 8 - 22 mg/dL    Creatinine 1.56 (H) 0.50 - 1.40 mg/dL    Calcium 8.7 8.4 - 10.2 mg/dL    AST(SGOT) 26 12 - 45 U/L    ALT(SGPT) 20 2 - 50 U/L    Alkaline Phosphatase 57 30 - 99 U/L    Total Bilirubin 1.3 0.1 - 1.5 mg/dL    Albumin 3.9 3.2 - 4.9 g/dL    Total Protein 7.4 6.0 - 8.2 g/dL    Globulin 3.5 1.9 - 3.5 g/dL    A-G Ratio 1.1 g/dL   LIPASE   Result Value Ref Range    Lipase 30 7 - 58 U/L   Prothrombin Time   Result Value Ref Range    PT 14.4 12.0 - 14.6 sec    INR 1.10 0.87 - 1.13   ESTIMATED GFR   Result Value Ref Range    GFR If  39 (A) >60 mL/min/1.73 m 2    GFR If Non African  American 32 (A) >60 mL/min/1.73 m 2         Medications given in the ER  Medications   Pharmacy Consult Request (not administered)   albuterol inhaler 2 Puff (not administered)   ALPRAZolam (XANAX) tablet 1 mg (not administered)   ammonium lactate (LAC-HYDRIN) 12 % lotion 1 Application (not administered)   bisoprolol (ZEBETA) 10 MG 2.5 mg (not administered)   budesonide-formoterol (SYMBICORT) 160-4.5 MCG/ACT inhaler 2 Puff (2 Puffs Inhalation Given 7/9/19 2345)   buPROPion SR (WELLBUTRIN-SR) tablet 150 mg ( Oral Automatically Held 7/16/19 1800)   levothyroxine (SYNTHROID) tablet 125 mcg (not administered)   lisinopril (PRINIVIL) tablet 5 mg (5 mg Oral Given 7/9/19 2345)   NS infusion 2,000 mL (2,000 mL Intravenous New Bag 7/9/19 2145)   heparin injection 5,000 Units (5,000 Units Subcutaneous Given 7/9/19 2345)   acetaminophen (TYLENOL) tablet 650 mg (not administered)   ondansetron (ZOFRAN) syringe/vial injection 4 mg (not administered)   ondansetron (ZOFRAN ODT) dispertab 4 mg (not administered)   vancomycin 50 mg/ml (FIRST-VANCOMYCIN) oral solution 125 mg (125 mg Oral Given 7/10/19 0001)   methadone (DOLOPHINE) tablet 5 mg (not administered)   oxybutynin SR (DITROPAN-XL) tablet 10 mg (not administered)   dicyclomine (BENTYL) injection 20 mg (20 mg Intramuscular Given 7/9/19 1825)   NS infusion 1,000 mL (0 mL Intravenous Stopped 7/9/19 1900)   morphine (pf) 4 mg/ml injection 4 mg (4 mg Intravenous Given 7/9/19 1941)   iohexol (OMNIPAQUE) 240 mg/mL (25 mL Oral Given 7/9/19 1947)   FIRST-VANCOMYCIN 50 50 MG/ML PO SOLN (125 mg  Given 7/10/19 0004)       COURSE & MEDICAL DECISION MAKING  Pertinent Labs & Imaging studies reviewed. (See chart for details)  Concerned that the patient may have C. difficile, colitis, diverticulitis, electrolyte abnormality.  Because the patient had significant abdominal pain CT of the abdomen pelvis was ordered.      HYDRATION: Based on the patient's presentation of Acute Diarrhea the  patient was given IV fluids. IV Hydration was used because oral hydration was not as rapid as required. Upon recheck following hydration, the patient was [place] better with IV fluids.     Patient does show signs of acute kidney injury I think this is secondary to dehydration from volume loss.  CT of the abdomen and pelvis does not show any acute colitis but I am still concerned about C. difficile.  Patient will be admitted for further testing     The patient will return for new or worsening symptoms and is stable at the time of discharge.    The patient is referred to a primary physician for blood pressure management, diabetic screening, and for all other preventative health concerns.      DISPOSITION:  Patient will be admitted to Dr. Richardson in guarded condition.      FINAL IMPRESSION  1. Diarrhea, unspecified type    2. GAIL (acute kidney injury) (HCC)    3. Dehydration    4. Generalized abdominal pain    5. Altered mental status, unspecified altered mental status type               Electronically signed by: Quincy Larsen, 7/9/2019 5:13 PM    This record was made with a voice recognition software. The software is not perfect. I have tried to correct any grammar, spelling or context errors to the best of my ability, but errors may still remain. Interpretation of this chart should be taken in this context.

## 2019-07-10 NOTE — PROGRESS NOTES
Charge nurse to take pictures of buttock, dinah feet, and left elbow area. Pt resting quietly at this time.

## 2019-07-10 NOTE — CARE PLAN
Problem: Safety  Goal: Will remain free from injury  Outcome: PROGRESSING AS EXPECTED      Problem: Infection  Goal: Will remain free from infection  Outcome: PROGRESSING AS EXPECTED      Problem: Knowledge Deficit  Goal: Knowledge of disease process/condition, treatment plan, diagnostic tests, and medications will improve  Outcome: PROGRESSING SLOWER THAN EXPECTED

## 2019-07-10 NOTE — DISCHARGE PLANNING
Received Choice form at 2143   Agency/Facility [facility]: [facility]  Referral sent per Choice form @ [phone]

## 2019-07-10 NOTE — ASSESSMENT & PLAN NOTE
C dif negative  Likely due to laxatives  Now recurrence after miriaax - it is improving  following

## 2019-07-10 NOTE — DISCHARGE PLANNING
Care Transition Team Assessment    Patient lives at home with adult children support, however this child is reported as being in [facility] at this time. THIERRY met with this patient's son, Kehinde, bedside from [place].  Discharge plan was discussed.  Kehinde would like his mother placed in LTC with [facility]. hospitalist updated. THIERRY also provided Kehinde with group home list. Completed choice forms were faxed to PIYUSH Hartmann for referreral follow up.     Information Source  Orientation : Disoriented to Event  Information Given By: Patient    Elopement Risk  Legal Hold: No  Ambulatory or Self Mobile in Wheelchair: No-Not an Elopement Risk    Interdisciplinary Discharge Planning  Does Admitting Nurse Feel This Could be a Complex Discharge?: Yes  Primary Care Physician: Georgi  Lives with - Patient's Self Care Capacity: Adult Children (adult sons take turns living with her 1 month)  Patient or legal guardian wants to designate a caregiver (see row info): Yes  Caregiver name: Alexander Sanford  Caregiver relationship to patient: Son  Caregiver contact info: [phone]  Support Systems: Children, Family Member(s)  Housing / Facility: [address]  Do You Take your Prescribed Medications Regularly: Yes  Able to Return to Previous ADL's: Yes  Mobility Issues: Yes  Prior Services: None  Assistance Needed: No  Durable Medical Equipment: Other - Specify, Home Oxygen (wheelchair)    Discharge Preparedness  What is your plan after discharge?: Home with help  What are your discharge supports?: Child  Prior Functional Level: Needs Assist with Activities of Daily Living    Functional Assesment  Prior Functional Level: Needs Assist with Activities of Daily Living    Finances  Financial Barriers to Discharge: No  Prescription Coverage: Yes    Vision / Hearing Impairment  Vision Impairment : Yes  Right Eye Vision: Wears Glasses  Left Eye Vision: Wears Glasses  Hearing Impairment : No         Advance Directive  Advance Directive?: DPOA  for Health Care, POLST  Durable Power of  Name and Contact : Alexander    Domestic Abuse  Have you ever been the victim of abuse or violence?: No  Physical Abuse or Sexual Abuse: No  Verbal Abuse or Emotional Abuse: No  Possible Abuse Reported to:: Not Applicable         Discharge Risks or Barriers  Discharge risks or barriers?: No    Anticipated Discharge Information  Anticipated discharge disposition: Home, Hospice

## 2019-07-11 LAB
ANION GAP SERPL CALC-SCNC: 10 MMOL/L (ref 0–11.9)
BUN SERPL-MCNC: 21 MG/DL (ref 8–22)
CALCIUM SERPL-MCNC: 8 MG/DL (ref 8.4–10.2)
CHLORIDE SERPL-SCNC: 112 MMOL/L (ref 96–112)
CO2 SERPL-SCNC: 19 MMOL/L (ref 20–33)
CREAT SERPL-MCNC: 1.13 MG/DL (ref 0.5–1.4)
GLUCOSE SERPL-MCNC: 80 MG/DL (ref 65–99)
POTASSIUM SERPL-SCNC: 3.4 MMOL/L (ref 3.6–5.5)
SODIUM SERPL-SCNC: 141 MMOL/L (ref 135–145)

## 2019-07-11 PROCEDURE — 700105 HCHG RX REV CODE 258: Performed by: INTERNAL MEDICINE

## 2019-07-11 PROCEDURE — 80048 BASIC METABOLIC PNL TOTAL CA: CPT

## 2019-07-11 PROCEDURE — G0378 HOSPITAL OBSERVATION PER HR: HCPCS

## 2019-07-11 PROCEDURE — A9270 NON-COVERED ITEM OR SERVICE: HCPCS

## 2019-07-11 PROCEDURE — 99225 PR SUBSEQUENT OBSERVATION CARE,LEVEL II: CPT | Performed by: HOSPITALIST

## 2019-07-11 PROCEDURE — 700102 HCHG RX REV CODE 250 W/ 637 OVERRIDE(OP): Performed by: HOSPITALIST

## 2019-07-11 PROCEDURE — 700102 HCHG RX REV CODE 250 W/ 637 OVERRIDE(OP)

## 2019-07-11 PROCEDURE — 700102 HCHG RX REV CODE 250 W/ 637 OVERRIDE(OP): Performed by: INTERNAL MEDICINE

## 2019-07-11 PROCEDURE — A9270 NON-COVERED ITEM OR SERVICE: HCPCS | Performed by: HOSPITALIST

## 2019-07-11 PROCEDURE — 96372 THER/PROPH/DIAG INJ SC/IM: CPT

## 2019-07-11 PROCEDURE — 36415 COLL VENOUS BLD VENIPUNCTURE: CPT

## 2019-07-11 PROCEDURE — A9270 NON-COVERED ITEM OR SERVICE: HCPCS | Performed by: INTERNAL MEDICINE

## 2019-07-11 PROCEDURE — 700101 HCHG RX REV CODE 250: Performed by: HOSPITALIST

## 2019-07-11 PROCEDURE — 700111 HCHG RX REV CODE 636 W/ 250 OVERRIDE (IP): Performed by: INTERNAL MEDICINE

## 2019-07-11 PROCEDURE — 700101 HCHG RX REV CODE 250: Performed by: INTERNAL MEDICINE

## 2019-07-11 RX ORDER — POTASSIUM CHLORIDE 20 MEQ/1
20 TABLET, EXTENDED RELEASE ORAL ONCE
Status: ACTIVE | OUTPATIENT
Start: 2019-07-11 | End: 2019-07-12

## 2019-07-11 RX ORDER — AMOXICILLIN 250 MG
1 CAPSULE ORAL DAILY
Status: DISCONTINUED | OUTPATIENT
Start: 2019-07-11 | End: 2019-07-17 | Stop reason: HOSPADM

## 2019-07-11 RX ORDER — ACETAMINOPHEN 500 MG
1000 TABLET ORAL 3 TIMES DAILY
Status: DISCONTINUED | OUTPATIENT
Start: 2019-07-11 | End: 2019-07-13

## 2019-07-11 RX ORDER — AMOXICILLIN 250 MG
1 CAPSULE ORAL 2 TIMES DAILY PRN
Status: DISCONTINUED | OUTPATIENT
Start: 2019-07-11 | End: 2019-07-17 | Stop reason: HOSPADM

## 2019-07-11 RX ORDER — MORPHINE SULFATE 10 MG/5ML
10 SOLUTION ORAL 2 TIMES DAILY
Status: DISCONTINUED | OUTPATIENT
Start: 2019-07-11 | End: 2019-07-12

## 2019-07-11 RX ORDER — BUPROPION HYDROCHLORIDE 150 MG/1
150 TABLET, EXTENDED RELEASE ORAL 2 TIMES DAILY
Status: DISCONTINUED | OUTPATIENT
Start: 2019-07-11 | End: 2019-07-17 | Stop reason: HOSPADM

## 2019-07-11 RX ORDER — BISACODYL 10 MG
10 SUPPOSITORY, RECTAL RECTAL
Status: DISCONTINUED | OUTPATIENT
Start: 2019-07-11 | End: 2019-07-17 | Stop reason: HOSPADM

## 2019-07-11 RX ORDER — POTASSIUM CHLORIDE 20 MEQ/1
20 TABLET, EXTENDED RELEASE ORAL ONCE
Status: COMPLETED | OUTPATIENT
Start: 2019-07-11 | End: 2019-07-11

## 2019-07-11 RX ORDER — POLYETHYLENE GLYCOL 3350 17 G/17G
1 POWDER, FOR SOLUTION ORAL DAILY
Status: DISCONTINUED | OUTPATIENT
Start: 2019-07-11 | End: 2019-07-17 | Stop reason: HOSPADM

## 2019-07-11 RX ADMIN — HEPARIN SODIUM 5000 UNITS: 5000 INJECTION, SOLUTION INTRAVENOUS; SUBCUTANEOUS at 05:15

## 2019-07-11 RX ADMIN — POTASSIUM CHLORIDE 20 MEQ: 1500 TABLET, EXTENDED RELEASE ORAL at 13:28

## 2019-07-11 RX ADMIN — ACETAMINOPHEN 1000 MG: 500 TABLET, FILM COATED ORAL at 21:02

## 2019-07-11 RX ADMIN — LEVOTHYROXINE SODIUM 125 MCG: 125 TABLET ORAL at 05:13

## 2019-07-11 RX ADMIN — SODIUM CHLORIDE 1000 ML: 900 INJECTION, SOLUTION INTRAVENOUS at 00:17

## 2019-07-11 RX ADMIN — MORPHINE SULFATE 5 MG: 10 SOLUTION ORAL at 06:30

## 2019-07-11 RX ADMIN — BUPROPION HYDROCHLORIDE 150 MG: 150 TABLET, EXTENDED RELEASE ORAL at 21:10

## 2019-07-11 RX ADMIN — MORPHINE SULFATE 10 MG: 10 SOLUTION ORAL at 21:09

## 2019-07-11 RX ADMIN — LISINOPRIL 5 MG: 5 TABLET ORAL at 05:13

## 2019-07-11 RX ADMIN — NYSTATIN: 100000 POWDER TOPICAL at 21:03

## 2019-07-11 RX ADMIN — VANCOMYCIN 125 MG: KIT at 05:24

## 2019-07-11 RX ADMIN — BUDESONIDE AND FORMOTEROL FUMARATE DIHYDRATE 2 PUFF: 160; 4.5 AEROSOL RESPIRATORY (INHALATION) at 05:10

## 2019-07-11 RX ADMIN — LISINOPRIL 5 MG: 5 TABLET ORAL at 21:03

## 2019-07-11 RX ADMIN — BISOPROLOL FUMARATE 2.5 MG: 10 TABLET ORAL at 05:12

## 2019-07-11 RX ADMIN — AMMONIUM LACTATE 1 APPLICATION: 12 LOTION TOPICAL at 05:15

## 2019-07-11 RX ADMIN — ENOXAPARIN SODIUM 40 MG: 100 INJECTION SUBCUTANEOUS at 13:37

## 2019-07-11 RX ADMIN — ACETAMINOPHEN 1000 MG: 500 TABLET, FILM COATED ORAL at 13:28

## 2019-07-11 RX ADMIN — NYSTATIN: 100000 POWDER TOPICAL at 05:15

## 2019-07-11 ASSESSMENT — ENCOUNTER SYMPTOMS
BLURRED VISION: 0
RESPIRATORY NEGATIVE: 1
DEPRESSION: 0
ABDOMINAL PAIN: 0
NAUSEA: 0
CARDIOVASCULAR NEGATIVE: 1
PSYCHIATRIC NEGATIVE: 1
NEUROLOGICAL NEGATIVE: 1
VOMITING: 0
PALPITATIONS: 0
HEADACHES: 0
EYES NEGATIVE: 1
FOCAL WEAKNESS: 0
MYALGIAS: 0
COUGH: 0
SHORTNESS OF BREATH: 0
BRUISES/BLEEDS EASILY: 0
CONSTITUTIONAL NEGATIVE: 1
DIZZINESS: 0
GASTROINTESTINAL NEGATIVE: 1

## 2019-07-11 ASSESSMENT — PAIN SCALES - PAIN ASSESSMENT IN ADVANCED DEMENTIA (PAINAD)
BREATHING: NORMAL
FACIALEXPRESSION: SAD, FRIGHTENED, FROWN
TOTALSCORE: 4
BODYLANGUAGE: TENSE, DISTRESSED PACING, FIDGETING
NEGVOCALIZATION: OCCASIONAL MOAN/GROAN, LOW SPEECH, NEGATIVE/DISAPPROVING QUALITY
CONSOLABILITY: DISTRACTED OR REASSURED BY VOICE/TOUCH

## 2019-07-11 ASSESSMENT — LIFESTYLE VARIABLES: SUBSTANCE_ABUSE: 0

## 2019-07-11 NOTE — CARE PLAN
Problem: Safety  Goal: Will remain free from injury  Outcome: PROGRESSING SLOWER THAN EXPECTED  Refer to note

## 2019-07-11 NOTE — DISCHARGE PLANNING
Received Choice form at 1057  Agency/Facility Name: [facility]  Referral sent per Choice form @ [phone]

## 2019-07-11 NOTE — DISCHARGE PLANNING
Agency/Facility Name: [facility]  Spoke To: Correspondence  Outcome: They need more information, pt eval.

## 2019-07-11 NOTE — CONSULTS
[facility] Consult.   Patient Name: Dian Mendoza  Patient Age, Gender: 74 y.o., female  Date of Consult:7/11/2019      Name of Requesting Consultant(s): Amalia Adorno M.D.  Consultant: Santy Pierce M.D.   Reason for Consult: Dementia with behavior issues, polypharmacy, delirium    Chief Complaint:   Chief Complaint   Patient presents with   • Diarrhea     X 3 days, this pt has a strong medical history, she appears alert but does not know why she has this very runny diarrhea, denies antibiotic use. All started with abd cramping       History of Present Illness:  Dian is a 74 y.o. female with a past medical history of rheumatoid arthritis, chronic pain in the back and left knee, hypertension, hypothyroidism, history of clot presents from her home after being altered.  History is obtained from medical staff, medical chart and chart review.  The patient herself was a limited historian with regard to her recent history.  She is able to converse and tell me how she is feeling.  Geriatrics was consulted for the above issues.    During the interview the patient made a few comments regarding her appearance and how that embarrassed her.  She told me that she was having a lot of difficulty with her left knee and her left leg with pain on the medial aspect of it.  She is unable to tell me the acuity of the pain and whether it was chronic or acute however she did discuss how this is been bothering her in the past.  She tells me that anytime she tries to put weight on it she feels that it is going to break.  She endorsed some radiation around her knee but did not describe any hip or foot pain.  She said her right knee is doing much better and almost has no pain.  She remarks the pain is usually only worse with movement and palpation, it does not hurt if it just sits there.    During the visit the patient told me that she had to use the bathroom however upon attempting to transfer her to a bedside commode to  readjust her in bed she demonstrated fear of pain as well as pain with any attempts to move her.  Patient denies any issues with constipation that she, No chest pain.  The patient tells me she had about a fall 8 to 9 months ago however cannot describe the fall.  She tells me that she does not feel safe at home.  She says that they would open her up at home and that people were dressed up and look like old people that she knew when retrigger.    During the entire visit the patient had a viable mood.  Whenever describing her pain she would break into tears as well as discussing her home life.    Upon discussing what was most important to the patient being pain-free was important.  She says that she had lived a life that she wanted to admit longer than she expected.  The patient was able to acknowledge without prompting that there is a risk benefit with regards to pain management and mentation.    Upon discussion with staff it appears the patient was on hospice prior to admission.  There is concerns about drug diversion with regards to her hospice medications including opiates and benzos.    Patient was admitted with diarrhea but has not had a bowel movement since admission.  She had mild GAIL that improved with fluids.    The patient does have sensory awareness with regards to her bowel movements and urination.  However due to her limited mobility she does have incontinence.    Per RN staff the patient initially was admitted to confused however improved shortly after admission but then has subsequently had more reliable affect and confusion since then.  She cries relatively frequently and has had some paranoia with regards to why she was here in the hospital what we have to offer.  This morning the patient had episode of confusion and ripped out her IV, she now has a bedside sitter.    The patient denies any confusion since being here.  She endorsed feeling like things are crawling on her skin.        ROS:A 14 sytem  ROS is negative other than as stated above in HPI.     Past Medical History:   Diagnosis Date   • Anemia    • Atrial tachycardia, paroxysmal (HCC)     occasional arrythmia, has been evaluated by Dr. Reddy   • Cancer of left breast (HCC) 1997    Left breast   • Cardiomyopathy (HCC) 01/06/2015    Idiopathic with normal coronary arteries on cath. June 2018: Echocardiogram with normal LV size, mild concentric LVH, LVEF 50%. Mild TR. RVSP 35mmHg.   • Cholelithiasis 2014    With gallbladder wall thickening, now status post cholecystectomy   • COPD (chronic obstructive pulmonary disease) (HCC)    • E. coli sepsis (HCC)     Right buttock pressure wound   • Full dentures    • Gastritis    • Hypertension     • Hypokalemia     uncertain cause maintained with potassium replacement.   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   • Pulmonary hypertension (HCC)    • Renal atrophy, right    • Rheumatoid arthritis(714.0)     historical diagnosis   • S/P insertion of IVC (inferior vena caval) filter     Prior to 2000, records not available, not retrievable.   • Seizure (HCC)     As a child   • Sleep apnea     Witnessed in hospital needs work up   • Systolic and diastolic CHF, chronic (HCC)    • TMJ (temporomandibular joint syndrome)        Current Facility-Administered Medications:   •  acetaminophen (TYLENOL) tablet 1,000 mg, 1,000 mg, Oral, TID, Santy Pierce M.D., 1,000 mg at 07/11/19 1328  •  morphine 10 MG/5ML solution 10 mg, 10 mg, Oral, BID, Santy Pierce M.D., 10 mg at 07/11/19 1335  •  enoxaparin (LOVENOX) inj 40 mg, 40 mg, Subcutaneous,  DAILY, Santy Pierce M.D., 40 mg at 07/11/19 1337  •  buPROPion SR (WELLBUTRIN-SR) tablet 150 mg, 150 mg, Oral, BID, Santy Pierce M.D.  •  potassium chloride SA (Kdur) tablet 20 mEq, 20 mEq, Oral, Once, Amalia Adorno M.D.  •  nystatin (MYCOSTATIN) powder, , Topical, BID, Amalia Adorno M.D.  •  morphine 10 MG/5ML solution 5 mg, 5 mg, Oral, Q6HRS PRN, Amalia Adorno M.D., 5 mg at 07/11/19 0630  •  [CANCELED] Special Contact Isolation, , , CONTINUOUS **AND** [CANCELED] C Diff by PCR rflx Toxin, , , Once **AND** Pharmacy Consult Request, 1 Each, Other, PHARMACY TO DOSE, Quincy Larsen M.D.  •  albuterol inhaler 2 Puff, 2 Puff, Inhalation, Q6HRS PRN, Jasmyn Richardson M.D.  •  ammonium lactate (LAC-HYDRIN) 12 % lotion 1 Application, 1 Application, Topical, DAILY, Jasmyn Richardson M.D., 1 Application at 07/11/19 0515  •  bisoprolol (ZEBETA) 10 MG 2.5 mg, 2.5 mg, Oral, DAILY, Jasmyn Richardson M.D., 2.5 mg at 07/11/19 0512  •  budesonide-formoterol (SYMBICORT) 160-4.5 MCG/ACT inhaler 2 Puff, 2 Puff, Inhalation, BID, Jasmyn Richardson M.D., 2 Puff at 07/11/19 0510  •  levothyroxine (SYNTHROID) tablet 125 mcg, 125 mcg, Oral, AM ES, Jasmyn Richardson M.D., 125 mcg at 07/11/19 0513  •  lisinopril (PRINIVIL) tablet 5 mg, 5 mg, Oral, BID, Jasmyn Richardson M.D., 5 mg at 07/11/19 0513  •  NS infusion 2,000 mL, 2,000 mL, Intravenous, Continuous, Jasmyn Richardson M.D., Last Rate: 75 mL/hr at 07/11/19 0017, 1,000 mL at 07/11/19 0017  •  acetaminophen (TYLENOL) tablet 650 mg, 650 mg, Oral, Q6HRS PRN, Jasmyn Richardson M.D., 650 mg at 07/10/19 2059  •  ondansetron (ZOFRAN) syringe/vial injection 4 mg, 4 mg, Intravenous, Q4HRS PRN, Jasmyn Richardson M.D.  •  ondansetron (ZOFRAN ODT) dispertab 4 mg, 4 mg, Oral, Q4HRS PRN, Jasmyn Richardson M.D.  Social History     Social History   • Marital status:      Spouse name: N/A   • Number of children: N/A   • Years of education: N/A     Occupational History   • Not on file.     Social  History Main Topics   • Smoking status: Former Smoker     Packs/day: 1.50     Years: 40.00     Types: Cigarettes     Quit date: 1/1/1972   • Smokeless tobacco: Former User   • Alcohol use No      Comment: none since 2007   • Drug use: No   • Sexual activity: Not Currently     Other Topics Concern   • Not on file     Social History Narrative   • No narrative on file     Family History   Problem Relation Age of Onset   • Heart Disease Sister    • Heart Disease Mother    • Heart Disease Father    • Cancer Father         esophageal   • Non-contributory Brother         copd, cirrhosis, alzheimer's mild   • Heart Disease Brother         3 MIs   • GI Sister         colon polyps, precancerous   • Cancer Sister         breast X 2     Past Surgical History:   Procedure Laterality Date   • VAMSHI BY LAPAROSCOPY  8/27/2014    Performed by Ajith Kearney M.D. at [facility]   • GASTROSCOPY WITH BIOPSY  12/28/2011    Performed by RAD CANTU at [facility]   • EGD W/ENDOSCOPIC ULTRASOUND  12/28/2011    Performed by RAD CANTU at [facility]   • ERCP  12/28/2011    Performed by RAD CANTU at [facility]   • RECOVERY  12/8/2010    Performed by TERRELL MCKENNA at [facility]   • KNEE REPLACEMENT, TOTAL  2009    left   • ULCER DEBRIDEMENT  6/5/08    Performed by ARIELLE MITCHELL at SURGERY [facility]   • FLAP CLOSURE  6/5/08    Performed by ARIELLE MITCHELL at [facility]   • LUMBAR FUSION POSTERIOR  1998   • ABDOMINAL HYSTERECTOMY TOTAL  1970's   • ELBOW ORIF  1950    right   • BURSA EXCISION      bursa sac removed bilat hips   • CATARACT EXTRACTION WITH IOL Bilateral    • COLONOSCOPY  2001, 6/2010    normal   • KNEE ARTHROSCOPY      left   • MASTECTOMY      left   • OTHER ORTHOPEDIC SURGERY      BILAT FOOT   • TONSILLECTOMY           Physical Exam:  /48   Pulse (!) 46   Temp 36.4 °C (97.5 °F) (Axillary)   Resp  "18   Ht 1.778 m (5' 10\")   Wt 76.5 kg (168 lb 10.4 oz)   SpO2 96%   BMI 24.20 kg/m²   General: No acute distress alert, oriented to self, place, year, not oriented to situation or month, mildly diaphoretic complaining about pain recurrently  HEENT: Neck supple head atraumatic  Skin: Bruise noted on back  Extremities: Arthritic changes noted to hands bilaterally, left knee with multiple scars, some appear surgical, with mild swelling, no warmth, no overlying erythema  Right knee with full range of motion and just crepitus.  Patient with toddler range of motion of left leg, however she was able to have some mobility upon encouragement  Abdomen soft nontender non-distended  Back: No CVA tenderness  Delirium Screen: Technically negative, she did have signs of disorganized thinking on exam however she her attention was good with regards to the days of the week backwards  Psych: Patient does not appear to be returning responding to internal stimuli, she is tangential and loquacious  Vision Screen: Grossly normal, patient did endorse some decreased vision in left eye  Hearing Screen: Grossly normal    Labs: This CBC with stable anemia, CMP reviewed no acute abnormalities, mild hypokalemia, resolving GAIL  Imaging: *Knee x-ray, CT abdomen pelvis  EKG: None this admission    Assessment: 74-year-old female admitted with delirium likely secondary to narcotic overdose, improving however now showing signs of mild opiate withdrawal      Plan:  Sub-syndrome of delirium/attenuated delirium  In general it appears the patient has been improving since admission, she screen negative for delirium on my assessment however the that does not preclude its presence and she is high risk for worsening confusion.    Given the lack of acute findings, query med side effect as the main cause of her acute confusion (opiods) Report suggests she has not been on benzos for some time.  Her continued confusion may do be due to opiod withdrawal and a " new setting, see below, continue to be mindful for infectious or metabolic issues they may worsen her mental status.,  None readily apparent and have been assessed and treated for on admission, mild GAIL, hypokalemia    -Encourage mobility as much as patient allows    chronic opioid dependence  Concern for opioid withdrawal  Chronic pain  Rheumatoid arthritis  A/c  left knee pain  Impaired mobility  -Patient was on opiates at home, currently getting some liquid morphine with unclear improvement.  -Patient showing some mild signs of withdrawal  -Given her considerable pain we will schedule Tylenol 1000 g 3 times daily as well as scheduled morphine 10 mg twice daily and continue her as needed dosing.  -Continue to monitor opiate use and potentially consider starting gabapentin 100 mg 3 times daily if continued issues with pain.  -Encourage mobility as much as patient allows  -Hold opiates for excess sedation  -if pain management regimen does not work, consider ultrasound of the left leg to rule out DVT    Hypothyroidism  -We will recheck TSH with reflex T4  -continue levothyroxine 125 mcg daily.     Dementia with behavior disturbances -paranoia, depression  -Given the patient's labile affect will resume bupropion 150 mg twice daily,  -We have a TSH, B12, RPR, HIV on file that are unremarkable.  Her CT head in 10/2018 did not show any acute abnormalities, no focal neurological signs to suggest we need to repeat at this point.  -Treat pain as above     -With regards to addressing the patient's behaviors, she responds well to body language that is positive.  She also has a positive consultation with regards to [place] and reminding her that she is here may help with her mood and adherence to recommendations.  -Encourage mobility  -Continue sitter the patient appears to respond well to conversation and interaction.    CAD  COPD  Hypokalemia  HTN  -defer to primary provider for managmeent      Urinary urgency  -We  will stop oxybutynin, continue to follow with issues with urgency, oxybutynin is anticholinergic and cause confusion in elderly adults      Polypharmacy  -Transition heparin to enoxaparin to decrease the amount of injections per day    Disposition  -PT/OT consulting, it appears that we are assessing her hospice eligibility, RN reports that she graduated from hospice about 1 month ago.   -Given patient's concerns with safety at home, recommend  filing EPS case if it has not been done already      Interventions to be considered in all patients in order to minimize the risk of delirium.   -do not disturb patient (vitals or lab draws) between the hours of 10 PM and 6 AM.  -ideally the patient should not sleep during the day and we should avoid day time naps.   -up in chair for meals  -ambulate at least three times daily, as able  -watch for constipation  -timed voiding - ask patient is they would like to go to the bathroom q 2-3 hours, except during the do not disturb hours.   -remove all necessary lines (central lines, peripheral IVs, feeding tubes, horn catheters)  -unless the patient has shown harm to self or others I would recommend against use of restraints - either chemical or physical (antipsychotics)   -minimize polypharmacy, do not dose medication during sleep hours      Thank you for this interesting consult. We will continue to follow this patient along with you.       Santy Pierce M.D.  Geriatric Attending  [facility]  Available Monday-Friday 8:00-5:00 (excudling holidays)    This note was partially dictated with voice recognition software, for any confusion please do not hesitate to contact me.       Direct Links to Patient Handouts:    CDC Healthy Aging  NIH National [place] on Aging  [facility] Patient Resources    Links that can be Cut and Paste into your browser:    Healthy Aging  www.healthinaging.org  Staying Active  www.activeandhealthy.n[place].gov.au  Geriatrics Online    [email or web address]

## 2019-07-11 NOTE — CARE PLAN
Problem: Communication  Goal: The ability to communicate needs accurately and effectively will improve    Intervention: Reorient patient to environment as needed  Patient frequently reoriented to facility, room and procedures.      Problem: Safety  Goal: Will remain free from falls    Intervention: Implement fall precautions  Bed alarm set and patient educated to call before trying to get out of bed.

## 2019-07-11 NOTE — PROGRESS NOTES
Hospital Medicine Daily Progress Note    Date of Service  7/11/2019    Chief Complaint  74 y.o. female admitted 7/9/2019 with diarrhea    Hospital Course    Patient is a 74 year old with an extensive medical history including chronic pain and debility who has been on home hospice.  Reportedly she received multiple laxative for treatment of constipation and was brought in with subsequent diarrhea, dehydration and worsening confusion.        Interval Problem Update  Some hallucination today, appears to have mild opiate w/d - will increase dose  She is axox to person only, she is cooperative but requires frequent re direction and reassurance, she denies pain currently but is afraid that her knee pain will return  No further diarrhea  Ros otherwise negative    Consultants/Specialty  Hospice  Geriatrics    Code Status  DNR/ DNI    Disposition  tbd    Review of Systems  Review of Systems   Unable to perform ROS: Critical illness   Constitutional: Negative.    HENT: Negative.    Eyes: Negative.  Negative for blurred vision.   Respiratory: Negative.  Negative for cough and shortness of breath.    Cardiovascular: Negative.  Negative for chest pain, palpitations and leg swelling.   Gastrointestinal: Negative.  Negative for abdominal pain, nausea and vomiting.   Genitourinary: Negative.  Negative for dysuria.   Musculoskeletal: Positive for joint pain. Negative for myalgias.   Skin: Negative.  Negative for itching and rash.   Neurological: Negative.  Negative for dizziness, focal weakness and headaches.   Endo/Heme/Allergies: Negative.  Does not bruise/bleed easily.   Psychiatric/Behavioral: Negative.  Negative for depression, substance abuse and suicidal ideas.   All other systems reviewed and are negative.       Physical Exam  Temp:  [36.3 °C (97.3 °F)-36.9 °C (98.4 °F)] 36.4 °C (97.5 °F)  Pulse:  [46-83] 46  Resp:  [18] 18  BP: (128-157)/(48-89) 146/48  SpO2:  [90 %-97 %] 96 %    Physical Exam   Constitutional: She is  oriented to person, place, and time. She appears well-developed and well-nourished. No distress.   HENT:   Head: Normocephalic and atraumatic.   Mouth/Throat: Oropharynx is clear and moist.   Eyes: Conjunctivae are normal.   Neck: Normal range of motion. Neck supple.   Cardiovascular: Normal rate, normal heart sounds and intact distal pulses.  Exam reveals no gallop and no friction rub.    No murmur heard.  Pulmonary/Chest: Effort normal and breath sounds normal. No respiratory distress. She has no wheezes. She has no rales. She exhibits no tenderness.   Abdominal: Soft. Bowel sounds are normal. She exhibits no distension and no mass. There is no tenderness. There is no rebound and no guarding.   Musculoskeletal: Normal range of motion. She exhibits no edema or tenderness.   L knee swelling, full rom, well healed surgical scars   Neurological: She is alert and oriented to person, place, and time. She has normal reflexes. No cranial nerve deficit. She exhibits normal muscle tone. Coordination normal.   Poor historian, axox1, increased confusion, hallucination   Skin: Skin is warm and dry. No rash noted. She is not diaphoretic. No erythema. No pallor.   Nursing note and vitals reviewed.      Fluids    Intake/Output Summary (Last 24 hours) at 07/11/19 1354  Last data filed at 07/11/19 0400   Gross per 24 hour   Intake             1050 ml   Output                0 ml   Net             1050 ml       Laboratory  Recent Labs      07/09/19   1700  07/10/19   0651   WBC  11.9*  10.9*   RBC  3.89*  3.43*   HEMOGLOBIN  11.2*  10.0*   HEMATOCRIT  35.5*  32.4*   MCV  91.3  94.5   MCH  28.8  29.2   MCHC  31.5*  30.9*   RDW  48.0  49.8   PLATELETCT  317  263   MPV  10.1  10.0     Recent Labs      07/09/19   1700  07/10/19   0651  07/11/19   0419   SODIUM  141  139  141   POTASSIUM  3.5*  3.7  3.4*   CHLORIDE  107  112  112   CO2  22  19*  19*   GLUCOSE  101*  84  80   BUN  35*  24*  21   CREATININE  1.56*  1.13  1.13   CALCIUM   8.7  7.9*  8.0*     Recent Labs      07/09/19   1700   INR  1.10               Imaging  DX-KNEE 3 VIEWS LEFT   Final Result         1.  No acute traumatic bony injury.   2.  Atherosclerosis      CT-ABDOMEN-PELVIS W/O   Final Result            1. No acute abnormality.   2. Prior cholecystectomy.   3. Additional findings as detailed.                      Assessment/Plan  CKD (chronic kidney disease) stage 3, GFR 30-59 ml/min ([facility])- (present on admission)   Assessment & Plan    Improved with fluids       Diarrhea due to drug   Assessment & Plan    Resolved  Likely due to laxatives     Opioid dependence with delirium ([facility])- (present on admission)   Assessment & Plan    Hospice consult pending  Suspect opiate w/d, home regimen was unclear  Will increase dose  Discussed with geriatrics - their assistance is appreciated          VTE prophylaxis: heparin

## 2019-07-11 NOTE — CARE PLAN
Problem: Knowledge Deficit  Goal: Knowledge of disease process/condition, treatment plan, diagnostic tests, and medications will improve  Outcome: PROGRESSING SLOWER THAN EXPECTED  Refer to note

## 2019-07-11 NOTE — PROGRESS NOTES
Received report from Day RN and assumed care of patient.  She was sitting up in bed crying.  Patient confused as to where she is and what is going on.  IV is patent and infusing NS.  Patient reports left leg pain at knee and ankle. Oral liquid morphine given.  BLE edema noted.  Explained plan of care and all questions answered to the best of my ability.  Call light within reach and patient encouraged to call for any needs.  Patient reoriented multiple times.  Bed alarm set and hourly rounding in place.

## 2019-07-11 NOTE — PROGRESS NOTES
Updated son on poc regarding his mother.   Liquid morphine added to patients prn meds 2/2 ble pain. Admin x 1 this shift with good effect.  Patient noted to become more anxious and confused throughout shift, wctm.

## 2019-07-11 NOTE — PROGRESS NOTES
Patient a+o x 3, reoriented to event - forgetful/agitated/anxious/visual hallucinations/noncompliant with instructions/delusional. No s&s of cardiac/respiratory distress, vss.  Patient's behavior got worse throughout the morning - she ripped out her iv/verbally aggressive to staff/trying to get oob - sitter ordered for patient per Dr. Adorno - sitter at bedside since 1000.   C/o 8/10 generalized pain this am on initial assessment - morphine admin on prior shift around 0630 - [facility]. Generalized edema to ble's, left knee swollen. Patient repositioned q 2 hours and incontinent care provided prn. No bm since preadmission, wctm- vanco/isolation d/c'd. Tolerating diet. Fall precautions/hourly rounding maintained, call light within reach and functioning, all items within reach, bed alarm active.  Patient encouraged to call for assistance, poc reviewed with patient - reinforcement required throughout shift, ?'s/concerns answered.

## 2019-07-12 LAB
T4 FREE SERPL-MCNC: 1.08 NG/DL (ref 0.58–1.64)
TSH SERPL DL<=0.005 MIU/L-ACNC: 8.1 UIU/ML (ref 0.38–5.33)

## 2019-07-12 PROCEDURE — 99225 PR SUBSEQUENT OBSERVATION CARE,LEVEL II: CPT | Performed by: HOSPITALIST

## 2019-07-12 PROCEDURE — A9270 NON-COVERED ITEM OR SERVICE: HCPCS | Performed by: HOSPITALIST

## 2019-07-12 PROCEDURE — 84439 ASSAY OF FREE THYROXINE: CPT

## 2019-07-12 PROCEDURE — 84443 ASSAY THYROID STIM HORMONE: CPT

## 2019-07-12 PROCEDURE — 700101 HCHG RX REV CODE 250: Performed by: INTERNAL MEDICINE

## 2019-07-12 PROCEDURE — 36415 COLL VENOUS BLD VENIPUNCTURE: CPT

## 2019-07-12 PROCEDURE — 700111 HCHG RX REV CODE 636 W/ 250 OVERRIDE (IP): Performed by: INTERNAL MEDICINE

## 2019-07-12 PROCEDURE — 700102 HCHG RX REV CODE 250 W/ 637 OVERRIDE(OP): Performed by: INTERNAL MEDICINE

## 2019-07-12 PROCEDURE — A9270 NON-COVERED ITEM OR SERVICE: HCPCS | Performed by: INTERNAL MEDICINE

## 2019-07-12 PROCEDURE — 96372 THER/PROPH/DIAG INJ SC/IM: CPT

## 2019-07-12 PROCEDURE — 700102 HCHG RX REV CODE 250 W/ 637 OVERRIDE(OP): Performed by: HOSPITALIST

## 2019-07-12 PROCEDURE — G0378 HOSPITAL OBSERVATION PER HR: HCPCS

## 2019-07-12 RX ORDER — GABAPENTIN 100 MG/1
100 CAPSULE ORAL 3 TIMES DAILY
Status: DISCONTINUED | OUTPATIENT
Start: 2019-07-12 | End: 2019-07-17 | Stop reason: HOSPADM

## 2019-07-12 RX ORDER — MORPHINE SULFATE 10 MG/5ML
5 SOLUTION ORAL 2 TIMES DAILY
Status: DISCONTINUED | OUTPATIENT
Start: 2019-07-12 | End: 2019-07-12

## 2019-07-12 RX ORDER — LEVOTHYROXINE SODIUM 0.12 MG/1
125 TABLET ORAL ONCE
Status: COMPLETED | OUTPATIENT
Start: 2019-07-12 | End: 2019-07-12

## 2019-07-12 RX ORDER — POTASSIUM CHLORIDE 20 MEQ/1
20 TABLET, EXTENDED RELEASE ORAL DAILY
Status: DISCONTINUED | OUTPATIENT
Start: 2019-07-12 | End: 2019-07-17

## 2019-07-12 RX ORDER — ACETAMINOPHEN 500 MG
1000 TABLET ORAL ONCE
Status: COMPLETED | OUTPATIENT
Start: 2019-07-12 | End: 2019-07-12

## 2019-07-12 RX ORDER — BUPROPION HYDROCHLORIDE 150 MG/1
150 TABLET, EXTENDED RELEASE ORAL ONCE
Status: COMPLETED | OUTPATIENT
Start: 2019-07-12 | End: 2019-07-12

## 2019-07-12 RX ADMIN — BUDESONIDE AND FORMOTEROL FUMARATE DIHYDRATE 2 PUFF: 160; 4.5 AEROSOL RESPIRATORY (INHALATION) at 08:31

## 2019-07-12 RX ADMIN — LEVOTHYROXINE SODIUM 125 MCG: 125 TABLET ORAL at 09:37

## 2019-07-12 RX ADMIN — ENOXAPARIN SODIUM 40 MG: 100 INJECTION SUBCUTANEOUS at 05:34

## 2019-07-12 RX ADMIN — GABAPENTIN 100 MG: 100 CAPSULE ORAL at 21:39

## 2019-07-12 RX ADMIN — NYSTATIN: 100000 POWDER TOPICAL at 21:43

## 2019-07-12 RX ADMIN — LISINOPRIL 5 MG: 5 TABLET ORAL at 08:29

## 2019-07-12 RX ADMIN — POTASSIUM CHLORIDE 20 MEQ: 1500 TABLET, EXTENDED RELEASE ORAL at 21:38

## 2019-07-12 RX ADMIN — LISINOPRIL 5 MG: 5 TABLET ORAL at 21:47

## 2019-07-12 RX ADMIN — BUPROPION HYDROCHLORIDE 150 MG: 150 TABLET, EXTENDED RELEASE ORAL at 21:39

## 2019-07-12 RX ADMIN — ACETAMINOPHEN 1000 MG: 500 TABLET, FILM COATED ORAL at 08:29

## 2019-07-12 RX ADMIN — AMMONIUM LACTATE 1 APPLICATION: 12 LOTION TOPICAL at 08:32

## 2019-07-12 RX ADMIN — NYSTATIN: 100000 POWDER TOPICAL at 08:30

## 2019-07-12 RX ADMIN — ACETAMINOPHEN 1000 MG: 500 TABLET, FILM COATED ORAL at 21:39

## 2019-07-12 RX ADMIN — GABAPENTIN 100 MG: 100 CAPSULE ORAL at 14:01

## 2019-07-12 RX ADMIN — BISOPROLOL FUMARATE 2.5 MG: 10 TABLET ORAL at 08:35

## 2019-07-12 RX ADMIN — BUPROPION HYDROCHLORIDE 150 MG: 150 TABLET, EXTENDED RELEASE ORAL at 08:29

## 2019-07-12 ASSESSMENT — ENCOUNTER SYMPTOMS
BLURRED VISION: 0
NEUROLOGICAL NEGATIVE: 1
DIZZINESS: 0
CONSTITUTIONAL NEGATIVE: 1
PALPITATIONS: 0
COUGH: 0
BRUISES/BLEEDS EASILY: 0
NAUSEA: 0
MYALGIAS: 0
CARDIOVASCULAR NEGATIVE: 1
GASTROINTESTINAL NEGATIVE: 1
ABDOMINAL PAIN: 0
DEPRESSION: 0
EYES NEGATIVE: 1
FOCAL WEAKNESS: 0
PSYCHIATRIC NEGATIVE: 1
VOMITING: 0
SHORTNESS OF BREATH: 0
RESPIRATORY NEGATIVE: 1
HEADACHES: 0

## 2019-07-12 ASSESSMENT — PAIN SCALES - PAIN ASSESSMENT IN ADVANCED DEMENTIA (PAINAD)
FACIALEXPRESSION: SAD, FRIGHTENED, FROWN
BREATHING: OCCASIONAL LABORED BREATHING, SHORT PERIOD OF HYPERVENTILATION
NEGVOCALIZATION: OCCASIONAL MOAN/GROAN, LOW SPEECH, NEGATIVE/DISAPPROVING QUALITY
TOTALSCORE: 6
BODYLANGUAGE: TENSE, DISTRESSED PACING, FIDGETING
CONSOLABILITY: UNABLE TO CONSOLE, DISTRACT OR REASSURE

## 2019-07-12 ASSESSMENT — LIFESTYLE VARIABLES: SUBSTANCE_ABUSE: 0

## 2019-07-12 NOTE — PROGRESS NOTES
Spiritual Care Note    Patient Information     Patient's Name: Dian Mendoza   MRN: [id number]    YOB: 1945   Age and Gender: 74 y.o. female   Service Area:    Room (and Bed): [facility]/   Ethnicity or Nationality: White   Primary Language: English   Confucianist/Spiritual preference: Catholic   Place of Residence:    Family/Friends/Others Present: Staff   Clinical Team Present:    Medical Diagnosis(-es)/Procedure(s):    Code Status: DNAR/DNI    Date of Admission: 7/9/2019   Length of Stay: 0 days        Spiritual Care Provider Information:  Name of Spiritual Care Provider: Stew  Title of Spiritual Care Provider:   Phone Number: [phone]  E-mail: [email or web address]  Total time : 10 minutes    Spiritual Screen Results:    Gen Nursing  Spiritual Screen  Is your spiritual health or inner well-being important to you as you cope with your medical condition?: Unable to determine  Would you like to receive a visit from our Spiritual Care team or your own Adventism or spiritual leader?: Unable to determine  Was spiritual care education provided to the patient?: Declined  Cultural/Spiritual Needs During Care: Ceremonial  Ceremonial Considerations: Prayer  Sources of Hope/Tiffanie: Family, Animals     Palliative Care  PC Confucianist/Spiritual Screening  Was spiritual care education provided to the patient?: Declined      Encounter/Request Information  Encounter/Request Type   Visited With: Patient  Nature of the Visit: Initial, On shift  Continue Visiting: Yes  General Visit: Yes  Referral From/ Origin of Request: Epic nursing    Religous Needs/Values  Confucianist Needs Visit  Confucianist Needs: Prayer    Spiritual Assessment   Spiritual Care Encounters  Observations/Symptoms: Discouraged, Pain, Sadness  Interacton/Conversation: Patient shared her problems and wanted prayer.  Assessment: Need  Need: Norma Issues  Intended Effects: Norma Affirmation  Outcomes: Emotional Release  Plan: Visit Upon  Request    Notes:

## 2019-07-12 NOTE — PROGRESS NOTES
"Geriatric Progress Note:  7/12/2019  Chief Complaint   Patient presents with   • Diarrhea     X 3 days, this pt has a strong medical history, she appears alert but does not know why she has this very runny diarrhea, denies antibiotic use. All started with abd cramping     75 y/o F admitted with decrease consciencesness thought to be secondary to opiates. PMH of dementia, previously on hospice however graduated about a month ago.     S: received scheduled morphine last night, patient had agitation two hours later and pulled out IV,RN voiced concern that was inciting factor. This AM patient has liable mood, has recurrent thoughts about her pain in left knee and left shoulder, she discusses how the surgeons will not provider her any more surgery even though that is what she needs. She asked to have a family member from [place] to fly out and come visit her. The patient denies formications, abdominal pain, N/V. Endorses good appetite. She feels afraid and does not like not know what is going on. Continued pain in left leg and left shoulder. She says RN is doing a good job in taking care of her.   She was unclear on why she had multiple scars on her arms that appeared to be healed excoriations.     ROS: a 14 pt ROS was negative other than as stated above.     O:/60   Pulse 73   Temp 36.3 °C (97.4 °F) (Oral)   Resp 18   Ht 1.778 m (5' 10\")   Wt 76.5 kg (168 lb 10.4 oz)   SpO2 99%   BMI 24.20 kg/m²   Gen: NAD, Alert and oriented X2 (not to place or situation), laying in bed left leg elevated on pillows.   Neck: supple  CV: RRR +3/6 systolic murmur at LLS, diastolic murmur also heard. 2+ radial and Dp pulses bilaterally  Lungs: Normal effort CAB  Neuro: able to sit up in bed independently  Ext: left knee without erythema or warmth, scars again noted. Painful to passive ROM, did not attempt active ROM  Back: Kyphosis.   Delirium Screen: negative; able to say days of week backwards. disorganized thinking " "present. She does not have significant fluctuation in her mood and speech as she gets anxious with open ended questions.   Psych: tangential, does not appear to be responding to internal stimuli.     Labs/Imaging/EKG: none new.     Assessment: *73 y/o multi-morbid F here here AMS secondary to opiates.    Plan:   Left knee pain  RA  opoid withdrawal.  Chronic Pain  -appears improved overall  -continue scheduled APAP  -unclear if morphine was inciting incident with pulling out IV last night   -given decrease signs of opoid withdrawal and concern about side effects of 10 mg morphine last night will decrease to 5 mg BID and if patient has issues with this can consider avoiding opioids and starting gabapentin 100 mg TID to see if that can potentially improve function. Goal for pain control in this patient would be to facilitate transfer into a wheel chair. The patient state she has not walked in some time. She appeared comfortable laying in bed.   -attempt to get patient OOB into wheelchair.     Delirium  -opoid withdrawal delirium likely improving-  acute issues last night, multifactorial, possibly med related, morphine  -agree with not placing another IV.  -avoid unneeded night time interruptions, avoid night time VS.  -avoid unneeded tethers.  -remind patient she is in a safe place at \"[facility]\"     Dementia with behavior issues  Depression  Liable mood associated with crying  -recommend avoiding asking open ended questions to the patient as she has a tendency to change the topic to something that makes her sad, she is loquacious and tangential and she likely needs to be continually redirected in order to faciliate her care  -consider psych consult to see if bupropion is the best medication of choice given the patients symptoms    Hypothyroidism  -repeat TSH with reflex T4    Urinary urgency  -continue to hold oxybutynin, so signs of continued at this point.     CAD  COPD  Hypokalemia  HTN  -defer to " primary provider for management    Disposition    Interventions to be considered in all patients in order to minimize the risk of delirium.   -do not disturb the atient (vitals or lab draws) between the hours of 10 PM and 6 AM.  -ideally the patient should not sleep during the day and we should avoid day time naps.   -up in chair for meals  -ambulate at least three times daily, as able  -watch for constipation  -timed voiding - ask patient if they would like to go to the bathroom q 2-3 hours, except during the do not disturb hours.   -remove all unnecessary lines (central lines, peripheral IVs, feeding tubes, horn catheters)  -As the patient has shown harm to self or others, I would recommend against use of restraints - either chemical or physical (antipsychotics)   -minimize polypharmacy, if possible, medications should not be dosed during sleep hours        Thank you for including us in the care of this patient.   We will continue to follow that patient along with you on Monday if the patient is still admitted.   Unfortunately we do not have weekend or night time coverage. Dr. Rothman to be on service.     Santy Pierce M.D.  Geriatric Physician   Can be reached at extension: [phone]  Monday - Friday 8-5      This note was partially complete completed using voice recognition software.  I did my best to correct errors prior to submitting this note, but for any concerns please do not hesitate to contact me.

## 2019-07-12 NOTE — CARE PLAN
Problem: Pain Management  Goal: Pain level will decrease to patient's comfort goal    Intervention: Follow pain managment plan developed in collaboration with patient and Interdisciplinary Team  Patient given scheduled medication for pain and will monitor for the need to increase dose.      Problem: Psychosocial Needs:  Goal: Level of anxiety will decrease    Intervention: Identify and develop with patient strategies to cope with anxiety triggers  Patient anxious.  Encouraged to take deep breaths to help calm down.

## 2019-07-12 NOTE — PROGRESS NOTES
Per report patietn agitated over night, refused am meds and ripped out her IV twice. Per Dr. Pierce, morphine decreased to 5 mg. Pt A&Ox1, took morning meds but can't follow commands well. Vitals stable, no need for IV per Dr. Adorno, pt having adequate PO intake.

## 2019-07-12 NOTE — DISCHARGE PLANNING
THIERRY received phone call from patient's son Alexander requesting that a referral be placed for [facility].  THIERRY faxed completed choice form to [facility] for referral follow up.

## 2019-07-12 NOTE — DISCHARGE PLANNING
THIERRY spoke with Annette from [facility].  She informed this SW that she does not feel that this patient qualifies for hospice at this time.

## 2019-07-12 NOTE — PROGRESS NOTES
Hospital Medicine Daily Progress Note    Date of Service  7/12/2019    Chief Complaint  74 y.o. female admitted 7/9/2019 with diarrhea    Hospital Course    Patient is a 74 year old with an extensive medical history including chronic pain and debility who has been on home hospice.  Reportedly she received multiple laxative for treatment of constipation and was brought in with subsequent diarrhea, dehydration and worsening confusion.        Interval Problem Update  No evidence of hallucinations this am, no s/o opiate w/d currently, reportedly became more agitated after morphine so was stopped by geriatrics this am  Remains confused, currently axox1, requires redirection, she denies pain currently  No sob  Ros otherwise negative    Consultants/Specialty  Hospice  Geriatrics    Code Status  DNR/ DNI    Disposition  tbd    Review of Systems  Review of Systems   Unable to perform ROS: Critical illness   Constitutional: Negative.    HENT: Negative.    Eyes: Negative.  Negative for blurred vision.   Respiratory: Negative.  Negative for cough and shortness of breath.    Cardiovascular: Negative.  Negative for chest pain, palpitations and leg swelling.   Gastrointestinal: Negative.  Negative for abdominal pain, nausea and vomiting.   Genitourinary: Negative.  Negative for dysuria.   Musculoskeletal: Negative for joint pain and myalgias.   Skin: Negative.  Negative for itching and rash.   Neurological: Negative.  Negative for dizziness, focal weakness and headaches.   Endo/Heme/Allergies: Negative.  Does not bruise/bleed easily.   Psychiatric/Behavioral: Negative.  Negative for depression, substance abuse and suicidal ideas.   All other systems reviewed and are negative.       Physical Exam  Temp:  [36.3 °C (97.4 °F)-36.6 °C (97.8 °F)] 36.3 °C (97.4 °F)  Pulse:  [61-73] 73  Resp:  [18] 18  BP: (149-162)/(53-95) 149/60  SpO2:  [95 %-99 %] 99 %    Physical Exam   Constitutional: She is oriented to person, place, and time. She  appears well-developed and well-nourished. No distress.   HENT:   Head: Normocephalic and atraumatic.   Mouth/Throat: Oropharynx is clear and moist.   Eyes: Conjunctivae are normal.   Neck: Normal range of motion. Neck supple.   Cardiovascular: Normal rate, normal heart sounds and intact distal pulses.  Exam reveals no gallop and no friction rub.    No murmur heard.  Pulmonary/Chest: Effort normal and breath sounds normal. No respiratory distress. She has no wheezes. She has no rales. She exhibits no tenderness.   Abdominal: Soft. Bowel sounds are normal. She exhibits no distension and no mass. There is no tenderness. There is no rebound and no guarding.   Musculoskeletal: Normal range of motion. She exhibits no edema or tenderness.   L knee swelling, full rom, well healed surgical scars   Neurological: She is alert and oriented to person, place, and time. She has normal reflexes. No cranial nerve deficit. She exhibits normal muscle tone. Coordination normal.   Poor historian, axox1, increased confusion, hallucination   Skin: Skin is warm and dry. No rash noted. She is not diaphoretic. No erythema. No pallor.   Nursing note and vitals reviewed.      Fluids    Intake/Output Summary (Last 24 hours) at 07/12/19 1353  Last data filed at 07/12/19 1200   Gross per 24 hour   Intake              730 ml   Output                0 ml   Net              730 ml       Laboratory  Recent Labs      07/09/19   1700  07/10/19   0651   WBC  11.9*  10.9*   RBC  3.89*  3.43*   HEMOGLOBIN  11.2*  10.0*   HEMATOCRIT  35.5*  32.4*   MCV  91.3  94.5   MCH  28.8  29.2   MCHC  31.5*  30.9*   RDW  48.0  49.8   PLATELETCT  317  263   MPV  10.1  10.0     Recent Labs      07/09/19   1700  07/10/19   0651  07/11/19   0419   SODIUM  141  139  141   POTASSIUM  3.5*  3.7  3.4*   CHLORIDE  107  112  112   CO2  22  19*  19*   GLUCOSE  101*  84  80   BUN  35*  24*  21   CREATININE  1.56*  1.13  1.13   CALCIUM  8.7  7.9*  8.0*     Recent Labs       07/09/19   1700   INR  1.10               Imaging  DX-KNEE 3 VIEWS LEFT   Final Result         1.  No acute traumatic bony injury.   2.  Atherosclerosis      CT-ABDOMEN-PELVIS W/O   Final Result            1. No acute abnormality.   2. Prior cholecystectomy.   3. Additional findings as detailed.                      Assessment/Plan  CKD (chronic kidney disease) stage 3, GFR 30-59 ml/min ([facility])- (present on admission)   Assessment & Plan    Improved with fluids       Diarrhea due to drug   Assessment & Plan    Resolved  Likely due to laxatives     Opioid dependence with delirium ([facility])- (present on admission)   Assessment & Plan    Hospice consult pending  Evidence of opiate w/d resolved  See above          VTE prophylaxis: heparin

## 2019-07-12 NOTE — PROGRESS NOTES
Brief Geriatric Note:       Called for confusion again about 2 hours after 5 mg morphine given. Patient was redirectable only after allowing her time to calm down.   Patient has continued issues with pain, however appears to be be having side effects without needed resolution/improvement in pain. Will d/c morphine both scheduled and PRN.   Patient is swatting at staff when we try to mobilize her and provide routine care.     Recommendations:  If patient gets agitated agree with allowing her time to calm down, especially if care is not urgent.     With regards to pain:  -consider ice/heat to see if improves pain.    Will start gabapentin 100 mg TID in hopes of improving baseline pain control. Can up titrate if no issues with lethargy. If further pain control is needed consider low dose oxycodone 2.5 mg q6 hours PRN, however she may have a similar reaction to this as she did to oxycodone and as such should be used judiciously.     Santy Pierce M.D.  Geriatric Physician [facility]  Contact: at  M-F 8-5

## 2019-07-12 NOTE — DISCHARGE PLANNING
Received Choice form at 1250  Agency/Facility Name: [facility]  Referral sent per Choice form @ 1256

## 2019-07-12 NOTE — PROGRESS NOTES
Throughout shift patient continued to be confused, tearful, agitated, paranoid, noncompliant with directions. Sitter still at bedside. No iv access obtained - failed 3 times to obtain iv access - charge RN aware. Refused all meds during day shift.

## 2019-07-12 NOTE — DISCHARGE PLANNING
Spoke with Tawnya at [facility] they will accept patient if hospice is in place. Per Tawnya, patient will not to have a sitter for at least 24 hours before discharge.  JANETTE Burleson has been advised.

## 2019-07-12 NOTE — CARE PLAN
Problem: Nutritional:  Goal: Achieve adequate nutritional intake  Patient will consume 50-75% of meals   Outcome: PROGRESSING AS EXPECTED  Only one recorded meal of % on 7/12 (breakfast).

## 2019-07-12 NOTE — PROGRESS NOTES
Received report and assumed care of patient.  Patient alert, crying and anxious.  Talked with patient and encouraged some deep breaths.  Patient reacted well to this.  Sitter by bedside.  Patient agreed to take medications schedule for 1800 at 2100.  Meds given.  IV inserted and infusing at this time.  VSS.  BLE edema noted.  Plan of care reviewed.  Questions answered.  Call light within reach.  Hourly rounding in place.
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
## 2019-07-12 NOTE — PROGRESS NOTES
Pt noted to be agitated 2 hours after morphine given. Pt yelling in halls, cussing at sitter, will not let staff touch her. Pt unable to be talked down. Pt finally settled down after 30 min with staff stepping away and allowing her to descalate. Paged Dr. Pierce, morphine discontinued tylenol prn added . Per Dr. Pierce we will observe her pain management with out opiate use for now.

## 2019-07-12 NOTE — DISCHARGE PLANNING
SW received phone call from Julissa with [facility] updating this SW that due to staffing they are going to have to decline this patient's referral.  SW called and left patient's son, Kehinde a message to call this SW at his earliest convenience.

## 2019-07-13 LAB
ANION GAP SERPL CALC-SCNC: 7 MMOL/L (ref 0–11.9)
BUN SERPL-MCNC: 13 MG/DL (ref 8–22)
CALCIUM SERPL-MCNC: 8.5 MG/DL (ref 8.4–10.2)
CHLORIDE SERPL-SCNC: 116 MMOL/L (ref 96–112)
CO2 SERPL-SCNC: 18 MMOL/L (ref 20–33)
CREAT SERPL-MCNC: 1.09 MG/DL (ref 0.5–1.4)
GLUCOSE SERPL-MCNC: 91 MG/DL (ref 65–99)
POTASSIUM SERPL-SCNC: 4.3 MMOL/L (ref 3.6–5.5)
SODIUM SERPL-SCNC: 141 MMOL/L (ref 135–145)

## 2019-07-13 PROCEDURE — 700102 HCHG RX REV CODE 250 W/ 637 OVERRIDE(OP): Performed by: INTERNAL MEDICINE

## 2019-07-13 PROCEDURE — A9270 NON-COVERED ITEM OR SERVICE: HCPCS | Performed by: INTERNAL MEDICINE

## 2019-07-13 PROCEDURE — 99224 PR SUBSEQUENT OBSERVATION CARE,LEVEL I: CPT | Performed by: HOSPITALIST

## 2019-07-13 PROCEDURE — 36415 COLL VENOUS BLD VENIPUNCTURE: CPT

## 2019-07-13 PROCEDURE — 700102 HCHG RX REV CODE 250 W/ 637 OVERRIDE(OP): Performed by: HOSPITALIST

## 2019-07-13 PROCEDURE — 96372 THER/PROPH/DIAG INJ SC/IM: CPT

## 2019-07-13 PROCEDURE — 700111 HCHG RX REV CODE 636 W/ 250 OVERRIDE (IP): Performed by: INTERNAL MEDICINE

## 2019-07-13 PROCEDURE — 80048 BASIC METABOLIC PNL TOTAL CA: CPT

## 2019-07-13 PROCEDURE — G0378 HOSPITAL OBSERVATION PER HR: HCPCS

## 2019-07-13 PROCEDURE — A9270 NON-COVERED ITEM OR SERVICE: HCPCS | Performed by: HOSPITALIST

## 2019-07-13 PROCEDURE — 700101 HCHG RX REV CODE 250: Performed by: HOSPITALIST

## 2019-07-13 RX ORDER — ACETAMINOPHEN 325 MG/1
650 TABLET ORAL 3 TIMES DAILY
Status: DISCONTINUED | OUTPATIENT
Start: 2019-07-13 | End: 2019-07-17 | Stop reason: HOSPADM

## 2019-07-13 RX ORDER — LIDOCAINE 50 MG/G
1 PATCH TOPICAL EVERY 24 HOURS
Status: DISCONTINUED | OUTPATIENT
Start: 2019-07-13 | End: 2019-07-17 | Stop reason: HOSPADM

## 2019-07-13 RX ADMIN — BUDESONIDE AND FORMOTEROL FUMARATE DIHYDRATE 2 PUFF: 160; 4.5 AEROSOL RESPIRATORY (INHALATION) at 06:17

## 2019-07-13 RX ADMIN — GABAPENTIN 100 MG: 100 CAPSULE ORAL at 12:26

## 2019-07-13 RX ADMIN — BUPROPION HYDROCHLORIDE 150 MG: 150 TABLET, EXTENDED RELEASE ORAL at 06:02

## 2019-07-13 RX ADMIN — ACETAMINOPHEN 650 MG: 325 TABLET, FILM COATED ORAL at 17:04

## 2019-07-13 RX ADMIN — ACETAMINOPHEN 650 MG: 325 TABLET, FILM COATED ORAL at 08:14

## 2019-07-13 RX ADMIN — LEVOTHYROXINE SODIUM 125 MCG: 125 TABLET ORAL at 06:02

## 2019-07-13 RX ADMIN — SENNOSIDES AND DOCUSATE SODIUM 1 TABLET: 8.6; 5 TABLET ORAL at 06:02

## 2019-07-13 RX ADMIN — ACETAMINOPHEN 1000 MG: 500 TABLET, FILM COATED ORAL at 06:02

## 2019-07-13 RX ADMIN — LIDOCAINE 1 PATCH: 50 PATCH TOPICAL at 12:27

## 2019-07-13 RX ADMIN — BISOPROLOL FUMARATE 2.5 MG: 10 TABLET ORAL at 06:17

## 2019-07-13 RX ADMIN — GABAPENTIN 100 MG: 100 CAPSULE ORAL at 06:02

## 2019-07-13 RX ADMIN — LISINOPRIL 5 MG: 5 TABLET ORAL at 06:02

## 2019-07-13 RX ADMIN — LISINOPRIL 5 MG: 5 TABLET ORAL at 17:04

## 2019-07-13 RX ADMIN — BUPROPION HYDROCHLORIDE 150 MG: 150 TABLET, EXTENDED RELEASE ORAL at 17:04

## 2019-07-13 RX ADMIN — ACETAMINOPHEN 650 MG: 325 TABLET, FILM COATED ORAL at 14:39

## 2019-07-13 RX ADMIN — BUDESONIDE AND FORMOTEROL FUMARATE DIHYDRATE 2 PUFF: 160; 4.5 AEROSOL RESPIRATORY (INHALATION) at 17:05

## 2019-07-13 RX ADMIN — AMMONIUM LACTATE 1 APPLICATION: 12 LOTION TOPICAL at 06:17

## 2019-07-13 RX ADMIN — NYSTATIN: 100000 POWDER TOPICAL at 06:17

## 2019-07-13 RX ADMIN — ENOXAPARIN SODIUM 40 MG: 100 INJECTION SUBCUTANEOUS at 06:03

## 2019-07-13 RX ADMIN — GABAPENTIN 100 MG: 100 CAPSULE ORAL at 17:04

## 2019-07-13 RX ADMIN — POTASSIUM CHLORIDE 20 MEQ: 1500 TABLET, EXTENDED RELEASE ORAL at 06:03

## 2019-07-13 RX ADMIN — NYSTATIN: 100000 POWDER TOPICAL at 17:04

## 2019-07-13 ASSESSMENT — ENCOUNTER SYMPTOMS
EYES NEGATIVE: 1
PALPITATIONS: 0
VOMITING: 0
ABDOMINAL PAIN: 0
CARDIOVASCULAR NEGATIVE: 1
FOCAL WEAKNESS: 0
MYALGIAS: 0
RESPIRATORY NEGATIVE: 1
BLURRED VISION: 0
BRUISES/BLEEDS EASILY: 0
CONSTITUTIONAL NEGATIVE: 1
COUGH: 0
NAUSEA: 0
DIZZINESS: 0
GASTROINTESTINAL NEGATIVE: 1
PSYCHIATRIC NEGATIVE: 1
HEADACHES: 0
DEPRESSION: 0
SHORTNESS OF BREATH: 0
NEUROLOGICAL NEGATIVE: 1

## 2019-07-13 ASSESSMENT — LIFESTYLE VARIABLES: SUBSTANCE_ABUSE: 0

## 2019-07-13 NOTE — PROGRESS NOTES
"Pt behavior improved from yesterday. She is alert, and cooperative with staff today. Pt is able to recollect details from the past and present, unlike yesterday. She was encouraged to sit in the chair today but refused stating she was \"too tired\". According to her son, she remains confused but her behavior is closer to her baseline.   "

## 2019-07-13 NOTE — PROGRESS NOTES
"Hospital Medicine Daily Progress Note    Date of Service  7/13/2019    Chief Complaint  74 y.o. female admitted 7/9/2019 with diarrhea    Hospital Course    Patient is a 74 year old with an extensive medical history including chronic pain and debility who has been on home hospice.  Reportedly she received multiple laxative for treatment of constipation and was brought in with subsequent diarrhea, dehydration and worsening confusion.        Interval Problem Update  Remains confused, she is axox to person and place  Remains tangential, no evidence of hallucinations currently, \"some\" left knee pain today, moving her left leg easily - swelling stable  Ros otherwise negative  Sitter at bedside    Consultants/Specialty  Hospice  Geriatrics  Psych    Code Status  DNR/ DNI    Disposition  tbd    Review of Systems  Review of Systems   Unable to perform ROS: Critical illness   Constitutional: Negative.    HENT: Negative.    Eyes: Negative.  Negative for blurred vision.   Respiratory: Negative.  Negative for cough and shortness of breath.    Cardiovascular: Negative.  Negative for chest pain, palpitations and leg swelling.   Gastrointestinal: Negative.  Negative for abdominal pain, nausea and vomiting.   Genitourinary: Negative.  Negative for dysuria.   Musculoskeletal: Negative for joint pain and myalgias.   Skin: Negative.  Negative for itching and rash.   Neurological: Negative.  Negative for dizziness, focal weakness and headaches.   Endo/Heme/Allergies: Negative.  Does not bruise/bleed easily.   Psychiatric/Behavioral: Negative.  Negative for depression, substance abuse and suicidal ideas.   All other systems reviewed and are negative.       Physical Exam  Temp:  [36.5 °C (97.7 °F)-36.6 °C (97.8 °F)] 36.5 °C (97.7 °F)  Pulse:  [76-91] 91  Resp:  [18] 18  BP: (153-155)/(79-80) 153/80  SpO2:  [98 %-99 %] 98 %    Physical Exam   Constitutional: She is oriented to person, place, and time. She appears well-developed and " well-nourished. No distress.   HENT:   Head: Normocephalic and atraumatic.   Mouth/Throat: Oropharynx is clear and moist.   Eyes: Conjunctivae are normal.   Neck: Normal range of motion. Neck supple.   Cardiovascular: Normal rate, normal heart sounds and intact distal pulses.  Exam reveals no gallop and no friction rub.    No murmur heard.  Pulmonary/Chest: Effort normal and breath sounds normal. No respiratory distress. She has no wheezes. She has no rales. She exhibits no tenderness.   Abdominal: Soft. Bowel sounds are normal. She exhibits no distension and no mass. There is no tenderness. There is no rebound and no guarding.   Musculoskeletal: Normal range of motion. She exhibits no edema or tenderness.   L knee swelling, full rom, well healed surgical scars   Neurological: She is alert and oriented to person, place, and time. She has normal reflexes. No cranial nerve deficit. She exhibits normal muscle tone. Coordination normal.   axox2   Skin: Skin is warm and dry. No rash noted. She is not diaphoretic. No erythema. No pallor.   Nursing note and vitals reviewed.      Fluids    Intake/Output Summary (Last 24 hours) at 07/13/19 1218  Last data filed at 07/13/19 1146   Gross per 24 hour   Intake              650 ml   Output                0 ml   Net              650 ml       Laboratory      Recent Labs      07/11/19   0419  07/13/19   0810   SODIUM  141  141   POTASSIUM  3.4*  4.3   CHLORIDE  112  116*   CO2  19*  18*   GLUCOSE  80  91   BUN  21  13   CREATININE  1.13  1.09   CALCIUM  8.0*  8.5                   Imaging  DX-KNEE 3 VIEWS LEFT   Final Result         1.  No acute traumatic bony injury.   2.  Atherosclerosis      CT-ABDOMEN-PELVIS W/O   Final Result            1. No acute abnormality.   2. Prior cholecystectomy.   3. Additional findings as detailed.                      Assessment/Plan  CKD (chronic kidney disease) stage 3, GFR 30-59 ml/min ([facility])- (present on admission)   Assessment & Plan    Improved  with fluids       Diarrhea due to drug   Assessment & Plan    Resolved  Likely due to laxatives     Opioid dependence with delirium ([facility])- (present on admission)   Assessment & Plan    Hospice consult pending  Evidence of opiate w/d resolved  See above        Psych consulted to re evaluate her chronic antidepressants    VTE prophylaxis: heparin

## 2019-07-13 NOTE — CARE PLAN
Problem: Safety  Goal: Will remain free from falls    Intervention: Implement fall precautions  Sitter in place to prevent falls.       Problem: Skin Integrity  Goal: Risk for impaired skin integrity will decrease    Intervention: Assess risk factors for impaired skin integrity and/or pressure ulcers  Patient repositioning self.  Incontinent pads changed when wet and when patient allows.

## 2019-07-13 NOTE — PROGRESS NOTES
Pt remains A&Ox1, however is less agitated this morning. Pt is allowing staff to clean her and turn her in bed. Pt is appropriately asking for the bed pan. Tylenol and heat packs given for pain along with scheduled gabapentin. Spoke with Dr. Adorno about adding ibuprofen and a lidocaine patch on, she is amenable to that.

## 2019-07-13 NOTE — PROGRESS NOTES
Received report from Day RN and assumed care of patient.  Patient alert, but very confused.  She was yelling at son and staff at that time.  Son left the room and reported that she went through something like this about 6 months ago. He was unable to say what had caused the behavior last time.  Later patient reported that she cannot control her mind.  Patient calmed down enough to take night medications and then within thirty minutes she was yelling and cursing again.  She refuses to let staff tend to adl's.  Redirection is difficult.  Staff talking to patient in a low voice when she yells and giving her time to calm down before finishing adl  tasks.  Hourly rounding in place.

## 2019-07-14 PROCEDURE — A9270 NON-COVERED ITEM OR SERVICE: HCPCS | Performed by: INTERNAL MEDICINE

## 2019-07-14 PROCEDURE — 99214 OFFICE O/P EST MOD 30 MIN: CPT | Performed by: PSYCHIATRY & NEUROLOGY

## 2019-07-14 PROCEDURE — 700101 HCHG RX REV CODE 250: Performed by: HOSPITALIST

## 2019-07-14 PROCEDURE — 99224 PR SUBSEQUENT OBSERVATION CARE,LEVEL I: CPT | Performed by: HOSPITALIST

## 2019-07-14 PROCEDURE — 700111 HCHG RX REV CODE 636 W/ 250 OVERRIDE (IP): Performed by: INTERNAL MEDICINE

## 2019-07-14 PROCEDURE — 700111 HCHG RX REV CODE 636 W/ 250 OVERRIDE (IP): Performed by: HOSPITALIST

## 2019-07-14 PROCEDURE — 96372 THER/PROPH/DIAG INJ SC/IM: CPT

## 2019-07-14 PROCEDURE — 700102 HCHG RX REV CODE 250 W/ 637 OVERRIDE(OP): Performed by: HOSPITALIST

## 2019-07-14 PROCEDURE — A9270 NON-COVERED ITEM OR SERVICE: HCPCS | Performed by: HOSPITALIST

## 2019-07-14 PROCEDURE — G0378 HOSPITAL OBSERVATION PER HR: HCPCS

## 2019-07-14 PROCEDURE — 700102 HCHG RX REV CODE 250 W/ 637 OVERRIDE(OP): Performed by: INTERNAL MEDICINE

## 2019-07-14 RX ORDER — HALOPERIDOL 5 MG/ML
2.5 INJECTION INTRAMUSCULAR ONCE
Status: COMPLETED | OUTPATIENT
Start: 2019-07-14 | End: 2019-07-14

## 2019-07-14 RX ORDER — HALOPERIDOL 5 MG/ML
2.5 INJECTION INTRAMUSCULAR ONCE
Status: DISCONTINUED | OUTPATIENT
Start: 2019-07-14 | End: 2019-07-14

## 2019-07-14 RX ADMIN — ACETAMINOPHEN 650 MG: 325 TABLET, FILM COATED ORAL at 13:59

## 2019-07-14 RX ADMIN — NYSTATIN: 100000 POWDER TOPICAL at 03:00

## 2019-07-14 RX ADMIN — BUPROPION HYDROCHLORIDE 150 MG: 150 TABLET, EXTENDED RELEASE ORAL at 17:57

## 2019-07-14 RX ADMIN — POTASSIUM CHLORIDE 20 MEQ: 1500 TABLET, EXTENDED RELEASE ORAL at 09:48

## 2019-07-14 RX ADMIN — POLYETHYLENE GLYCOL 3350 1 PACKET: 17 POWDER, FOR SOLUTION ORAL at 09:38

## 2019-07-14 RX ADMIN — BUPROPION HYDROCHLORIDE 150 MG: 150 TABLET, EXTENDED RELEASE ORAL at 09:43

## 2019-07-14 RX ADMIN — ENOXAPARIN SODIUM 40 MG: 100 INJECTION SUBCUTANEOUS at 09:53

## 2019-07-14 RX ADMIN — GABAPENTIN 100 MG: 100 CAPSULE ORAL at 09:45

## 2019-07-14 RX ADMIN — ACETAMINOPHEN 650 MG: 325 TABLET, FILM COATED ORAL at 09:37

## 2019-07-14 RX ADMIN — LISINOPRIL 5 MG: 5 TABLET ORAL at 20:22

## 2019-07-14 RX ADMIN — BUDESONIDE AND FORMOTEROL FUMARATE DIHYDRATE 2 PUFF: 160; 4.5 AEROSOL RESPIRATORY (INHALATION) at 20:22

## 2019-07-14 RX ADMIN — HALOPERIDOL LACTATE 2.5 MG: 5 INJECTION, SOLUTION INTRAMUSCULAR at 01:30

## 2019-07-14 RX ADMIN — AMMONIUM LACTATE 1 APPLICATION: 12 LOTION TOPICAL at 09:38

## 2019-07-14 RX ADMIN — BISOPROLOL FUMARATE 2.5 MG: 10 TABLET ORAL at 09:39

## 2019-07-14 RX ADMIN — GABAPENTIN 100 MG: 100 CAPSULE ORAL at 17:57

## 2019-07-14 RX ADMIN — LIDOCAINE 1 PATCH: 50 PATCH TOPICAL at 13:57

## 2019-07-14 RX ADMIN — GABAPENTIN 100 MG: 100 CAPSULE ORAL at 14:00

## 2019-07-14 RX ADMIN — NYSTATIN: 100000 POWDER TOPICAL at 18:00

## 2019-07-14 RX ADMIN — LEVOTHYROXINE SODIUM 125 MCG: 125 TABLET ORAL at 09:46

## 2019-07-14 RX ADMIN — BUDESONIDE AND FORMOTEROL FUMARATE DIHYDRATE 2 PUFF: 160; 4.5 AEROSOL RESPIRATORY (INHALATION) at 09:49

## 2019-07-14 RX ADMIN — LISINOPRIL 5 MG: 5 TABLET ORAL at 09:47

## 2019-07-14 RX ADMIN — ACETAMINOPHEN 650 MG: 325 TABLET, FILM COATED ORAL at 17:57

## 2019-07-14 ASSESSMENT — ENCOUNTER SYMPTOMS
FOCAL WEAKNESS: 0
CONSTITUTIONAL NEGATIVE: 1
ABDOMINAL PAIN: 0
GASTROINTESTINAL NEGATIVE: 1
BRUISES/BLEEDS EASILY: 0
PALPITATIONS: 0
HEADACHES: 0
NAUSEA: 0
PSYCHIATRIC NEGATIVE: 1
COUGH: 0
VOMITING: 0
CARDIOVASCULAR NEGATIVE: 1
BLURRED VISION: 0
DEPRESSION: 0
DIZZINESS: 0
MYALGIAS: 0
NEUROLOGICAL NEGATIVE: 1
RESPIRATORY NEGATIVE: 1
EYES NEGATIVE: 1
SHORTNESS OF BREATH: 0

## 2019-07-14 ASSESSMENT — LIFESTYLE VARIABLES: SUBSTANCE_ABUSE: 0
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
## 2019-07-14 NOTE — PSYCHIATRY
BRIEF PSYCHIATRIC CONSULT NOTE: patient seen, full note to follow.  -Legal Hold:NA    Clearly cognitively impaired. Says she has not been on Welbutrin for years. Unknown if accurate.

## 2019-07-14 NOTE — PROGRESS NOTES
Pt behaviors ramping up as night progresses, she is verbally abusive, swearing out loud, yelling out door. attempted to get out of bed x1. Pt appears irritable, exteremly talkative.  Refuses to turn light off, refuses to allow staff to touch her. hospitalist notified for orders. Haldol 2.5 mg ordered for anxiety. Awaiting results.

## 2019-07-14 NOTE — CARE PLAN
Problem: Knowledge Deficit  Goal: Knowledge of disease process/condition, treatment plan, diagnostic tests, and medications will improve    Intervention: Explain information regarding disease process/condition, treatment plan, diagnostic tests, and medications and document in education  Reviewed meds and treatment plan with pt weston Munoz who is at bedside, pt is forgetful and emotionally labile  Crying at intervals easily consoled verbally by staff. Staff communicates in simple and direct manner as pt easily confused. Assisted to bedside to dangle by 2 staff without dizziness nor lightheadedness, and unable to wt bear to lower ext so 3 person assist to commode. Off 1 on 1 sitting at 0845 and pt does call approx every 1/2 hour as exp 8 stools this shift loose and brown. Hospitalist aware and asks no laxatives to be given, will place sacral meplex dressing once stools slow down in occurrence. Plan SNF.

## 2019-07-14 NOTE — PROGRESS NOTES
Hospital Medicine Daily Progress Note    Date of Service  7/14/2019    Chief Complaint  74 y.o. female admitted 7/9/2019 with diarrhea    Hospital Course    Patient is a 74 year old with an extensive medical history including chronic pain and debility who has been on home hospice.  Reportedly she received multiple laxative for treatment of constipation and was brought in with subsequent diarrhea, dehydration and worsening confusion.        Interval Problem Update  Less agitated overall but reportedly was an issue last night  Left leg less painful  Ros otherwise negative  axox2    Consultants/Specialty  Hospice  Geriatrics  Psych    Code Status  DNR/ DNI    Disposition  tbd    Review of Systems  Review of Systems   Unable to perform ROS: Critical illness   Constitutional: Negative.    HENT: Negative.    Eyes: Negative.  Negative for blurred vision.   Respiratory: Negative.  Negative for cough and shortness of breath.    Cardiovascular: Negative.  Negative for chest pain, palpitations and leg swelling.   Gastrointestinal: Negative.  Negative for abdominal pain, nausea and vomiting.   Genitourinary: Negative.  Negative for dysuria.   Musculoskeletal: Negative for joint pain and myalgias.   Skin: Negative.  Negative for itching and rash.   Neurological: Negative.  Negative for dizziness, focal weakness and headaches.   Endo/Heme/Allergies: Negative.  Does not bruise/bleed easily.   Psychiatric/Behavioral: Negative.  Negative for depression, substance abuse and suicidal ideas.   All other systems reviewed and are negative.       Physical Exam  Temp:  [36.4 °C (97.5 °F)-37.2 °C (98.9 °F)] 36.7 °C (98 °F)  Pulse:  [74-89] 76  Resp:  [18] 18  BP: (146-160)/(70-78) 160/70  SpO2:  [97 %-100 %] 100 %    Physical Exam   Constitutional: She is oriented to person, place, and time. She appears well-developed and well-nourished. No distress.   HENT:   Head: Normocephalic and atraumatic.   Mouth/Throat: Oropharynx is clear and  moist.   Eyes: Conjunctivae are normal.   Neck: Normal range of motion. Neck supple.   Cardiovascular: Normal rate, normal heart sounds and intact distal pulses.  Exam reveals no gallop and no friction rub.    No murmur heard.  Pulmonary/Chest: Effort normal and breath sounds normal. No respiratory distress. She has no wheezes. She has no rales. She exhibits no tenderness.   Abdominal: Soft. Bowel sounds are normal. She exhibits no distension and no mass. There is no tenderness. There is no rebound and no guarding.   Musculoskeletal: Normal range of motion. She exhibits no edema or tenderness.   L knee improving   Neurological: She is alert and oriented to person, place, and time. She has normal reflexes. No cranial nerve deficit. She exhibits normal muscle tone. Coordination normal.   axox2   Skin: Skin is warm and dry. No rash noted. She is not diaphoretic. No erythema. No pallor.   Nursing note and vitals reviewed.      Fluids    Intake/Output Summary (Last 24 hours) at 07/14/19 1157  Last data filed at 07/14/19 1140   Gross per 24 hour   Intake              800 ml   Output             1200 ml   Net             -400 ml       Laboratory      Recent Labs      07/13/19   0810   SODIUM  141   POTASSIUM  4.3   CHLORIDE  116*   CO2  18*   GLUCOSE  91   BUN  13   CREATININE  1.09   CALCIUM  8.5                   Imaging  DX-KNEE 3 VIEWS LEFT   Final Result         1.  No acute traumatic bony injury.   2.  Atherosclerosis      CT-ABDOMEN-PELVIS W/O   Final Result            1. No acute abnormality.   2. Prior cholecystectomy.   3. Additional findings as detailed.                      Assessment/Plan  CKD (chronic kidney disease) stage 3, GFR 30-59 ml/min ([facility])- (present on admission)   Assessment & Plan    Improved with fluids       Diarrhea due to drug   Assessment & Plan    Resolved  Likely due to laxatives     Opioid dependence with delirium ([facility])- (present on admission)   Assessment & Plan    Declined by hospice as  she was just released from hospice after completing their 6 month limit  No further evidence of opiate w/d   Exam improving and pain improving, continue lidocaine patch prn and gabapentin  Continue to follow            VTE prophylaxis: heparin

## 2019-07-14 NOTE — PROGRESS NOTES
Medication haldol 2.5mg im effective. Pt quiet, cooperative, no irritability at this time. Resting quietly.

## 2019-07-14 NOTE — PROGRESS NOTES
Pt remains confuse dto time, place and situation. She is oriented x1 to self, mild anxiety, talkative. Med compliant. No outbursts noted thus far. She is cooperative with routine and care. Sitter at bedside.

## 2019-07-14 NOTE — PROGRESS NOTES
Pt medicated at 2am for agitation. Pt sleeping soundly, will retime morning meds for 8am when pt is more alert to take medications. Pt  resting quietly in bed. Pt slept aprox 3.5 hours this tour.

## 2019-07-15 PROBLEM — F32.A DEPRESSION: Status: ACTIVE | Noted: 2019-07-15

## 2019-07-15 PROBLEM — F03.90 DEMENTIA (HCC): Status: ACTIVE | Noted: 2019-07-15

## 2019-07-15 LAB — EKG IMPRESSION: NORMAL

## 2019-07-15 PROCEDURE — 96372 THER/PROPH/DIAG INJ SC/IM: CPT

## 2019-07-15 PROCEDURE — A9270 NON-COVERED ITEM OR SERVICE: HCPCS | Performed by: HOSPITALIST

## 2019-07-15 PROCEDURE — 97163 PT EVAL HIGH COMPLEX 45 MIN: CPT

## 2019-07-15 PROCEDURE — A9270 NON-COVERED ITEM OR SERVICE: HCPCS | Performed by: INTERNAL MEDICINE

## 2019-07-15 PROCEDURE — 93005 ELECTROCARDIOGRAM TRACING: CPT | Performed by: HOSPITALIST

## 2019-07-15 PROCEDURE — 700102 HCHG RX REV CODE 250 W/ 637 OVERRIDE(OP): Performed by: INTERNAL MEDICINE

## 2019-07-15 PROCEDURE — 93010 ELECTROCARDIOGRAM REPORT: CPT | Performed by: INTERNAL MEDICINE

## 2019-07-15 PROCEDURE — G0378 HOSPITAL OBSERVATION PER HR: HCPCS

## 2019-07-15 PROCEDURE — 99213 OFFICE O/P EST LOW 20 MIN: CPT | Performed by: INTERNAL MEDICINE

## 2019-07-15 PROCEDURE — 700101 HCHG RX REV CODE 250: Performed by: HOSPITALIST

## 2019-07-15 PROCEDURE — 99225 PR SUBSEQUENT OBSERVATION CARE,LEVEL II: CPT | Performed by: HOSPITALIST

## 2019-07-15 PROCEDURE — 700102 HCHG RX REV CODE 250 W/ 637 OVERRIDE(OP): Performed by: HOSPITALIST

## 2019-07-15 PROCEDURE — 97165 OT EVAL LOW COMPLEX 30 MIN: CPT

## 2019-07-15 PROCEDURE — 700111 HCHG RX REV CODE 636 W/ 250 OVERRIDE (IP): Performed by: INTERNAL MEDICINE

## 2019-07-15 RX ADMIN — ACETAMINOPHEN 650 MG: 325 TABLET, FILM COATED ORAL at 18:02

## 2019-07-15 RX ADMIN — ACETAMINOPHEN 650 MG: 325 TABLET, FILM COATED ORAL at 13:03

## 2019-07-15 RX ADMIN — BUDESONIDE AND FORMOTEROL FUMARATE DIHYDRATE 2 PUFF: 160; 4.5 AEROSOL RESPIRATORY (INHALATION) at 06:01

## 2019-07-15 RX ADMIN — AMMONIUM LACTATE 1 APPLICATION: 12 LOTION TOPICAL at 06:27

## 2019-07-15 RX ADMIN — GABAPENTIN 100 MG: 100 CAPSULE ORAL at 18:02

## 2019-07-15 RX ADMIN — BUDESONIDE AND FORMOTEROL FUMARATE DIHYDRATE 2 PUFF: 160; 4.5 AEROSOL RESPIRATORY (INHALATION) at 18:03

## 2019-07-15 RX ADMIN — LIDOCAINE 1 PATCH: 50 PATCH TOPICAL at 13:03

## 2019-07-15 RX ADMIN — LISINOPRIL 5 MG: 5 TABLET ORAL at 06:01

## 2019-07-15 RX ADMIN — BISOPROLOL FUMARATE 2.5 MG: 10 TABLET ORAL at 06:25

## 2019-07-15 RX ADMIN — LISINOPRIL 5 MG: 5 TABLET ORAL at 18:02

## 2019-07-15 RX ADMIN — NYSTATIN: 100000 POWDER TOPICAL at 06:02

## 2019-07-15 RX ADMIN — ACETAMINOPHEN 650 MG: 325 TABLET, FILM COATED ORAL at 06:01

## 2019-07-15 RX ADMIN — NYSTATIN: 100000 POWDER TOPICAL at 18:02

## 2019-07-15 RX ADMIN — GABAPENTIN 100 MG: 100 CAPSULE ORAL at 13:03

## 2019-07-15 RX ADMIN — GABAPENTIN 100 MG: 100 CAPSULE ORAL at 06:00

## 2019-07-15 RX ADMIN — BUPROPION HYDROCHLORIDE 150 MG: 150 TABLET, EXTENDED RELEASE ORAL at 18:02

## 2019-07-15 RX ADMIN — BUPROPION HYDROCHLORIDE 150 MG: 150 TABLET, EXTENDED RELEASE ORAL at 06:22

## 2019-07-15 RX ADMIN — ENOXAPARIN SODIUM 40 MG: 100 INJECTION SUBCUTANEOUS at 06:08

## 2019-07-15 RX ADMIN — LEVOTHYROXINE SODIUM 125 MCG: 125 TABLET ORAL at 06:01

## 2019-07-15 RX ADMIN — POTASSIUM CHLORIDE 20 MEQ: 1500 TABLET, EXTENDED RELEASE ORAL at 06:01

## 2019-07-15 ASSESSMENT — LIFESTYLE VARIABLES: SUBSTANCE_ABUSE: 0

## 2019-07-15 ASSESSMENT — ENCOUNTER SYMPTOMS
CONSTITUTIONAL NEGATIVE: 1
SHORTNESS OF BREATH: 0
BRUISES/BLEEDS EASILY: 0
GASTROINTESTINAL NEGATIVE: 1
ABDOMINAL PAIN: 0
NAUSEA: 0
MYALGIAS: 0
COUGH: 0
EYES NEGATIVE: 1
RESPIRATORY NEGATIVE: 1
PSYCHIATRIC NEGATIVE: 1
DIZZINESS: 0
CARDIOVASCULAR NEGATIVE: 1
FOCAL WEAKNESS: 0
DEPRESSION: 0
HEADACHES: 0
NEUROLOGICAL NEGATIVE: 1
BLURRED VISION: 0
PALPITATIONS: 0
VOMITING: 0

## 2019-07-15 ASSESSMENT — GAIT ASSESSMENTS: GAIT LEVEL OF ASSIST: UNABLE TO PARTICIPATE

## 2019-07-15 ASSESSMENT — COGNITIVE AND FUNCTIONAL STATUS - GENERAL
SUGGESTED CMS G CODE MODIFIER MOBILITY: CM
MOVING TO AND FROM BED TO CHAIR: A LOT
CLIMB 3 TO 5 STEPS WITH RAILING: TOTAL
MOBILITY SCORE: 8
MOVING FROM LYING ON BACK TO SITTING ON SIDE OF FLAT BED: UNABLE
WALKING IN HOSPITAL ROOM: TOTAL
TURNING FROM BACK TO SIDE WHILE IN FLAT BAD: A LOT
STANDING UP FROM CHAIR USING ARMS: TOTAL

## 2019-07-15 ASSESSMENT — ACTIVITIES OF DAILY LIVING (ADL): TOILETING: INDEPENDENT

## 2019-07-15 NOTE — DISCHARGE PLANNING
THIERRY spoke with patient's son, Alexander regarding patient current discharge plan.  SW updated Alexander that [facility] his mother with [facility] only and [facility] declined.  THIERRY also discussed group home placement as an option.  Alexander requested a list be e-mailed to him which SW did. Alexander is going to talk with Kehinde and let this SW know what is decided.

## 2019-07-15 NOTE — THERAPY
"Occupational Therapy Evaluation completed.   Functional Status:  Admitted with failure to thrive and abd pain. A&Ox3. Confusion noted; poor historian. Max encouragement needed to sit EOB, but agreeable. Sup to sit with ModA. Scoots with ModA. Tolerates <10\" EOB. Grooming with Meli. Gown change with Meli. C/o B knee/ankle pain. Unable to perform STS or transfer. BTB with ModA.      Plan of Care: Will benefit from Occupational Therapy 2 times per week  Discharge Recommendations:  Equipment: Will Continue to Assess for Equipment Needs.  Pt currently requires 24/7 Sup, extensive assistance with ADL's and mobility. May benefit from snf, depending upon participation with therapies.       See \"Rehab Therapy-Acute\" Patient Summary Report for complete documentation.    "

## 2019-07-15 NOTE — CARE PLAN
Problem: Nutritional:  Goal: Achieve adequate nutritional intake  Patient will consume 50-75% of meals   Outcome: PROGRESSING SLOWER THAN EXPECTED

## 2019-07-15 NOTE — CARE PLAN
Problem: Bowel/Gastric:  Goal: Normal bowel function is maintained or improved  Pt having loose stools. Held scheduled bowel meds.     Problem: Pain Management  Goal: Pain level will decrease to patient's comfort goal  Pt c/o left knee pain, administered scheduled lidocaine patch with good relief.

## 2019-07-15 NOTE — DIETARY
Nutrition Services Brief Update:  Pt followed by RD for poor oral intake and unintended weight loss.  Recorded PO intake usually <25% on Regular, Vegetarian diet.  Supplements of Boost Plus added 7/13.  Noted pt did drink % of Boost x 2 on 7/13.  Pt states she loves Boost and is drinking them.  Requests chocolate flavor.  Reviewed food preferences with pt and encouraged PO intake.  RD will continue to follow.

## 2019-07-15 NOTE — PROGRESS NOTES
Pt c/o chest pain and chest tightness. Stat ekg ordered.V/S are as follows:149/69, hr:75  ,RR=18, O2 SAT=98 % ON RA, 98.0 Temp taken orally.

## 2019-07-15 NOTE — DISCHARGE PLANNING
THIERRY received call from Kehinde, Son and SW updated him on information around his mother's discharge plan.  Kehinde updated this SW that his number on goal is for him mother to go to [place]. Kehinde is going to look at the local Hospice agencies and let this SW know where he would like the referral sent next.

## 2019-07-15 NOTE — PROGRESS NOTES
"Geriatric Progress Note  Chief Complaint   Patient presents with   • Diarrhea     X 3 days, this pt has a strong medical history, she appears alert but does not know why she has this very runny diarrhea, denies antibiotic use. All started with abd cramping       HPI:74 year old patient with extensive PMH including: protient C and S deficiency, DVT, PE, S/P IVC filter placement,RA, LE Myelopathy, Hypothyroidism, CKD, COPD, CHF, LBBB, anemia and  hyperlipidemia was admitted on 7/9/19 for abdominal pain, diarrhea and dehydration. She was previously on Hospice with diagnosis of failure to thrive. However per chart review she has been discharged from Hospice after 6 months. Patient apparently was on Methadone and laxatives at the time of admission. Methadone has been stopped. Geriatrics has been consulted for delirium.    Subjective:  Patient is complaining of bilateral LE pain, mainly from knees to toes. It  is chronic. She is unable to walk due to myelopathy. She states that had 8 watery BM today. However her nurse did not confirm it and stated that she has had few formed stool since this morning. Patient denies any abdominal pain.    ROS: AS in HPI and Subjective. All others reviewed and were negative.    Objective:  Physical Exam:   /59   Pulse 75   Temp 36.7 °C (98 °F) (Oral)   Resp 18   Ht 1.778 m (5' 10\")   Wt 76.5 kg (168 lb 10.4 oz)   SpO2 97%   BMI 24.20 kg/m²     Intake/Output Summary (Last 24 hours) at 07/15/19 1003  Last data filed at 07/14/19 1400   Gross per 24 hour   Intake             1020 ml   Output                0 ml   Net             1020 ml       HEENT:extraoccular muscles in tact. No scleral jaundice,   No ulcer or erythema in oral cavity  CV: RRR, No gallops  Lungs: CTAB, No wheeze, no crackles   Abdomen soft BS(+), No tenderness, no guarding, no rigidity  Ext: trace edema bilaterally. Warm to touch. No erythema  Neurological Exam: no focal deficits, mild tremor.   Psych: normal mood " and affect, no agitation    CAM (Delirium Screen) :1 /4, Negative for Delirium  Orientation: abnormal, didnot remember the date and the month. However, remembered the year and the place.  Attention: normal. Could spell WORLD backward.  Judgment:normal  Alertness:normal        Labs:  Results for MATTY PUENTE (MRN [id number]) as of 7/15/2019 09:56   Ref. Range 7/10/2019 06:51   WBC Latest Ref Range: 4.8 - 10.8 K/uL 10.9 (H)   RBC Latest Ref Range: 4.20 - 5.40 M/uL 3.43 (L)   Hemoglobin Latest Ref Range: 12.0 - 16.0 g/dL 10.0 (L)   Hematocrit Latest Ref Range: 37.0 - 47.0 % 32.4 (L)   MCV Latest Ref Range: 81.4 - 97.8 fL 94.5   MCH Latest Ref Range: 27.0 - 33.0 pg 29.2   MCHC Latest Ref Range: 33.6 - 35.0 g/dL 30.9 (L)   RDW Latest Ref Range: 35.9 - 50.0 fL 49.8   Platelet Count Latest Ref Range: 164 - 446 K/uL 263   MPV Latest Ref Range: 9.0 - 12.9 fL 10.0   Neutrophils-Polys Latest Ref Range: 44.00 - 72.00 % 71.90   Neutrophils (Absolute) Latest Ref Range: 2.00 - 7.15 K/uL 7.87 (H)   Lymphocytes Latest Ref Range: 22.00 - 41.00 % 15.50 (L)   Lymphs (Absolute) Latest Ref Range: 1.00 - 4.80 K/uL 1.69   Monocytes Latest Ref Range: 0.00 - 13.40 % 7.00   Monos (Absolute) Latest Ref Range: 0.00 - 0.85 K/uL 0.76   Eosinophils Latest Ref Range: 0.00 - 6.90 % 4.70   Eos (Absolute) Latest Ref Range: 0.00 - 0.51 K/uL 0.51   Basophils Latest Ref Range: 0.00 - 1.80 % 0.50   Baso (Absolute) Latest Ref Range: 0.00 - 0.12 K/uL 0.05   Immature Granulocytes Latest Ref Range: 0.00 - 0.90 % 0.40   Immature Granulocytes (abs) Latest Ref Range: 0.00 - 0.11 K/uL 0.04   Nucleated RBC Latest Units: /100 WBC 0.00   NRBC (Absolute) Latest Units: K/uL 0.00   Sodium Latest Ref Range: 135 - 145 mmol/L 139   Potassium Latest Ref Range: 3.6 - 5.5 mmol/L 3.7   Chloride Latest Ref Range: 96 - 112 mmol/L 112   Co2 Latest Ref Range: 20 - 33 mmol/L 19 (L)   Anion Gap Latest Ref Range: 0.0 - 11.9  8.0   Glucose Latest Ref Range: 65 - 99 mg/dL 84   Bun  Latest Ref Range: 8 - 22 mg/dL 24 (H)   Creatinine Latest Ref Range: 0.50 - 1.40 mg/dL 1.13   GFR If  Latest Ref Range: >60 mL/min/1.73 m 2 57 (A)   GFR If Non  Latest Ref Range: >60 mL/min/1.73 m 2 47 (A)   Calcium Latest Ref Range: 8.4 - 10.2 mg/dL 7.9 (L)       Results for MATTY PUENTE (MRN [id number]) as of 7/15/2019 09:56   Ref. Range 7/11/2019 04:19   Sodium Latest Ref Range: 135 - 145 mmol/L 141   Potassium Latest Ref Range: 3.6 - 5.5 mmol/L 3.4 (L)   Chloride Latest Ref Range: 96 - 112 mmol/L 112   Co2 Latest Ref Range: 20 - 33 mmol/L 19 (L)   Anion Gap Latest Ref Range: 0.0 - 11.9  10.0   Glucose Latest Ref Range: 65 - 99 mg/dL 80   Bun Latest Ref Range: 8 - 22 mg/dL 21   Creatinine Latest Ref Range: 0.50 - 1.40 mg/dL 1.13   GFR If  Latest Ref Range: >60 mL/min/1.73 m 2 57 (A)   GFR If Non  Latest Ref Range: >60 mL/min/1.73 m 2 47 (A)   Calcium Latest Ref Range: 8.4 - 10.2 mg/dL 8.0 (L)     Imaging: CT abdomen 7/9/19:  Impression           1. No acute abnormality.  2. Prior cholecystectomy.  3. Additional findings as detailed.       EKG: today:  QTc is 481. LBBB.    Assessment and Plan:   Delirium;  74 year old with multiple comorbidities was admitted on 7/9 with abdominal pain and diarrhea. Patient has been deliriuos during admission which is most probably due to medication (Methadone) withdrawal, electrolyte abnormality, anemia, multiple commodities and new environment ( hospital admission). Pain also can cause delirium in elderly. Psych was consulted regarding bupropion. Psych signed off after recommending not to change patient's bupropion. Her delirium has resolved today (CAM is 1/4) and according to her nurse she did not require a sitter last night. Pain apparently under better control with gabapentin and tylenol. Continue same management and might need increase in gabapentin if pain increases.      Currently patient's QTc is prolonged at  481. Most antipsychotics including quetiapine can prolong QT. If it is neccessary to use an antipsychotic medication, QT should be followed closely. Please avoid multiple medication that can cause QT prolongation. Zofran can also increase QT.    Electrolyte abnormality and anemia can also cause delirium. I would recommend follow up on her labs, specially that she had some hyponatremia on  7/11 and she has CKD.    Per chart review she has COPD and sleep apnea,Please make sure no hypoxemia specially at night.    Urinary retension and constipation can also cause delirium. I agree with stopping Oxybutynin and avoiding meds with anticholinergic effect. However, I would recommend making sure no urinary retention (check PVR).    TSH was elevated on 7/12. I would recommend checking FT4 and repeating TSH. If still abnormal to adjust levothyroxine dosage. Please also apply universal delirium prevention / treatment recommendations outlined in 's note.       We will continue to follow that patient along with you  Annie Rothman M.D.  Geriatrics- [facility]  Please feel free to contact us with any questions.  Extension [phone]   8:00-5:00 Monday - Friday (excluding holidays)

## 2019-07-15 NOTE — CARE PLAN
Problem: Safety  Goal: Will remain free from falls  Outcome: PROGRESSING AS EXPECTED    Intervention: Implement fall precautions  Room/floor clear. Non skid socks on. Proper signs up. Bed alarm on, bed low and locked.  Call light and belongings within reach.  Hourly rounding in place to make sure needs are met.      Problem: Skin Integrity  Goal: Risk for impaired skin integrity will decrease  Outcome: PROGRESSING AS EXPECTED    Intervention: Implement precautions to protect skin integrity in collaboration with the interdisciplinary team  Assess skin qshift for skin integrity and breakdown. Barrier cream applied.

## 2019-07-15 NOTE — THERAPY
"Physical Therapy Evaluation completed.   Bed Mobility:  Supine to Sit: Moderate Assist  Transfers: Sit to Stand: Refused  Gait: Level Of Assist: Unable to Participate with Will Continue to Assess for Equipment Needs       Plan of Care: Will benefit from Physical Therapy 2 times per week  Discharge Recommendations: Equipment: Will Continue to Assess for Equipment Needs. Recommend post-acute placement for continued physical therapy services prior to discharge home. Patient can tolerate post-acute therapies at a 5x/week frequency.       See \"Rehab Therapy-Acute\" Patient Summary Report for complete documentation.     Pt was recently admitted for failure to thrive and stomach pain/cramps. Pt presented to PT with impaired balance, weakness, pain, impaired cognition, and dec activity tolerance. Pt was primarily self limiting and requires lots of encouargement to participate with therapy evaluation. Pt was able to demonstrate Mod A for bed mobility, however, once pt was EOB she attempted to stand once and c/o L knee and ankle pain and refused to attempt any further mobility assessments. Pt appears to be confused and presents with an odd affect at times. Pt educated on consequences of immobilization, however, she continued to refuse and reports she needs her doctor to come check her knee and ankle prior to standing. Pt will continue to benefit from skilled PT while in house, with recommendation for post acute therapy prior to dc home given current objective findings, age, PLOF and limited social support. Will continue to follow and update POC.   "

## 2019-07-15 NOTE — WOUND TEAM
"[facility] Wound & Ostomy Care  Inpatient Services  Wound and Skin Care Progress Note    Admission Date:  7/9/2019   HPI, PMH, SH: Reviewed  Unit where seen by Wound Team: 2208/00    WOUND TEAM FOLLOW UP: pressure injury mgmt    SUBJECTIVE:  \"Okay.\"     Self Report / Pain Level:c/o pain L knee      OBJECTIVE: on waffle overlay, heels floated with pillows  WOUND TYPE, LOCATION, CHARACTERISTICS (Pressure ulcers: location, stage, POA or date identified)  Wound 07/09/19 Partial Thickness Wound Buttocks;Coccyx;Sacrum (Active)          7/15/2019  8:40 AM   Site Assessment Red;Pink    Tiff-wound Assessment Fragile    Margins Defined edges    Wound Length (cm) 7.5 cm 7/10/2019 10:15 AM   Wound Width (cm) 7.5 cm 7/10/2019 10:15 AM   Wound Depth (cm) 0.1 cm 7/10/2019 10:15 AM   Wound Surface Area (cm^2) 56.25 cm^2 7/10/2019 10:15 AM   Tunneling 0 cm 7/15/2019  8:40 AM   Undermining 0 cm 7/15/2019  8:40 AM   Drainage Amount None    Treatments Moisturizing cream    Cleansing Normal Saline Irrigation    Periwound Protectant Skin Protectant wipes to Periwound    Dressing Options Open to Air    Dressing Changed Other (Comment)    Dressing Status Clean;Dry;Intact    Dressing Change Frequency As Needed    NEXT Weekly Photo (Inpatient Only) 07/17/19    Odor None     Exposed Structures None     Tissue Type and Percentage red/pink/purple      Vascular:  Wounds not r/t vascular problem  Lab Values:    WBC:       WBC   Date/Time Value Ref Range Status   07/10/2019 06:51 AM 10.9 (H) 4.8 - 10.8 K/uL Final   10/17/2012 09:40 AM 6.4 4.0 - 10.5 x10E3/uL Final     AIC:      Lab Results   Component Value Date/Time    HBA1C 5.5 10/04/2018 11:26 PM          Culture:  na    INTERVENTIONS BY WOUND TEAM:pt turned to L side, removed mepilex since pt had BM. Cleaned wounds with NS, dried. Discussed with RN to apply piece of hydrocolloid over coccyx wound, then cover with tegaderm. No Sting barrier applied to affected area. Hydrocolloid ordered.   Dressing " Options: Open to Air    Interdisciplinary consultation:   With Nursing;With Patient     EVALUATION: Pt's wound to area has improved.  There is start of moisture fissure from just proximal to anus to coccyx. Coccyx has same red/puplish area that was POA last week present. It is still 0.5 x 0.5 cm. Discolored area still present to R 1st MTH. Pt has slightly improved and able to tolerate turning better without as much pain.    Factors affecting wound healing: CKD, opioid dependence, decreased nutrition, rheumatoid arthritis  Goals:  Decrease in wound area and depth weekly     NURSING PLAN OF CARE:    Dressing changes: Continue previous Dressing Maintenance orders:        See new Dressing Maintenance orders:       Skin care: See Skin Care orders:  x      Rectal tube care: See Rectal Tube Care orders:      Other orders:           WOUND TEAM PLAN OF CARE (X):   NPWT change 3 x week:        Dressing changes:       Follow up as needed:  If wound worsens     Other:    Anticipated discharge plans (X):  SNF:           Home Care:           Outpatient Wound Center:            Self Care:            Other:   Was on Home hospice       .

## 2019-07-15 NOTE — PROGRESS NOTES
Hospital Medicine Daily Progress Note    Date of Service  7/15/2019    Chief Complaint  74 y.o. female admitted 7/9/2019 with diarrhea    Hospital Course    Patient is a 74 year old with an extensive medical history including chronic pain and debility who has been on home hospice.  Reportedly she received multiple laxative for treatment of constipation and was brought in with subsequent diarrhea, dehydration and worsening confusion.        Interval Problem Update  Much improved today, behavior and mentation  axox to person, place, situation and month, not year,  Minimal leg pain  Cooperative and calm  Ros otherwise negative    Consultants/Specialty  Hospice  Geriatrics  Psych    Code Status  DNR/ DNI    Disposition    Pt's son was suspected of diverting her narcotics and was just incarcerated- Group home, her other son in [place] does not want her to return home with him  Social work trying to place her in a group home    Review of Systems  Review of Systems   Unable to perform ROS: Critical illness   Constitutional: Negative.    HENT: Negative.    Eyes: Negative.  Negative for blurred vision.   Respiratory: Negative.  Negative for cough and shortness of breath.    Cardiovascular: Negative.  Negative for chest pain, palpitations and leg swelling.   Gastrointestinal: Negative.  Negative for abdominal pain, nausea and vomiting.   Genitourinary: Negative.  Negative for dysuria.   Musculoskeletal: Negative for joint pain and myalgias.   Skin: Negative.  Negative for itching and rash.   Neurological: Negative.  Negative for dizziness, focal weakness and headaches.   Endo/Heme/Allergies: Negative.  Does not bruise/bleed easily.   Psychiatric/Behavioral: Negative.  Negative for depression, substance abuse and suicidal ideas.   All other systems reviewed and are negative.       Physical Exam  Temp:  [36.7 °C (98 °F)-37.1 °C (98.8 °F)] 36.7 °C (98 °F)  Pulse:  [61-75] 75  Resp:  [18] 18  BP: (149-159)/(59-80)  149/59  SpO2:  [97 %-100 %] 97 %    Physical Exam   Constitutional: She is oriented to person, place, and time. She appears well-developed and well-nourished. No distress.   HENT:   Head: Normocephalic and atraumatic.   Mouth/Throat: Oropharynx is clear and moist.   Eyes: Conjunctivae are normal.   Neck: Normal range of motion. Neck supple.   Cardiovascular: Normal rate, normal heart sounds and intact distal pulses.  Exam reveals no gallop and no friction rub.    No murmur heard.  Pulmonary/Chest: Effort normal and breath sounds normal. No respiratory distress. She has no wheezes. She has no rales. She exhibits no tenderness.   Abdominal: Soft. Bowel sounds are normal. She exhibits no distension and no mass. There is no tenderness. There is no rebound and no guarding.   Musculoskeletal: Normal range of motion. She exhibits no edema or tenderness.   L knee continues to improve   Neurological: She is alert and oriented to person, place, and time. She has normal reflexes. No cranial nerve deficit. She exhibits normal muscle tone. Coordination normal.   axox2   Skin: Skin is warm and dry. No rash noted. She is not diaphoretic. No erythema. No pallor.   Nursing note and vitals reviewed.      Fluids  No intake or output data in the 24 hours ending 07/15/19 1420    Laboratory      Recent Labs      07/13/19   0810   SODIUM  141   POTASSIUM  4.3   CHLORIDE  116*   CO2  18*   GLUCOSE  91   BUN  13   CREATININE  1.09   CALCIUM  8.5                   Imaging  DX-KNEE 3 VIEWS LEFT   Final Result         1.  No acute traumatic bony injury.   2.  Atherosclerosis      CT-ABDOMEN-PELVIS W/O   Final Result            1. No acute abnormality.   2. Prior cholecystectomy.   3. Additional findings as detailed.                      Assessment/Plan  CKD (chronic kidney disease) stage 3, GFR 30-59 ml/min ([facility])- (present on admission)   Assessment & Plan    Improved with fluids       Depression   Assessment & Plan    Seen by psych per  geriatrics recommendation and they advice that her wellbutrin be continued     Diarrhea due to drug   Assessment & Plan    Initially resolved  C dif negative  Likely due to laxatives  Now recurrence after miriaax - it is improving  following     Opioid dependence with delirium (HCC)- (present on admission)   Assessment & Plan    Declined by hospice as she was just released from hospice after completing their 6 month limit  No further evidence of opiate w/d   Exam improving and pain improving, continue lidocaine patch prn and gabapentin  Continue to follow            VTE prophylaxis: heparin

## 2019-07-16 PROCEDURE — A9270 NON-COVERED ITEM OR SERVICE: HCPCS | Performed by: INTERNAL MEDICINE

## 2019-07-16 PROCEDURE — 700102 HCHG RX REV CODE 250 W/ 637 OVERRIDE(OP): Performed by: INTERNAL MEDICINE

## 2019-07-16 PROCEDURE — G0378 HOSPITAL OBSERVATION PER HR: HCPCS

## 2019-07-16 PROCEDURE — 700111 HCHG RX REV CODE 636 W/ 250 OVERRIDE (IP): Performed by: INTERNAL MEDICINE

## 2019-07-16 PROCEDURE — 700102 HCHG RX REV CODE 250 W/ 637 OVERRIDE(OP): Performed by: HOSPITALIST

## 2019-07-16 PROCEDURE — 99226 PR SUBSEQUENT OBSERVATION CARE,LEVEL III: CPT | Performed by: INTERNAL MEDICINE

## 2019-07-16 PROCEDURE — 99213 OFFICE O/P EST LOW 20 MIN: CPT | Performed by: INTERNAL MEDICINE

## 2019-07-16 PROCEDURE — 96372 THER/PROPH/DIAG INJ SC/IM: CPT

## 2019-07-16 PROCEDURE — A9270 NON-COVERED ITEM OR SERVICE: HCPCS | Performed by: HOSPITALIST

## 2019-07-16 PROCEDURE — 700101 HCHG RX REV CODE 250: Performed by: HOSPITALIST

## 2019-07-16 RX ORDER — ACETAMINOPHEN 325 MG/1
975 TABLET ORAL 3 TIMES DAILY
Status: DISCONTINUED | OUTPATIENT
Start: 2019-07-16 | End: 2019-07-16

## 2019-07-16 RX ORDER — METHADONE HYDROCHLORIDE 5 MG/1
5 TABLET ORAL 2 TIMES DAILY
Status: DISCONTINUED | OUTPATIENT
Start: 2019-07-16 | End: 2019-07-16

## 2019-07-16 RX ADMIN — BUDESONIDE AND FORMOTEROL FUMARATE DIHYDRATE 2 PUFF: 160; 4.5 AEROSOL RESPIRATORY (INHALATION) at 17:30

## 2019-07-16 RX ADMIN — GABAPENTIN 100 MG: 100 CAPSULE ORAL at 11:22

## 2019-07-16 RX ADMIN — ACETAMINOPHEN 650 MG: 325 TABLET, FILM COATED ORAL at 04:07

## 2019-07-16 RX ADMIN — GABAPENTIN 100 MG: 100 CAPSULE ORAL at 04:07

## 2019-07-16 RX ADMIN — LISINOPRIL 5 MG: 5 TABLET ORAL at 17:30

## 2019-07-16 RX ADMIN — BUPROPION HYDROCHLORIDE 150 MG: 150 TABLET, EXTENDED RELEASE ORAL at 17:30

## 2019-07-16 RX ADMIN — AMMONIUM LACTATE 1 APPLICATION: 12 LOTION TOPICAL at 04:14

## 2019-07-16 RX ADMIN — ENOXAPARIN SODIUM 40 MG: 100 INJECTION SUBCUTANEOUS at 04:07

## 2019-07-16 RX ADMIN — LIDOCAINE 1 PATCH: 50 PATCH TOPICAL at 11:51

## 2019-07-16 RX ADMIN — LEVOTHYROXINE SODIUM 125 MCG: 125 TABLET ORAL at 04:08

## 2019-07-16 RX ADMIN — POLYETHYLENE GLYCOL 3350 1 PACKET: 17 POWDER, FOR SOLUTION ORAL at 06:00

## 2019-07-16 RX ADMIN — BUDESONIDE AND FORMOTEROL FUMARATE DIHYDRATE 2 PUFF: 160; 4.5 AEROSOL RESPIRATORY (INHALATION) at 04:14

## 2019-07-16 RX ADMIN — GABAPENTIN 100 MG: 100 CAPSULE ORAL at 17:30

## 2019-07-16 RX ADMIN — BISOPROLOL FUMARATE 2.5 MG: 10 TABLET ORAL at 04:08

## 2019-07-16 RX ADMIN — NYSTATIN: 100000 POWDER TOPICAL at 17:30

## 2019-07-16 RX ADMIN — LISINOPRIL 5 MG: 5 TABLET ORAL at 04:08

## 2019-07-16 RX ADMIN — NYSTATIN: 100000 POWDER TOPICAL at 04:14

## 2019-07-16 RX ADMIN — SENNOSIDES,DOCUSATE SODIUM 1 TABLET: 8.6; 5 TABLET, FILM COATED ORAL at 04:08

## 2019-07-16 RX ADMIN — BUPROPION HYDROCHLORIDE 150 MG: 150 TABLET, EXTENDED RELEASE ORAL at 04:08

## 2019-07-16 RX ADMIN — POTASSIUM CHLORIDE 20 MEQ: 1500 TABLET, EXTENDED RELEASE ORAL at 04:08

## 2019-07-16 ASSESSMENT — ENCOUNTER SYMPTOMS
VOMITING: 0
NAUSEA: 0
ABDOMINAL PAIN: 0
BRUISES/BLEEDS EASILY: 0
FOCAL WEAKNESS: 0
SHORTNESS OF BREATH: 0
HEADACHES: 0
MEMORY LOSS: 1
PALPITATIONS: 0
MYALGIAS: 1
BLURRED VISION: 0
DIZZINESS: 0
DEPRESSION: 0
COUGH: 0

## 2019-07-16 ASSESSMENT — LIFESTYLE VARIABLES: SUBSTANCE_ABUSE: 0

## 2019-07-16 NOTE — PROGRESS NOTES
"Geriatric Progress Note    Chief Complaint   Patient presents with   • Diarrhea     X 3 days, this pt has a strong medical history, she appears alert but does not know why she has this very runny diarrhea, denies antibiotic use. All started with abd cramping     HPI: 74 year old lady with extensive PMH including DVT/PE, COPD, Sleep apnea, LBBB, RA, Myelopathy of LEs  was admitted to hospital with diarrhea and abdominal pain. She was discharged from hospice recently. She used to be on Methadone for pain which was d/juliette at the time of admission. Patient had delirium during admission. Delirium had resolved yesterday morning. However, per nursing she was again confused last night and could not fall sleep. Continues to have leg pain from knee down.    Subjective:  She is in no distress. complains of bilateral leg pain. Methadone has been stopped. States that gabapentin helps at first but its effect goes away later. She states that she had a a lot of pain last night.    ROS: AS per HPI, All others reviewed and were negative.    Objective:  Physical Exam:   /78   Pulse 73   Temp 36.3 °C (97.4 °F) (Oral)   Resp 18   Ht 1.778 m (5' 10\")   Wt 76.5 kg (168 lb 10.4 oz)   SpO2 99%   BMI 24.20 kg/m²     Intake/Output Summary (Last 24 hours) at 07/16/19 0840  Last data filed at 07/16/19 0200   Gross per 24 hour   Intake               60 ml   Output                0 ml   Net               60 ml       HEENT:extraoccular muscles in tact. No scleral jaundice,   No ulcer or erythema in oral cavity. Mucus membranes are not dry  CV: RRR, No gallops  Lungs: CTAB, No wheeze, no crackles   Abdomen soft BS(+), No tenderness, no guarding, no rigidity  Ext: trace edema bilateraly. Warm to touch. No erythema  Neurological Exam: no focal deficits,has fine tremor over both hands.  Psych: normal mood and affect, no agitation    CAM (Delirium Screen) :1 /4,  Negative for Delirium  Orientation: abnormal, could not remember the date and " the month. Could remember place.  Attention: normal, Could spell WORLD backward.  Judgment:normal  Alertness:normal        Labs:  Results for MATTY PUENTE (MRN [id number]) as of 7/16/2019 08:37   Ref. Range 7/12/2019 09:47   TSH Latest Ref Range: 0.380 - 5.330 uIU/mL 8.100 (H)   Free T-4 Latest Ref Range: 0.58 - 1.64 ng/dL 1.08     Results for MATTY PUENTE (MRN [id number]) as of 7/16/2019 08:37   Ref. Range 7/13/2019 08:10   Sodium Latest Ref Range: 135 - 145 mmol/L 141   Potassium Latest Ref Range: 3.6 - 5.5 mmol/L 4.3   Chloride Latest Ref Range: 96 - 112 mmol/L 116 (H)   Co2 Latest Ref Range: 20 - 33 mmol/L 18 (L)   Anion Gap Latest Ref Range: 0.0 - 11.9  7.0   Glucose Latest Ref Range: 65 - 99 mg/dL 91   Bun Latest Ref Range: 8 - 22 mg/dL 13   Creatinine Latest Ref Range: 0.50 - 1.40 mg/dL 1.09   GFR If  Latest Ref Range: >60 mL/min/1.73 m 2 59 (A)   GFR If Non  Latest Ref Range: >60 mL/min/1.73 m 2 49 (A)   Calcium Latest Ref Range: 8.4 - 10.2 mg/dL 8.5   Results for MATTY PUENTE (MRN [id number]) as of 7/16/2019 08:37   Ref. Range 7/10/2019 06:51   WBC Latest Ref Range: 4.8 - 10.8 K/uL 10.9 (H)   RBC Latest Ref Range: 4.20 - 5.40 M/uL 3.43 (L)   Hemoglobin Latest Ref Range: 12.0 - 16.0 g/dL 10.0 (L)   Hematocrit Latest Ref Range: 37.0 - 47.0 % 32.4 (L)   MCV Latest Ref Range: 81.4 - 97.8 fL 94.5   MCH Latest Ref Range: 27.0 - 33.0 pg 29.2   MCHC Latest Ref Range: 33.6 - 35.0 g/dL 30.9 (L)   RDW Latest Ref Range: 35.9 - 50.0 fL 49.8   Platelet Count Latest Ref Range: 164 - 446 K/uL 263   MPV Latest Ref Range: 9.0 - 12.9 fL 10.0       EKG: yesterday  QTc 481, LBBB    Assessment and Plan:   Delirium: CAM is negative. So no delirium now. Could have waxing and waning so could be present at night. Insomnia and pain can contribute to delirium. I would recommend increasing the dosage of gabapentin.     Insomnia: She was on Trazodone 200 mg prior admission. She states that it works  very well. I would recommend restarting Trazodone with lower dosage like 50- 100 mg at bedtime.    Electrolyte abnormality, anemia can also contribute to delirium.     Hypothyroidism:TSH is elevated. Ft4 is normal. I would recommend rechecking TSH and if abnormal again to adjust the dosage of levothyroxine.    We will continue to follow that patient along with you  Annie Rothman M.D.  Geriatrics- [facility]  Please feel free to contact us with any questions.  Extension [phone]   8:00-5:00 Monday - Friday (excluding holidays)

## 2019-07-16 NOTE — PROGRESS NOTES
Pt still awake. Reports she cant sleep.repositioned for comfort x5 in the last few hours. Pt reports she cant get comfortable.

## 2019-07-16 NOTE — DISCHARGE PLANNING
Received Choice form at [phone]  Agency/Facility Name: [facility] (1) [facility] (2) [facility] (3) [facility] (4) [facility] (5)  Referral sent per Choice form @ [phone]

## 2019-07-16 NOTE — PROGRESS NOTES
Pt awake, lying in bed repostioning the sheets over and over. Encouraged to try and close her eyes.

## 2019-07-16 NOTE — PROGRESS NOTES
Pt remains confused to place, time and situation. Med compliant. Continent of bladder. Lying in bed watching tv at this time. Night snack given. Safety maintained.

## 2019-07-16 NOTE — PROGRESS NOTES
Pt slept aprox 30 minutes all shift. Confused to place, time and situation. States she is having surgery on her left leg today and son is on his way to be with her. reoriented pt, was not effective.

## 2019-07-16 NOTE — PROGRESS NOTES
Hospital Medicine Daily Progress Note    Date of Service  7/16/2019    Chief Complaint  74 y.o. female admitted 7/9/2019 with diarrhea    Hospital Course    Patient is a 74 year old with an extensive medical history including chronic pain and debility who has been on home hospice.  Reportedly she received multiple laxative for treatment of constipation and was brought in with subsequent diarrhea, dehydration and worsening confusion.        Interval Problem Update  Much improved today, behavior and mentation  axox to person, place, situation and month, not year,  Minimal leg pain  Cooperative and calm  Ros otherwise negative    Consultants/Specialty  Hospice  Geriatrics  Psych    Code Status  DNR/ DNI    Disposition  Difficult Dispo-  Pt's son was suspected of diverting her narcotics and was just incarcerated- Group home, her other son in [place] does not want her to return home with him  Social work trying to place her in a group home    Review of Systems  Review of Systems   Unable to perform ROS: Critical illness   Eyes: Negative for blurred vision.   Respiratory: Negative for cough and shortness of breath.    Cardiovascular: Negative for chest pain, palpitations and leg swelling.   Gastrointestinal: Negative for abdominal pain, nausea and vomiting.   Genitourinary: Negative for dysuria.   Musculoskeletal: Positive for myalgias (Legs). Negative for joint pain.   Skin: Negative for itching and rash.   Neurological: Negative for dizziness, focal weakness and headaches.   Endo/Heme/Allergies: Does not bruise/bleed easily.   Psychiatric/Behavioral: Positive for memory loss. Negative for depression, substance abuse and suicidal ideas.   All other systems reviewed and are negative.       Physical Exam  Temp:  [36.3 °C (97.4 °F)-36.7 °C (98.1 °F)] 36.3 °C (97.4 °F)  Pulse:  [68-80] 73  Resp:  [18] 18  BP: (139-154)/(56-80) 139/78  SpO2:  [96 %-99 %] 99 %    Physical Exam   Constitutional: She is oriented to person,  place, and time. She appears well-developed and well-nourished. No distress.   HENT:   Head: Normocephalic and atraumatic.   Mouth/Throat: Oropharynx is clear and moist.   Eyes: Conjunctivae are normal.   Neck: Normal range of motion. Neck supple.   Cardiovascular: Normal rate, normal heart sounds and intact distal pulses.  Exam reveals no gallop and no friction rub.    No murmur heard.  Pulmonary/Chest: Effort normal and breath sounds normal. No respiratory distress. She has no wheezes. She has no rales. She exhibits no tenderness.   Abdominal: Soft. Bowel sounds are normal. She exhibits no distension and no mass. There is no tenderness. There is no rebound and no guarding.   Musculoskeletal: Normal range of motion. She exhibits no edema or tenderness.   L knee continues to improve   Neurological: She is alert and oriented to person, place, and time. She has normal reflexes. No cranial nerve deficit. She exhibits normal muscle tone. Coordination normal.   axox2   Skin: Skin is warm and dry. No rash noted. She is not diaphoretic. No erythema. No pallor.   Psychiatric: Her speech is tangential. Cognition and memory are impaired. She exhibits abnormal recent memory and abnormal remote memory.   Nursing note and vitals reviewed.      Fluids    Intake/Output Summary (Last 24 hours) at 07/16/19 1251  Last data filed at 07/16/19 0200   Gross per 24 hour   Intake               60 ml   Output                0 ml   Net               60 ml       Laboratory                        Imaging  DX-KNEE 3 VIEWS LEFT   Final Result         1.  No acute traumatic bony injury.   2.  Atherosclerosis      CT-ABDOMEN-PELVIS W/O   Final Result            1. No acute abnormality.   2. Prior cholecystectomy.   3. Additional findings as detailed.                      Assessment/Plan  CKD (chronic kidney disease) stage 3, GFR 30-59 ml/min ([facility])- (present on admission)   Assessment & Plan    Improved with fluids  Only check labs if needed      Dementia   Assessment & Plan    She is a poor historian, unable to take care of herself and having difficulty with her son as a caregiver.  She had confusion and this is improving after holding several medications including Xanax, trazodone, oxybutynin and methadone  Social work is assisting with placement  Continue wellbutrin     Depression   Assessment & Plan    Seen by psych per geriatrics recommendation and they advice that her wellbutrin be continued     Diarrhea due to drug   Assessment & Plan    C dif negative  Likely due to laxatives  Now recurrence after miriaax - it is improving  following     Opioid dependence with delirium (HCC)- (present on admission)   Assessment & Plan    Declined by hospice as she was just released from hospice after completing their 6 month limit  No further evidence of opiate w/d, She has been weaned from methadone and is requesting pain rx.  Exam improving and pain improving  Continue, scheduled tylenol, lidocaine patch prn and gabapentin  Continue to follow            VTE prophylaxis: heparin

## 2019-07-16 NOTE — ASSESSMENT & PLAN NOTE
She is a poor historian, unable to take care of herself and having difficulty with her son as a caregiver.  She had confusion and this is improving after holding several medications including Xanax, trazodone, oxybutynin and methadone  Social work is assisting with placement  Continue wellbutrin

## 2019-07-17 VITALS
OXYGEN SATURATION: 100 % | TEMPERATURE: 97.7 F | SYSTOLIC BLOOD PRESSURE: 121 MMHG | HEART RATE: 71 BPM | WEIGHT: 168.65 LBS | HEIGHT: 70 IN | RESPIRATION RATE: 18 BRPM | BODY MASS INDEX: 24.14 KG/M2 | DIASTOLIC BLOOD PRESSURE: 52 MMHG

## 2019-07-17 LAB
ALBUMIN SERPL BCP-MCNC: 3.3 G/DL (ref 3.2–4.9)
BASOPHILS # BLD AUTO: 0.5 % (ref 0–1.8)
BASOPHILS # BLD: 0.05 K/UL (ref 0–0.12)
BUN SERPL-MCNC: 18 MG/DL (ref 8–22)
CALCIUM SERPL-MCNC: 8.9 MG/DL (ref 8.4–10.2)
CHLORIDE SERPL-SCNC: 106 MMOL/L (ref 96–112)
CO2 SERPL-SCNC: 25 MMOL/L (ref 20–33)
CREAT SERPL-MCNC: 1.09 MG/DL (ref 0.5–1.4)
EOSINOPHIL # BLD AUTO: 0.46 K/UL (ref 0–0.51)
EOSINOPHIL NFR BLD: 5 % (ref 0–6.9)
ERYTHROCYTE [DISTWIDTH] IN BLOOD BY AUTOMATED COUNT: 54.1 FL (ref 35.9–50)
GLUCOSE SERPL-MCNC: 107 MG/DL (ref 65–99)
HCT VFR BLD AUTO: 42.6 % (ref 37–47)
HGB BLD-MCNC: 12.9 G/DL (ref 12–16)
IMM GRANULOCYTES # BLD AUTO: 0.11 K/UL (ref 0–0.11)
IMM GRANULOCYTES NFR BLD AUTO: 1.2 % (ref 0–0.9)
LYMPHOCYTES # BLD AUTO: 1.93 K/UL (ref 1–4.8)
LYMPHOCYTES NFR BLD: 20.9 % (ref 22–41)
MCH RBC QN AUTO: 29 PG (ref 27–33)
MCHC RBC AUTO-ENTMCNC: 30.3 G/DL (ref 33.6–35)
MCV RBC AUTO: 95.7 FL (ref 81.4–97.8)
MONOCYTES # BLD AUTO: 0.84 K/UL (ref 0–0.85)
MONOCYTES NFR BLD AUTO: 9.1 % (ref 0–13.4)
NEUTROPHILS # BLD AUTO: 5.83 K/UL (ref 2–7.15)
NEUTROPHILS NFR BLD: 63.3 % (ref 44–72)
NRBC # BLD AUTO: 0 K/UL
NRBC BLD-RTO: 0 /100 WBC
PHOSPHATE SERPL-MCNC: 4.8 MG/DL (ref 2.5–4.5)
PLATELET # BLD AUTO: 318 K/UL (ref 164–446)
PMV BLD AUTO: 11.6 FL (ref 9–12.9)
POTASSIUM SERPL-SCNC: 6 MMOL/L (ref 3.6–5.5)
RBC # BLD AUTO: 4.45 M/UL (ref 4.2–5.4)
SODIUM SERPL-SCNC: 139 MMOL/L (ref 135–145)
WBC # BLD AUTO: 9.2 K/UL (ref 4.8–10.8)

## 2019-07-17 PROCEDURE — 700111 HCHG RX REV CODE 636 W/ 250 OVERRIDE (IP): Performed by: INTERNAL MEDICINE

## 2019-07-17 PROCEDURE — 700102 HCHG RX REV CODE 250 W/ 637 OVERRIDE(OP): Performed by: INTERNAL MEDICINE

## 2019-07-17 PROCEDURE — A9270 NON-COVERED ITEM OR SERVICE: HCPCS | Performed by: HOSPITALIST

## 2019-07-17 PROCEDURE — G0378 HOSPITAL OBSERVATION PER HR: HCPCS

## 2019-07-17 PROCEDURE — 80069 RENAL FUNCTION PANEL: CPT

## 2019-07-17 PROCEDURE — A9270 NON-COVERED ITEM OR SERVICE: HCPCS | Performed by: INTERNAL MEDICINE

## 2019-07-17 PROCEDURE — 700102 HCHG RX REV CODE 250 W/ 637 OVERRIDE(OP): Performed by: HOSPITALIST

## 2019-07-17 PROCEDURE — 36415 COLL VENOUS BLD VENIPUNCTURE: CPT

## 2019-07-17 PROCEDURE — 700101 HCHG RX REV CODE 250: Performed by: HOSPITALIST

## 2019-07-17 PROCEDURE — 99217 PR OBSERVATION CARE DISCHARGE: CPT | Performed by: INTERNAL MEDICINE

## 2019-07-17 PROCEDURE — 11721 DEBRIDE NAIL 6 OR MORE: CPT

## 2019-07-17 PROCEDURE — 96372 THER/PROPH/DIAG INJ SC/IM: CPT

## 2019-07-17 PROCEDURE — 85025 COMPLETE CBC W/AUTO DIFF WBC: CPT

## 2019-07-17 RX ORDER — NYSTATIN 100000 [USP'U]/G
1 POWDER TOPICAL 4 TIMES DAILY
Qty: 15 G | Refills: 1
Start: 2019-07-17

## 2019-07-17 RX ORDER — ACETAMINOPHEN 325 MG/1
650 TABLET ORAL 3 TIMES DAILY
Qty: 30 TAB | Refills: 0 | Status: SHIPPED | OUTPATIENT
Start: 2019-07-17

## 2019-07-17 RX ORDER — GABAPENTIN 100 MG/1
100 CAPSULE ORAL 3 TIMES DAILY
Qty: 90 CAP
Start: 2019-07-17

## 2019-07-17 RX ADMIN — BUDESONIDE AND FORMOTEROL FUMARATE DIHYDRATE 2 PUFF: 160; 4.5 AEROSOL RESPIRATORY (INHALATION) at 04:32

## 2019-07-17 RX ADMIN — ACETAMINOPHEN 650 MG: 325 TABLET, FILM COATED ORAL at 12:40

## 2019-07-17 RX ADMIN — LIDOCAINE 1 PATCH: 50 PATCH TOPICAL at 12:40

## 2019-07-17 RX ADMIN — GABAPENTIN 100 MG: 100 CAPSULE ORAL at 04:32

## 2019-07-17 RX ADMIN — POLYETHYLENE GLYCOL 3350 1 PACKET: 17 POWDER, FOR SOLUTION ORAL at 06:00

## 2019-07-17 RX ADMIN — ACETAMINOPHEN 650 MG: 325 TABLET, FILM COATED ORAL at 04:31

## 2019-07-17 RX ADMIN — AMMONIUM LACTATE 1 APPLICATION: 12 LOTION TOPICAL at 04:32

## 2019-07-17 RX ADMIN — LEVOTHYROXINE SODIUM 125 MCG: 125 TABLET ORAL at 04:32

## 2019-07-17 RX ADMIN — ENOXAPARIN SODIUM 40 MG: 100 INJECTION SUBCUTANEOUS at 06:00

## 2019-07-17 RX ADMIN — NYSTATIN: 100000 POWDER TOPICAL at 04:32

## 2019-07-17 RX ADMIN — LISINOPRIL 5 MG: 5 TABLET ORAL at 04:33

## 2019-07-17 RX ADMIN — SENNOSIDES,DOCUSATE SODIUM 1 TABLET: 8.6; 5 TABLET, FILM COATED ORAL at 04:32

## 2019-07-17 RX ADMIN — BUPROPION HYDROCHLORIDE 150 MG: 150 TABLET, EXTENDED RELEASE ORAL at 04:33

## 2019-07-17 RX ADMIN — BISOPROLOL FUMARATE 2.5 MG: 10 TABLET ORAL at 04:32

## 2019-07-17 RX ADMIN — POTASSIUM CHLORIDE 20 MEQ: 1500 TABLET, EXTENDED RELEASE ORAL at 04:31

## 2019-07-17 RX ADMIN — GABAPENTIN 100 MG: 100 CAPSULE ORAL at 12:40

## 2019-07-17 NOTE — DISCHARGE PLANNING
Received Transport Form @ 8327  Spoke to Tawnya @ [place]    Transport is scheduled for 7/17 @1600 going to [place].

## 2019-07-17 NOTE — CARE PLAN
Problem: Nutritional:  Goal: Achieve adequate nutritional intake  Patient will consume 50-75% of meals   Outcome: PROGRESSING AS EXPECTED  Most recent PO intake improved at % x 1 and 50-75% x 2 meals.

## 2019-07-17 NOTE — DISCHARGE PLANNING
Agency/Facility Name: [facility]  Spoke To: Tawnya  Outcome: Patient accepted.     Agency/Facility Name: [facility]  Outcome: Patient accepted.

## 2019-07-17 NOTE — PROGRESS NOTES
Discharging patient to [facility] per MD order.  Pt demonstrated understanding of discharge instructions, follow up appointments, home medications, prescriptions. Pt is barely able to stand edge of bed with fww, and needed X2-3 people to pivot to wheelchair, voiding without difficulty, pain well controlled, tolerating oral medications, tolerating diet. Pt verbalized understanding of discharge instructions and educational handouts, all questions answered.  Pt discharged off unit with hospital escort at 1610.

## 2019-07-17 NOTE — DISCHARGE PLANNING
Agency/Facility Name: [facility]  Spoke To: Tawnya (RAUL)  Outcome: Attempted to get status in referral, however, there was no answer left message.

## 2019-07-17 NOTE — WOUND TEAM
Pt seen for foot/nail care. Toenails noted to be slightly overgrown and mycotic. Toes wrapped with soapy, wet washcloths prior to trimming and filing of nails. Trimming and filing completed without difficulty, no knicks or cuts. Moisturizer applied to both feet. Slipper socks not applied per pt request, pt not ambulating.

## 2019-07-17 NOTE — PROGRESS NOTES
Received report from Kassidy FLORIAN. Assumed care. This pt is AOx1, denies pain, Patient and RN discussed plan of care including waiting on placement, pain management,: questions answered. Chart reviewed. Call light in place, fall precautions in place, patient educated on importance of calling for assistance. No additional needs at this time.

## 2019-07-17 NOTE — DISCHARGE SUMMARY
Discharge Summary    CHIEF COMPLAINT ON ADMISSION  Chief Complaint   Patient presents with   • Diarrhea     X 3 days, this pt has a strong medical history, she appears alert but does not know why she has this very runny diarrhea, denies antibiotic use. All started with abd cramping       Reason for Admission  EMS     Admission Date  7/9/2019    CODE STATUS  DNAR/DNI    HPI & HOSPITAL COURSE  This is a 74 y.o. female with an extensive medical history including dementia, chronic pain and debility who has been on home hospice.  Her two sons have been taking care of her however there has been a question of them possibly diverting or using her controlled substances which include methadone and benzodiazepines.  Reportedly, she received multiple laxative for treatment of constipation and was brought in with subsequent diarrhea, dehydration and worsening confusion.  She was thought to have some of these effects related to opiate therapy therefore her methadone was discontinued.  Her benzodiazepines were also slowly weaned as they were thought very likely contributing to her overall generalized weakness and worsening confusion.  She appeared dehydrated and was provided with normal saline.  She had no evidence of infection.    Gabapentin was added for pain control and she was provided with scheduled Tylenol.  She did complain of intermittent pain however it was tolerable.  She worked with PT OT who recommended a skilled nursing facility.  Due to her advanced age and previously being on hospice as well as her high fall risk, her Coumadin was not continued during her hospital stay.  It does not appear she was taking this as an outpatient prior to her admission.  She has a hx of inconsistent INR values in the past and is a high fall risk therefore this will not be continued on discharge.    It was determined that she would discharge to a skilled nursing facility and will continue to undergo physical rehabilitation.    Therefore,  she is discharged in fair and stable condition to skilled nursing facility.    The patient met 2-midnight criteria for an inpatient stay at the time of discharge.    Discharge Date  7/17/2019    FOLLOW UP ITEMS POST DISCHARGE  Follow-up with PCP as needed    DISCHARGE DIAGNOSES  Active Problems:    CKD (chronic kidney disease) stage 3, GFR 30-59 ml/min ([facility]) POA: Yes    Opioid dependence with delirium ([facility]) POA: Yes    Diarrhea due to drug POA: Unknown    Depression POA: Unknown    Dementia POA: Unknown  Resolved Problems:    * No resolved hospital problems. *      FOLLOW UP  No future appointments.  No follow-up provider specified.    MEDICATIONS ON DISCHARGE     Medication List      START taking these medications      Instructions   acetaminophen 325 MG Tabs  Commonly known as:  TYLENOL   Take 2 Tabs by mouth 3 times a day.  Dose:  650 mg     gabapentin 100 MG Caps  Commonly known as:  NEURONTIN   Take 1 Cap by mouth 3 times a day.  Dose:  100 mg     nystatin powder  Commonly known as:  MYCOSTATIN   Apply 1 g to affected area(s) 4 times a day. Apply to pannus  Dose:  1 g        CONTINUE taking these medications      Instructions   albuterol 108 (90 Base) MCG/ACT Aers inhalation aerosol  Commonly known as:  PROAIR HFA   Inhale 2 Puffs by mouth every 6 hours as needed for Shortness of Breath.  Dose:  2 Puff     ammonium lactate 12 % Lotn  Commonly known as:  LAC-HYDRIN   Apply 1 Application to affected area(s) every day.  Dose:  1 Application     bisoprolol 5 MG Tabs  Commonly known as:  ZEBETA   Take 0.5 Tabs by mouth every day.  Dose:  2.5 mg     budesonide-formoterol 160-4.5 MCG/ACT Aero  Commonly known as:  SYMBICORT   Inhale 2 Puffs by mouth 2 Times a Day.  Dose:  2 Puff     buPROPion 300 MG XL tablet  Commonly known as:  WELLBUTRIN XL   Doctor's comments:  Note dose change  Take 1 Tab by mouth every morning.  Dose:  300 mg     docusate sodium 100 MG Caps  Commonly known as:  COLACE   Take 200 mg by mouth 2  "times a day.  Dose:  200 mg     levothyroxine 125 MCG Tabs  Commonly known as:  SYNTHROID   Take 1 Tab by mouth Every morning on an empty stomach.  Dose:  125 mcg     lisinopril 5 MG Tabs  Commonly known as:  PRINIVIL   Take 1 Tab by mouth 2 times a day.  Dose:  5 mg     NEXIUM 40 MG delayed-release capsule  Generic drug:  esomeprazole   Take 40 mg by mouth as needed.  Dose:  40 mg     sennosides 8.6 MG Tabs  Commonly known as:  SENOKOT   Take 8.6 mg by mouth 1 time daily as needed.  Dose:  8.6 mg     tolterodine ER 4 MG Cp24  Commonly known as:  DETROL LA   Take 4 mg by mouth every evening.  Dose:  4 mg     traZODone 100 MG Tabs  Commonly known as:  DESYREL   Take 200 mg by mouth every evening.  Dose:  200 mg        STOP taking these medications    ALPRAZolam 1 MG Tabs  Commonly known as:  XANAX     amoxicillin 500 MG Caps  Commonly known as:  AMOXIL     methadone 5 MG Tabs  Commonly known as:  DOLOPHINE     warfarin 3 MG Tabs  Commonly known as:  COUMADIN            Allergies  Allergies   Allergen Reactions   • Beta Adrenergic Blockers      depression   • Atorvastatin      \"I fell and hit my head but it could've been the other medications that I was taken.\"   • Ezetimibe      \"I fell and hit my head but it could've been the other medications that I was taken.\"   • Metoprolol      \"I fell and hit my head but it could've been the other medications that I was taken.\"     • Other Environmental    • Pcn [Penicillins]      Per medics   • Spironolactone      Abrupt renal failure   • Statins [Hmg-Coa-R Inhibitors]      \"I fell and hit my head but it could've been the other medications that I was taken.\"   • Tape Hives and Rash     Plastic tape       DIET  Orders Placed This Encounter   Procedures   • Diet Order Regular     Standing Status:   Standing     Number of Occurrences:   1     Order Specific Question:   Diet:     Answer:   Regular [1]     Order Specific Question:   Miscellaneous modifications:     Answer:   " Vegetarian [13]     Comments:   easy to chew foods       ACTIVITY  As tolerated.  Weight bearing as tolerated    CONSULTATIONS  None    PROCEDURES  None    LABORATORY  Lab Results   Component Value Date    SODIUM 139 07/17/2019    POTASSIUM 6.0 (H) 07/17/2019    CHLORIDE 106 07/17/2019    CO2 25 07/17/2019    GLUCOSE 107 (H) 07/17/2019    BUN 18 07/17/2019    CREATININE 1.09 07/17/2019    CREATININE 1.4 06/05/2008        Lab Results   Component Value Date    WBC 9.2 07/17/2019    WBC 6.4 10/17/2012    HEMOGLOBIN 12.9 07/17/2019    HEMATOCRIT 42.6 07/17/2019    PLATELETCT 318 07/17/2019        Total time of the discharge process exceeds 37 minutes.

## 2019-07-17 NOTE — DISCHARGE INSTRUCTIONS
Discharge Instructions    Discharged to [facility] by medical transportation with relative. Discharged via wheelchair, hospital escort: Yes.  Special equipment needed: Wheelchair    Be sure to schedule a follow-up appointment with your primary care doctor or any specialists as instructed.     Discharge Plan:   Diet Plan: Discussed  Activity Level: Discussed  Confirmed Follow up Appointment: Appointment Scheduled  Confirmed Symptoms Management: Discussed  Medication Reconciliation Updated: Yes  Influenza Vaccine Indication: Indicated: Not available from distributor/    I understand that a diet low in cholesterol, fat, and sodium is recommended for good health. Unless I have been given specific instructions below for another diet, I accept this instruction as my diet prescription.   Other diet: regular as tolerated    Special Instructions: None    · Is patient discharged on Warfarin / Coumadin?   No     Depression / Suicide Risk    As you are discharged from this [facility] facility, it is important to learn how to keep safe from harming yourself.    Recognize the warning signs:  · Abrupt changes in personality, positive or negative- including increase in energy   · Giving away possessions  · Change in eating patterns- significant weight changes-  positive or negative  · Change in sleeping patterns- unable to sleep or sleeping all the time   · Unwillingness or inability to communicate  · Depression  · Unusual sadness, discouragement and loneliness  · Talk of wanting to die  · Neglect of personal appearance   · Rebelliousness- reckless behavior  · Withdrawal from people/activities they love  · Confusion- inability to concentrate     If you or a loved one observes any of these behaviors or has concerns about self-harm, here's what you can do:  · Talk about it- your feelings and reasons for harming yourself  · Remove any means that you might use to hurt yourself (examples: pills, rope, extension cords,  firearm)  · Get professional help from the community (Mental Health, Substance Abuse, psychological counseling)  · Do not be alone:Call your Safe Contact- someone whom you trust who will be there for you.  · Call your local CRISIS HOTLINE 955-1721 or 135-295-5016  · Call your local Children's Mobile Crisis Response Team [place] (225) 609-2934 or www.QuVIS  · Call the toll free National Suicide Prevention Hotlines   · National Suicide Prevention Lifeline 573-322-LIUA (9156)  · National Hope Line Network 800-SUICIDE (084-5855)

## 2019-09-26 NOTE — PROGRESS NOTES
Patient:   SYDNEY PIEDRA            MRN: [id number]            FIN: [id number]               Age:   70 years     Sex:  FEMALE     :  47   Associated Diagnoses:   None   Author:   TOM MADRID      Basic Information   History source: Patient, RN.   Medications:  (Selected)   Inpatient Medications  Ordered  Besivance 0.6% ophthalmic suspension 1 drop: Besivance 0.6% ophthalmic suspension 1 drop, 1 drop, Topical, QID, 17 17:00:00, Routine, Suspension  Durezol 0.05% ophthalmic emulsion: Durezol 0.05% ophthalmic emulsion, Durezol 0.05% 1 drop Right eye, Right Eye, BID, 17 17:00:00, Routine  Ilevro 0.3% ophthalmic suspension: Ilevro 0.3% ophthalmic suspension, Ilevro 0.5%1 drop, Right Eye, BID, 17 17:00:00, Routine  ferrous sulfate oral 325 mg tablet [65 mg iron] (Feosol): 325 mg = 1 tab, Oral, BID, 17 17:00:00, Routine, Tab  metFORMIN oral 500 mg tablet: 1,000 mg = 2 tab, Oral, BID [with breakfast & dinner], 17 17:00:00, Routine, Tab  metoprolol tartrate oral 25 mg tablet (Lopressor): 12.5 mg = 0.5 tab, Oral, BID, Hold for HR less than 50 and/or SBP less than 90, 17 17:00:00, Routine, Tab  timolol ophthalmic 0.5% solution (Timoptic): 1 drop, Right Eye, BID, 17 17:00:00, Routine, Ophth Soln  Documented Medications  Documented  Besivance 0.6% ophthalmic suspension: = 1 drop, QID, Right Eye ONLY, Suspension, # 5 mL, Maintenance  Durezol 0.05% ophthalmic emulsion: = 1 drop, Right Eye, BID, Maintenance  Ilevro 0.3% ophthalmic suspension: = 1 drop, Right Eye, BID, Maintenance  Metoprolol Tartrate 25 mg oral tablet: 12.5 mg = 0.5 tab, Oral, BID, Tab, Maintenance  ergocalciferol (vitamin D2) oral 50,000 unit (1.25 mg) capsule: 50,000 unit = 1 cap, Oral, Q , Cap, Maintenance  ferrous sulfate oral 325 mg tablet [65 mg iron] (Feosol): 325 mg = 1 tab, Oral, BID, Tab, Maintenance  metFORMIN oral 1,000 mg tablet: 1,000 mg = 1 tab, Oral, BID, Tab,  MONITOR SUMMARY:     Trigemeny, couplets, PVCs,  And BBB noted.    Maintenance  midodrine oral 2.5 mg tablet (ProAmatine): 2.5 mg = 1 tab, Oral, BID, Tab, Maintenance  timolol ophthalmic 0.5% solution (Timoptic): = 1 drop, Right Eye, BID, Maintenance.      History of Present Illness   [facility] HISTORY AND PHYSICAL    Primary Care Physician: Dr. Caroline Montalvo    Chief Complaint: Chest pain and hypertension    History of Present Illness:    This is a 70 year-old female, patient of Dr. Caroline Montalvo, with a past medical history of anemia, cataracts, diabetes mellitus type 2, parathyroidism, hyperlipidemia, PAD, and TIA who presents with a chief complaint of chest discomfort and hypertension, onset the morning of arrival.     The patient reports that she was feeling fine on day prior to arrival. The morning of arrival the patient took her blood pressure which was found to be highly elevated. She notes that she also began to experience some chest discomfort. Prior to taking her blood pressure, she reports that she had an argument with her neighbor. She reports that she has never had a heart attack or stroke in the past. Her blood pressure has always been controlled while on midodrine and metoprolol which she has been taking over the past 2 years. Due to concern, the patient seeked medical attention at [facility].    Of note, the patient recently underwent a cataract procedure. She notes that she has been recovering as expected.     Upon arrival to the ED, the patient was afebrile, HR 67, RR 16, /85, and SpO2 of 99%. The patient's labs were significant for creatinine (1.16) and glucose (143). The patient's chest XR showed no active process. The patient has been admitted to [facility] for further monitoring.      Review of Systems:  Positive for: None  Negative for: Chest pain, headache, dizziness, shortness of breath, abdominal pain, nausea, vomiting, diarrhea  All systems otherwise negative    Past Medical  History:  Anemia  Cataract  Diabetes mellitus type 2  Parathyroid abnormality  Hyperlipidemia  PAD  TIA    Past Surgical History:   Colonoscopy  EGD  Excision of cyst from breast  Parathyroidectomy  Sigmoidoscopy    Family History:  Father - Glaucoma, hyperthyroidism, MI, PVD  Mother - Diabetes mellitus type 2    Social History:   Denies alcohol use  Denies tobacco use     Allergies (1) Active Reaction  NKA None Documented       Home Medications (9) Active  Besivance 0.6% ophthalmic suspension 1 drop, QID  Durezol 0.05% ophthalmic emulsion 1 drop, Right Eye, BID  ergocalciferol (vitamin D2) oral 50,000 unit (1.25 mg) capsule 50,000 unit = 1 cap, Oral, Q Sunday  ferrous sulfate oral 325 mg tablet [65 mg iron] (Feosol) 325 mg = 1 tab, Oral, BID  Ilevro 0.3% ophthalmic suspension 1 drop, Right Eye, BID  metFORMIN oral 1,000 mg tablet 1,000 mg = 1 tab, Oral, BID  Metoprolol Tartrate 25 mg oral tablet 12.5 mg = 0.5 tab, Oral, BID  midodrine oral 2.5 mg tablet (ProAmatine) 2.5 mg = 1 tab, Oral, BID  timolol ophthalmic 0.5% solution (Timoptic) 1 drop, Right Eye, BID   .        Physical Examination                Vital signs   Vital Signs   12/29/17 15:43 Temperature - VS 36.6 deg_C  Normal    Temperature Source - VS Temporal    Heart/Pulse Rate 70  Normal    Pulse Source Monitor    Respiration Rate 18 breaths/min  HI    SpO2 99 %  Normal    NIBP Systolic 156  HI    NIBP Diastolic 84  Normal    NIBP MAP 99    SN Alarms Set and Appropriate Patient Alarms Set And Appropriate For Patient   .   General:  Alert, no acute distress.    Skin:  Warm.   Back:  Nontender.   Chest wall   Cardiovascular:  Regular rate and rhythm, No murmur.    Respiratory:  Lungs are clear to auscultation, respirations are non-labored.    Gastrointestinal:  Soft, Nontender, Non distended.    Neurological:  Alert and oriented to person, place, time, and situation, No focal neurological deficit observed.    Eye  Extraocular movements are intact.       Medical Decision Making   Results review:    Labs between:  28-DEC-2017 17:40 to 29-DEC-2017 17:40    CBC:                 WBC  HgB  Hct  Plt  MCV  RDW   29-DEC-2017 6.0  13.3  38.6  249  87.3  13.1     DIFF:                 Seg  Neutroph//ABS  Lymph//ABS  Mono//ABS  EOS/ABS   29-DEC-2017 NOT APPLICABLE  59 // 3.5 30 // 1.8 6 // 0.4 4 // 0.2    BMP:                 Na  Cl  BUN  Glu   29-DEC-2017 142  105  15  (H) 105                              K  CO2  Cr  Ca                              4.7  27  (H) 1.16  9.5     CMP:                 AST  ALT  AlkPhos  Bili  Albumin   29-DEC-2017 18  20  61  0.3  4.2     Other Chem:             Mg  Phos  Triglycerides  GGTP  DirectBili                           1.8             COAG:                 INR  PT  PTT  Ddimer  Fibrinogen    29-DEC-2017 1.0  10.1  26                                  Result title:  XR CHEST PORTABLE 1V  Result status:  Final  Verified by:  ROSANA VEGA on 12/29/2017 14:10  IMPRESSION:No active process or significant new finding. .   Rationale:  ASSESSMENT AND PLAN    Chest pain- atypical                    pt has h/o MI in 2016 in [facility]-no details available   XR chest negative                   EKG no acute changes; trop neg x 2   Stress test from June 2017 was normal   Will continue Metoprolol and monitore on tele overnight-d/c home at AM if asymptomatic    uncontroled HTN in pt with h/o HTN                  hold midodrin   Monitoring VS closely overnight   Consult cardiology if needed-d/w dr Welsh    Diabetes mellitus type 2 - stable                    continue metformin, monitoring BS     Hx of iron deff anemia d/t GIB stable -                   continue ferrous sulfate, Hgb stable    s/p Cataracts s/p phacoemulsification- right eye 12/27 by dr elizondo-stable                 - continue eye drops, pt is recovering well    Hyperlipidemia - contine home medications      PROPHYLAXIS: Early ambulation    CODE STATUS: Full code    DISPOSITION:  Pending the patient's response to treatment and medical condition. The patient's blood pressure will be monitored closely overnight. If it remains controlled, the patient may be discharged as early as tomorrow.     PCP: Dr. Caroline Montalvo    Charting performed by erinn Del Rosario for Dr. Greg Guillermo   , All medical record entries made by the shannanibe were at my direction. I have reviewed the chart and agree that the record accurately reflects my personal performance of the history, physical exam, hospital course, and assessment and plan. .             Electronically Signed On 12/29/2017 19:28  __________________________________________________   GREG MADRID

## 2020-09-29 NOTE — PROGRESS NOTES
Encounter opened in error. Report received, pt care assumed, tele box on. Pt aaox3, no signs of distress noted at this time. POC discussed with pt and verbalizes no questions. Pt assisted to bedside commode, then back to bed. Denies any other needs at this time. Bed in lowest position, bed alarm on, pt educated on fall risk and verbalized understanding, call light within reach, will continue to monitor.

## 2021-01-11 DIAGNOSIS — Z23 NEED FOR VACCINATION: ICD-10-CM

## 2022-10-28 NOTE — ED NOTES
Pt continuing to vomit, tremulous. Completely alert at this time. No oxygen requirements at this time.    good balance

## 2023-06-28 NOTE — CARE PLAN
Problem: Urinary Elimination:  Goal: Ability to reestablish a normal urinary elimination pattern will improve  Outcome: PROGRESSING AS EXPECTED      Problem: Pain Management  Goal: Pain level will decrease to patient's comfort goal  Outcome: PROGRESSING AS EXPECTED         Vacuum

## 2024-12-21 ENCOUNTER — HOSPITAL ENCOUNTER (OUTPATIENT)
Facility: MEDICAL CENTER | Age: 79
End: 2024-12-21
Payer: MEDICARE

## 2024-12-21 PROCEDURE — 87077 CULTURE AEROBIC IDENTIFY: CPT | Mod: 91

## 2024-12-21 PROCEDURE — 87186 SC STD MICRODIL/AGAR DIL: CPT

## 2024-12-21 PROCEDURE — 87086 URINE CULTURE/COLONY COUNT: CPT

## 2024-12-25 LAB
BACTERIA UR CULT: ABNORMAL
SIGNIFICANT IND 70042: ABNORMAL
SITE SITE: ABNORMAL
SOURCE SOURCE: ABNORMAL

## 2025-02-14 ENCOUNTER — HOSPITAL ENCOUNTER (OUTPATIENT)
Facility: MEDICAL CENTER | Age: 80
End: 2025-02-14
Payer: MEDICARE

## 2025-02-14 LAB — VALPROATE SERPL-MCNC: 30 UG/ML (ref 50–100)

## 2025-02-14 PROCEDURE — 80164 ASSAY DIPROPYLACETIC ACD TOT: CPT

## 2025-02-14 PROCEDURE — 87529 HSV DNA AMP PROBE: CPT | Mod: 91

## 2025-02-17 LAB
HSV1 DNA CSF QL NAA+PROBE: NOT DETECTED
HSV2 DNA CSF QL NAA+PROBE: NOT DETECTED
SPECIMEN SOURCE: NORMAL